# Patient Record
Sex: MALE | Race: WHITE | NOT HISPANIC OR LATINO | Employment: OTHER | URBAN - METROPOLITAN AREA
[De-identification: names, ages, dates, MRNs, and addresses within clinical notes are randomized per-mention and may not be internally consistent; named-entity substitution may affect disease eponyms.]

---

## 2018-01-11 ENCOUNTER — HOSPITAL ENCOUNTER (OUTPATIENT)
Dept: GENERAL RADIOLOGY | Facility: HOSPITAL | Age: 58
Discharge: HOME | End: 2018-01-11
Admitting: ORTHOPAEDIC SURGERY

## 2018-01-11 ENCOUNTER — APPOINTMENT (OUTPATIENT)
Dept: PREADMISSION TESTING | Facility: HOSPITAL | Age: 58
End: 2018-01-11

## 2018-01-11 ENCOUNTER — HOSPITAL ENCOUNTER (OUTPATIENT)
Dept: GENERAL RADIOLOGY | Facility: HOSPITAL | Age: 58
Discharge: HOME | End: 2018-01-11

## 2018-01-11 VITALS
TEMPERATURE: 98.6 F | WEIGHT: 208 LBS | RESPIRATION RATE: 16 BRPM | DIASTOLIC BLOOD PRESSURE: 82 MMHG | HEIGHT: 67 IN | OXYGEN SATURATION: 98 % | BODY MASS INDEX: 32.65 KG/M2 | SYSTOLIC BLOOD PRESSURE: 160 MMHG | HEART RATE: 67 BPM

## 2018-01-11 LAB
ALBUMIN SERPL-MCNC: 4.5 G/DL (ref 3.5–5.2)
ALBUMIN/GLOB SERPL: 1.3 G/DL
ALP SERPL-CCNC: 76 U/L (ref 39–117)
ALT SERPL W P-5'-P-CCNC: 38 U/L (ref 1–41)
ANION GAP SERPL CALCULATED.3IONS-SCNC: 14.6 MMOL/L
APTT PPP: 29.2 SECONDS (ref 22.7–35.4)
AST SERPL-CCNC: 28 U/L (ref 1–40)
BASOPHILS # BLD AUTO: 0.04 10*3/MM3 (ref 0–0.2)
BASOPHILS NFR BLD AUTO: 0.5 % (ref 0–1.5)
BILIRUB SERPL-MCNC: 0.5 MG/DL (ref 0.1–1.2)
BILIRUB UR QL STRIP: NEGATIVE
BUN BLD-MCNC: 21 MG/DL (ref 6–20)
BUN/CREAT SERPL: 20.4 (ref 7–25)
CALCIUM SPEC-SCNC: 9.5 MG/DL (ref 8.6–10.5)
CHLORIDE SERPL-SCNC: 99 MMOL/L (ref 98–107)
CLARITY UR: CLEAR
CO2 SERPL-SCNC: 24.4 MMOL/L (ref 22–29)
COLOR UR: YELLOW
CREAT BLD-MCNC: 1.03 MG/DL (ref 0.76–1.27)
DEPRECATED RDW RBC AUTO: 42.1 FL (ref 37–54)
EOSINOPHIL # BLD AUTO: 0.16 10*3/MM3 (ref 0–0.7)
EOSINOPHIL NFR BLD AUTO: 1.9 % (ref 0.3–6.2)
ERYTHROCYTE [DISTWIDTH] IN BLOOD BY AUTOMATED COUNT: 12.4 % (ref 11.5–14.5)
GFR SERPL CREATININE-BSD FRML MDRD: 74 ML/MIN/1.73
GLOBULIN UR ELPH-MCNC: 3.4 GM/DL
GLUCOSE BLD-MCNC: 95 MG/DL (ref 65–99)
GLUCOSE UR STRIP-MCNC: NEGATIVE MG/DL
HCT VFR BLD AUTO: 47.2 % (ref 40.4–52.2)
HGB BLD-MCNC: 16.2 G/DL (ref 13.7–17.6)
HGB UR QL STRIP.AUTO: NEGATIVE
IMM GRANULOCYTES # BLD: 0.03 10*3/MM3 (ref 0–0.03)
IMM GRANULOCYTES NFR BLD: 0.4 % (ref 0–0.5)
INR PPP: 1.04 (ref 0.9–1.1)
KETONES UR QL STRIP: NEGATIVE
LEUKOCYTE ESTERASE UR QL STRIP.AUTO: NEGATIVE
LYMPHOCYTES # BLD AUTO: 2.47 10*3/MM3 (ref 0.9–4.8)
LYMPHOCYTES NFR BLD AUTO: 29.4 % (ref 19.6–45.3)
MCH RBC QN AUTO: 32.2 PG (ref 27–32.7)
MCHC RBC AUTO-ENTMCNC: 34.3 G/DL (ref 32.6–36.4)
MCV RBC AUTO: 93.8 FL (ref 79.8–96.2)
MONOCYTES # BLD AUTO: 0.64 10*3/MM3 (ref 0.2–1.2)
MONOCYTES NFR BLD AUTO: 7.6 % (ref 5–12)
NEUTROPHILS # BLD AUTO: 5.05 10*3/MM3 (ref 1.9–8.1)
NEUTROPHILS NFR BLD AUTO: 60.2 % (ref 42.7–76)
NITRITE UR QL STRIP: NEGATIVE
PH UR STRIP.AUTO: 6 [PH] (ref 5–8)
PLATELET # BLD AUTO: 205 10*3/MM3 (ref 140–500)
PMV BLD AUTO: 9.3 FL (ref 6–12)
POTASSIUM BLD-SCNC: 4 MMOL/L (ref 3.5–5.2)
PROT SERPL-MCNC: 7.9 G/DL (ref 6–8.5)
PROT UR QL STRIP: NEGATIVE
PROTHROMBIN TIME: 13.2 SECONDS (ref 11.7–14.2)
RBC # BLD AUTO: 5.03 10*6/MM3 (ref 4.6–6)
SODIUM BLD-SCNC: 138 MMOL/L (ref 136–145)
SP GR UR STRIP: 1.02 (ref 1–1.03)
UROBILINOGEN UR QL STRIP: NORMAL
WBC NRBC COR # BLD: 8.39 10*3/MM3 (ref 4.5–10.7)

## 2018-01-11 PROCEDURE — 73560 X-RAY EXAM OF KNEE 1 OR 2: CPT

## 2018-01-11 PROCEDURE — 80053 COMPREHEN METABOLIC PANEL: CPT | Performed by: ORTHOPAEDIC SURGERY

## 2018-01-11 PROCEDURE — 85610 PROTHROMBIN TIME: CPT | Performed by: ORTHOPAEDIC SURGERY

## 2018-01-11 PROCEDURE — 85730 THROMBOPLASTIN TIME PARTIAL: CPT | Performed by: ORTHOPAEDIC SURGERY

## 2018-01-11 PROCEDURE — 71046 X-RAY EXAM CHEST 2 VIEWS: CPT

## 2018-01-11 PROCEDURE — 93010 ELECTROCARDIOGRAM REPORT: CPT | Performed by: INTERNAL MEDICINE

## 2018-01-11 PROCEDURE — 85025 COMPLETE CBC W/AUTO DIFF WBC: CPT | Performed by: ORTHOPAEDIC SURGERY

## 2018-01-11 PROCEDURE — 36415 COLL VENOUS BLD VENIPUNCTURE: CPT

## 2018-01-11 PROCEDURE — 81003 URINALYSIS AUTO W/O SCOPE: CPT | Performed by: ORTHOPAEDIC SURGERY

## 2018-01-11 PROCEDURE — 93005 ELECTROCARDIOGRAM TRACING: CPT

## 2018-01-11 RX ORDER — VALSARTAN 40 MG/1
40 TABLET ORAL EVERY MORNING
Status: ON HOLD | COMMUNITY
End: 2021-12-23

## 2018-01-11 RX ORDER — ASPIRIN 81 MG/1
81 TABLET, CHEWABLE ORAL DAILY
COMMUNITY
End: 2018-01-19 | Stop reason: HOSPADM

## 2018-01-11 RX ORDER — LEVOCETIRIZINE DIHYDROCHLORIDE 5 MG/1
5 TABLET, FILM COATED ORAL EVERY EVENING
Status: ON HOLD | COMMUNITY
End: 2021-12-23

## 2018-01-11 ASSESSMENT — KOOS JR
KOOS JR SCORE: 47.487
KOOS JR SCORE: 16

## 2018-01-11 NOTE — DISCHARGE INSTRUCTIONS
Take the following medications the morning of surgery with a small sip of water:    VALSARTAN  METOPROLOL/HCTZ  NEXIUM    General Instructions:  • Do not eat solid food after midnight the night before surgery.  • You may drink clear liquids day of surgery but must stop at least one hour before your hospital arrival time @ 0430 AM STOP DRINKING!!!  • It is beneficial for you to have a clear drink that contains carbohydrates the day of surgery.  We suggest a 12 to 20 ounce bottle of Gatorade or Powerade for non-diabetic patients or a 12 to 20 ounce bottle of G2 or Powerade Zero for diabetic patients.     Clear liquids are liquids you can see through.  Nothing red in color.     Plain water                               Sports drinks  Sodas                                   Gelatin (Jell-O)  Fruit juices without pulp such as white grape juice and apple juice  Popsicles that contain no fruit or yogurt  Tea or coffee (no cream or milk added)  Gatorade / Powerade  G2 / Powerade Zero    • Patients who avoid smoking, chewing tobacco and alcohol for 4 weeks prior to surgery have a reduced risk of post-operative complications.  Quit smoking as many days before surgery as you can.  • Do not smoke, use chewing tobacco or drink alcohol the day of surgery.   • If applicable bring your C-PAP/ BI-PAP machine.  • Bring any papers given to you in the doctor’s office.  • Wear clean comfortable clothes and socks.  • Do not wear contact lenses or make-up.  Bring a case for your glasses.   • Bring crutches or walker if applicable.  • Remove all piercings.  Leave jewelry and any other valuables at home.  • Hair extensions with metal clips must be removed prior to surgery.  • The Pre-Admission Testing nurse will instruct you to bring medications if unable to obtain an accurate list in Pre-Admission Testing.        Preventing a Surgical Site Infection:  • For 2 to 3 days before surgery, avoid shaving with a razor because the razor can  irritate skin and make it easier to develop an infection.  • The night prior to surgery sleep in a clean bed with clean clothing.  Do not allow pets to sleep with you.  • Shower on the morning of surgery using a fresh bar of anti-bacterial soap (such as Dial) and clean washcloth.  Dry with a clean towel and dress in clean clothing.  • Ask your surgeon if you will be receiving antibiotics prior to surgery.  • Make sure you, your family, and all healthcare providers clean their hands with soap and water or an alcohol based hand  before caring for you or your wound.    Day of surgery: 01- ARRIVE @ 0530 AM REPORT TO THE MAIN OR   Upon arrival, a Pre-op nurse and Anesthesiologist will review your health history, obtain vital signs, and answer questions you may have.  The only belongings needed at this time will be your home medications and if applicable your C-PAP/BI-PAP machine.  If you are staying overnight your family can leave the rest of your belongings in the car and bring them to your room later.  A Pre-op nurse will start an IV and you may receive medication in preparation for surgery, including something to help you relax.  Your family will be able to see you in the Pre-op area.  While you are in surgery your family should notify the waiting room  if they leave the waiting room area and provide a contact phone number.    Please be aware that surgery does come with discomfort.  We want to make every effort to control your discomfort so please discuss any uncontrolled symptoms with your nurse.   Your doctor will most likely have prescribed pain medications.      If you are going home after surgery you will receive individualized written care instructions before being discharged.  A responsible adult must drive you to and from the hospital on the day of your surgery and stay with you for 24 hours.    If you are staying overnight following surgery, you will be transported to your hospital  room following the recovery period.  [facility] has all private rooms.    If you have any questions please call Pre-Admission Testing at [phone].  Deductibles and co-payments are collected on the day of service. Please be prepared to pay the required co-pay, deductible or deposit on the day of service as defined by your plan.    2% CHLORAHEXIDINE GLUCONATE* CLOTH  Preparing or “prepping” skin before surgery can reduce the risk of infection at the surgical site. To make the process easier, [facility] has chosen disposable cloths moistened with a rinse-free, 2% Chlorhexidine Gluconate (CHG) antiseptic solution. The steps below outline the prepping process and should be carefully followed.        Use the prep cloth on the area that is circled in the diagram             Directions Night before Surgery 01- PM PRIOR TO SURGERY (LEFT LEG)  1) Shower using a fresh bar of anti-bacterial soap (such as Dial) and clean washcloth.  Use a clean towel to completely dry your skin.  2) Do not use any lotions, oils or creams on your skin.  3) Open the package and remove 1 cloth, wipe your skin for 30 seconds in a circular motion.  Allow to dry for 3 minutes.  4) Repeat #3 with second cloth.  5) Do not touch your eyes, ears, or mouth with the prep cloth.  6) Allow the wet prep solution to air dry.  7) Discard the prep cloth and wash your hands with soap and water.   8) Dress in clean bed clothes and sleep on fresh clean bed sheets.   9) You may experience some temporary itching after the prep.    Directions Day of Surgery 01- AM PRIOR TO SURGERY (LEFT LEG)  1) Repeat steps 1,2,3,4,5,6,7, and 9.   2) Dress in clean clothes before coming to the hospital.    BACTROBAN NASAL OINTMENT  There are many germs normally in your nose. Bactroban is an ointment that will help reduce these germs. Please follow these instructions for Bactroban use:    ____The day before surgery in the  morning  Jzec__[phone]______    ____The day before surgery in the evening              Kjva__[phone]______    ____The day of surgery in the morning    Anpk__[phone]______    **Squirt ½ package of Bactroban Ointment onto a cotton applicator and apply to inside of 1st nostril.  Squirt the remaining Bactroban and apply to the inside of the other nostril.

## 2018-01-18 ENCOUNTER — HOSPITAL ENCOUNTER (INPATIENT)
Facility: HOSPITAL | Age: 58
LOS: 1 days | Discharge: HOME HEALTH | End: 2018-01-19
Attending: ORTHOPAEDIC SURGERY | Admitting: ORTHOPAEDIC SURGERY

## 2018-01-18 ENCOUNTER — APPOINTMENT (OUTPATIENT)
Dept: GENERAL RADIOLOGY | Facility: HOSPITAL | Age: 58
End: 2018-01-18

## 2018-01-18 ENCOUNTER — ANESTHESIA EVENT (OUTPATIENT)
Dept: PERIOP | Facility: HOSPITAL | Age: 58
End: 2018-01-18

## 2018-01-18 ENCOUNTER — ANESTHESIA (OUTPATIENT)
Dept: PERIOP | Facility: HOSPITAL | Age: 58
End: 2018-01-18

## 2018-01-18 DIAGNOSIS — Z96.652 STATUS POST TOTAL LEFT KNEE REPLACEMENT: Primary | ICD-10-CM

## 2018-01-18 PROBLEM — M17.9 DJD (DEGENERATIVE JOINT DISEASE) OF KNEE: Status: ACTIVE | Noted: 2018-01-18

## 2018-01-18 PROCEDURE — 73560 X-RAY EXAM OF KNEE 1 OR 2: CPT

## 2018-01-18 PROCEDURE — 25010000003 CEFAZOLIN IN DEXTROSE 2-4 GM/100ML-% SOLUTION: Performed by: NURSE ANESTHETIST, CERTIFIED REGISTERED

## 2018-01-18 PROCEDURE — C1713 ANCHOR/SCREW BN/BN,TIS/BN: HCPCS | Performed by: ORTHOPAEDIC SURGERY

## 2018-01-18 PROCEDURE — 25010000002 KETOROLAC TROMETHAMINE PER 15 MG: Performed by: ORTHOPAEDIC SURGERY

## 2018-01-18 PROCEDURE — 97110 THERAPEUTIC EXERCISES: CPT

## 2018-01-18 PROCEDURE — 0SRD0J9 REPLACEMENT OF LEFT KNEE JOINT WITH SYNTHETIC SUBSTITUTE, CEMENTED, OPEN APPROACH: ICD-10-PCS | Performed by: ORTHOPAEDIC SURGERY

## 2018-01-18 PROCEDURE — 25010000002 DEXAMETHASONE PER 1 MG: Performed by: NURSE ANESTHETIST, CERTIFIED REGISTERED

## 2018-01-18 PROCEDURE — 25010000002 MIDAZOLAM PER 1 MG: Performed by: ANESTHESIOLOGY

## 2018-01-18 PROCEDURE — C1776 JOINT DEVICE (IMPLANTABLE): HCPCS | Performed by: ORTHOPAEDIC SURGERY

## 2018-01-18 PROCEDURE — 25010000002 NEOSTIGMINE PER 0.5 MG: Performed by: NURSE ANESTHETIST, CERTIFIED REGISTERED

## 2018-01-18 PROCEDURE — 25010000002 ONDANSETRON PER 1 MG: Performed by: NURSE ANESTHETIST, CERTIFIED REGISTERED

## 2018-01-18 PROCEDURE — 25010000002 FENTANYL CITRATE (PF) 100 MCG/2ML SOLUTION: Performed by: ANESTHESIOLOGY

## 2018-01-18 PROCEDURE — 97162 PT EVAL MOD COMPLEX 30 MIN: CPT

## 2018-01-18 PROCEDURE — 25010000002 SUCCINYLCHOLINE PER 20 MG: Performed by: NURSE ANESTHETIST, CERTIFIED REGISTERED

## 2018-01-18 PROCEDURE — 25010000003 CEFAZOLIN IN DEXTROSE 2-4 GM/100ML-% SOLUTION: Performed by: ORTHOPAEDIC SURGERY

## 2018-01-18 PROCEDURE — 25010000002 HYDROMORPHONE PER 4 MG: Performed by: NURSE ANESTHETIST, CERTIFIED REGISTERED

## 2018-01-18 PROCEDURE — 25010000002 PROPOFOL 10 MG/ML EMULSION: Performed by: NURSE ANESTHETIST, CERTIFIED REGISTERED

## 2018-01-18 PROCEDURE — 25010000002 VANCOMYCIN 10 G RECONSTITUTED SOLUTION: Performed by: ORTHOPAEDIC SURGERY

## 2018-01-18 PROCEDURE — 25010000002 FENTANYL CITRATE (PF) 100 MCG/2ML SOLUTION: Performed by: NURSE ANESTHETIST, CERTIFIED REGISTERED

## 2018-01-18 PROCEDURE — 25010000002 FENTANYL CITRATE (PF) 250 MCG/5ML SOLUTION

## 2018-01-18 DEVICE — CMT BONE PALACOS 120001: Type: IMPLANTABLE DEVICE | Site: KNEE | Status: FUNCTIONAL

## 2018-01-18 DEVICE — JOURNEY II CR FEMORAL OXINIUM NONPOROUS LEFT SIZE 5
Type: IMPLANTABLE DEVICE | Site: KNEE | Status: FUNCTIONAL
Brand: JOURNEY

## 2018-01-18 DEVICE — IMPLANTABLE DEVICE: Type: IMPLANTABLE DEVICE | Site: KNEE | Status: FUNCTIONAL

## 2018-01-18 DEVICE — JOURNEY TIBIAL BASEPLATE NONPOROUS                                    LEFT SIZE 5
Type: IMPLANTABLE DEVICE | Site: KNEE | Status: FUNCTIONAL
Brand: JOURNEY

## 2018-01-18 DEVICE — JOURNEY 7.5 ROUND RESURF PAT 35MM STANDARD
Type: IMPLANTABLE DEVICE | Site: KNEE | Status: FUNCTIONAL
Brand: JOURNEY

## 2018-01-18 RX ORDER — ACETAMINOPHEN 325 MG/1
325 TABLET ORAL EVERY 4 HOURS PRN
Status: DISCONTINUED | OUTPATIENT
Start: 2018-01-18 | End: 2018-01-19 | Stop reason: HOSPADM

## 2018-01-18 RX ORDER — MIDAZOLAM HYDROCHLORIDE 1 MG/ML
2 INJECTION INTRAMUSCULAR; INTRAVENOUS
Status: DISCONTINUED | OUTPATIENT
Start: 2018-01-18 | End: 2018-01-18 | Stop reason: HOSPADM

## 2018-01-18 RX ORDER — SODIUM CHLORIDE, SODIUM LACTATE, POTASSIUM CHLORIDE, CALCIUM CHLORIDE 600; 310; 30; 20 MG/100ML; MG/100ML; MG/100ML; MG/100ML
9 INJECTION, SOLUTION INTRAVENOUS CONTINUOUS
Status: DISCONTINUED | OUTPATIENT
Start: 2018-01-18 | End: 2018-01-19 | Stop reason: HOSPADM

## 2018-01-18 RX ORDER — FLUMAZENIL 0.1 MG/ML
0.2 INJECTION INTRAVENOUS AS NEEDED
Status: DISCONTINUED | OUTPATIENT
Start: 2018-01-18 | End: 2018-01-18 | Stop reason: HOSPADM

## 2018-01-18 RX ORDER — HYDRALAZINE HYDROCHLORIDE 20 MG/ML
5 INJECTION INTRAMUSCULAR; INTRAVENOUS
Status: DISCONTINUED | OUTPATIENT
Start: 2018-01-18 | End: 2018-01-18 | Stop reason: HOSPADM

## 2018-01-18 RX ORDER — SODIUM CHLORIDE, SODIUM LACTATE, POTASSIUM CHLORIDE, CALCIUM CHLORIDE 600; 310; 30; 20 MG/100ML; MG/100ML; MG/100ML; MG/100ML
100 INJECTION, SOLUTION INTRAVENOUS CONTINUOUS
Status: DISCONTINUED | OUTPATIENT
Start: 2018-01-18 | End: 2018-01-19 | Stop reason: HOSPADM

## 2018-01-18 RX ORDER — KETOROLAC TROMETHAMINE 30 MG/ML
30 INJECTION, SOLUTION INTRAMUSCULAR; INTRAVENOUS EVERY 6 HOURS
Status: COMPLETED | OUTPATIENT
Start: 2018-01-18 | End: 2018-01-19

## 2018-01-18 RX ORDER — NALOXONE HCL 0.4 MG/ML
0.2 VIAL (ML) INJECTION AS NEEDED
Status: DISCONTINUED | OUTPATIENT
Start: 2018-01-18 | End: 2018-01-18 | Stop reason: HOSPADM

## 2018-01-18 RX ORDER — FAMOTIDINE 10 MG/ML
20 INJECTION, SOLUTION INTRAVENOUS ONCE
Status: COMPLETED | OUTPATIENT
Start: 2018-01-18 | End: 2018-01-18

## 2018-01-18 RX ORDER — DIPHENHYDRAMINE HYDROCHLORIDE 50 MG/ML
12.5 INJECTION INTRAMUSCULAR; INTRAVENOUS
Status: DISCONTINUED | OUTPATIENT
Start: 2018-01-18 | End: 2018-01-18 | Stop reason: HOSPADM

## 2018-01-18 RX ORDER — OXYCODONE AND ACETAMINOPHEN 7.5; 325 MG/1; MG/1
1 TABLET ORAL ONCE AS NEEDED
Status: DISCONTINUED | OUTPATIENT
Start: 2018-01-18 | End: 2018-01-18 | Stop reason: HOSPADM

## 2018-01-18 RX ORDER — ACETAMINOPHEN 500 MG
1000 TABLET ORAL ONCE
Status: COMPLETED | OUTPATIENT
Start: 2018-01-18 | End: 2018-01-18

## 2018-01-18 RX ORDER — GLYCOPYRROLATE 0.2 MG/ML
INJECTION INTRAMUSCULAR; INTRAVENOUS AS NEEDED
Status: DISCONTINUED | OUTPATIENT
Start: 2018-01-18 | End: 2018-01-18 | Stop reason: SURG

## 2018-01-18 RX ORDER — HYDROMORPHONE HCL 110MG/55ML
1 PATIENT CONTROLLED ANALGESIA SYRINGE INTRAVENOUS EVERY 4 HOURS PRN
Status: DISCONTINUED | OUTPATIENT
Start: 2018-01-18 | End: 2018-01-19 | Stop reason: HOSPADM

## 2018-01-18 RX ORDER — ASPIRIN 325 MG
325 TABLET, DELAYED RELEASE (ENTERIC COATED) ORAL DAILY
Status: DISCONTINUED | OUTPATIENT
Start: 2018-01-18 | End: 2018-01-19 | Stop reason: HOSPADM

## 2018-01-18 RX ORDER — CEFAZOLIN SODIUM 2 G/100ML
2 INJECTION, SOLUTION INTRAVENOUS EVERY 8 HOURS
Status: COMPLETED | OUTPATIENT
Start: 2018-01-18 | End: 2018-01-19

## 2018-01-18 RX ORDER — LIDOCAINE HYDROCHLORIDE 20 MG/ML
INJECTION, SOLUTION INFILTRATION; PERINEURAL AS NEEDED
Status: DISCONTINUED | OUTPATIENT
Start: 2018-01-18 | End: 2018-01-18 | Stop reason: SURG

## 2018-01-18 RX ORDER — CEFAZOLIN SODIUM 2 G/100ML
INJECTION, SOLUTION INTRAVENOUS AS NEEDED
Status: DISCONTINUED | OUTPATIENT
Start: 2018-01-18 | End: 2018-01-18 | Stop reason: SURG

## 2018-01-18 RX ORDER — FENTANYL CITRATE 50 UG/ML
INJECTION, SOLUTION INTRAMUSCULAR; INTRAVENOUS
Status: COMPLETED
Start: 2018-01-18 | End: 2018-01-18

## 2018-01-18 RX ORDER — ONDANSETRON 2 MG/ML
4 INJECTION INTRAMUSCULAR; INTRAVENOUS ONCE AS NEEDED
Status: DISCONTINUED | OUTPATIENT
Start: 2018-01-18 | End: 2018-01-18 | Stop reason: HOSPADM

## 2018-01-18 RX ORDER — ONDANSETRON 2 MG/ML
INJECTION INTRAMUSCULAR; INTRAVENOUS AS NEEDED
Status: DISCONTINUED | OUTPATIENT
Start: 2018-01-18 | End: 2018-01-18 | Stop reason: SURG

## 2018-01-18 RX ORDER — ACETAMINOPHEN 650 MG/1
650 SUPPOSITORY RECTAL EVERY 4 HOURS PRN
Status: DISCONTINUED | OUTPATIENT
Start: 2018-01-18 | End: 2018-01-19 | Stop reason: HOSPADM

## 2018-01-18 RX ORDER — OXYCODONE HYDROCHLORIDE AND ACETAMINOPHEN 5; 325 MG/1; MG/1
2 TABLET ORAL EVERY 4 HOURS PRN
Status: DISCONTINUED | OUTPATIENT
Start: 2018-01-18 | End: 2018-01-19 | Stop reason: HOSPADM

## 2018-01-18 RX ORDER — MIDAZOLAM HYDROCHLORIDE 1 MG/ML
1 INJECTION INTRAMUSCULAR; INTRAVENOUS
Status: DISCONTINUED | OUTPATIENT
Start: 2018-01-18 | End: 2018-01-18 | Stop reason: HOSPADM

## 2018-01-18 RX ORDER — HYDROMORPHONE HCL 110MG/55ML
PATIENT CONTROLLED ANALGESIA SYRINGE INTRAVENOUS AS NEEDED
Status: DISCONTINUED | OUTPATIENT
Start: 2018-01-18 | End: 2018-01-18 | Stop reason: SURG

## 2018-01-18 RX ORDER — PROPOFOL 10 MG/ML
VIAL (ML) INTRAVENOUS AS NEEDED
Status: DISCONTINUED | OUTPATIENT
Start: 2018-01-18 | End: 2018-01-18 | Stop reason: SURG

## 2018-01-18 RX ORDER — LISINOPRIL 20 MG/1
20 TABLET ORAL
Status: DISCONTINUED | OUTPATIENT
Start: 2018-01-18 | End: 2018-01-19 | Stop reason: HOSPADM

## 2018-01-18 RX ORDER — NALOXONE HCL 0.4 MG/ML
0.1 VIAL (ML) INJECTION
Status: DISCONTINUED | OUTPATIENT
Start: 2018-01-18 | End: 2018-01-19 | Stop reason: HOSPADM

## 2018-01-18 RX ORDER — BISACODYL 10 MG
10 SUPPOSITORY, RECTAL RECTAL DAILY PRN
Status: DISCONTINUED | OUTPATIENT
Start: 2018-01-18 | End: 2018-01-19 | Stop reason: HOSPADM

## 2018-01-18 RX ORDER — PROMETHAZINE HYDROCHLORIDE 25 MG/1
12.5 TABLET ORAL ONCE AS NEEDED
Status: DISCONTINUED | OUTPATIENT
Start: 2018-01-18 | End: 2018-01-18 | Stop reason: HOSPADM

## 2018-01-18 RX ORDER — SODIUM CHLORIDE 0.9 % (FLUSH) 0.9 %
1-10 SYRINGE (ML) INJECTION AS NEEDED
Status: DISCONTINUED | OUTPATIENT
Start: 2018-01-18 | End: 2018-01-19 | Stop reason: HOSPADM

## 2018-01-18 RX ORDER — FERROUS SULFATE 325(65) MG
325 TABLET ORAL
Status: DISCONTINUED | OUTPATIENT
Start: 2018-01-19 | End: 2018-01-19 | Stop reason: HOSPADM

## 2018-01-18 RX ORDER — SUCCINYLCHOLINE CHLORIDE 20 MG/ML
INJECTION INTRAMUSCULAR; INTRAVENOUS AS NEEDED
Status: DISCONTINUED | OUTPATIENT
Start: 2018-01-18 | End: 2018-01-18 | Stop reason: SURG

## 2018-01-18 RX ORDER — FENTANYL CITRATE 50 UG/ML
50 INJECTION, SOLUTION INTRAMUSCULAR; INTRAVENOUS
Status: DISCONTINUED | OUTPATIENT
Start: 2018-01-18 | End: 2018-01-18 | Stop reason: HOSPADM

## 2018-01-18 RX ORDER — AMLODIPINE BESYLATE AND BENAZEPRIL HYDROCHLORIDE 10; 20 MG/1; MG/1
1 CAPSULE ORAL
Status: DISCONTINUED | OUTPATIENT
Start: 2018-01-18 | End: 2018-01-18 | Stop reason: CLARIF

## 2018-01-18 RX ORDER — ONDANSETRON 4 MG/1
4 TABLET, FILM COATED ORAL EVERY 6 HOURS PRN
Status: DISCONTINUED | OUTPATIENT
Start: 2018-01-18 | End: 2018-01-19 | Stop reason: HOSPADM

## 2018-01-18 RX ORDER — TRANEXAMIC ACID 100 MG/ML
INJECTION, SOLUTION INTRAVENOUS AS NEEDED
Status: DISCONTINUED | OUTPATIENT
Start: 2018-01-18 | End: 2018-01-18 | Stop reason: SURG

## 2018-01-18 RX ORDER — MAGNESIUM HYDROXIDE 1200 MG/15ML
LIQUID ORAL AS NEEDED
Status: DISCONTINUED | OUTPATIENT
Start: 2018-01-18 | End: 2018-01-18 | Stop reason: HOSPADM

## 2018-01-18 RX ORDER — EPHEDRINE SULFATE 50 MG/ML
5 INJECTION, SOLUTION INTRAVENOUS ONCE AS NEEDED
Status: DISCONTINUED | OUTPATIENT
Start: 2018-01-18 | End: 2018-01-18 | Stop reason: HOSPADM

## 2018-01-18 RX ORDER — PROMETHAZINE HYDROCHLORIDE 25 MG/1
25 SUPPOSITORY RECTAL ONCE AS NEEDED
Status: DISCONTINUED | OUTPATIENT
Start: 2018-01-18 | End: 2018-01-18 | Stop reason: HOSPADM

## 2018-01-18 RX ORDER — OXYCODONE HYDROCHLORIDE AND ACETAMINOPHEN 5; 325 MG/1; MG/1
1 TABLET ORAL EVERY 4 HOURS PRN
Status: DISCONTINUED | OUTPATIENT
Start: 2018-01-18 | End: 2018-01-19 | Stop reason: HOSPADM

## 2018-01-18 RX ORDER — DEXAMETHASONE SODIUM PHOSPHATE 10 MG/ML
INJECTION INTRAMUSCULAR; INTRAVENOUS AS NEEDED
Status: DISCONTINUED | OUTPATIENT
Start: 2018-01-18 | End: 2018-01-18 | Stop reason: SURG

## 2018-01-18 RX ORDER — LABETALOL HYDROCHLORIDE 5 MG/ML
5 INJECTION, SOLUTION INTRAVENOUS
Status: DISCONTINUED | OUTPATIENT
Start: 2018-01-18 | End: 2018-01-18 | Stop reason: HOSPADM

## 2018-01-18 RX ORDER — PROMETHAZINE HYDROCHLORIDE 25 MG/ML
12.5 INJECTION, SOLUTION INTRAMUSCULAR; INTRAVENOUS ONCE AS NEEDED
Status: DISCONTINUED | OUTPATIENT
Start: 2018-01-18 | End: 2018-01-18 | Stop reason: HOSPADM

## 2018-01-18 RX ORDER — LIDOCAINE HYDROCHLORIDE 10 MG/ML
0.5 INJECTION, SOLUTION EPIDURAL; INFILTRATION; INTRACAUDAL; PERINEURAL ONCE AS NEEDED
Status: DISCONTINUED | OUTPATIENT
Start: 2018-01-18 | End: 2018-01-18 | Stop reason: HOSPADM

## 2018-01-18 RX ORDER — AMLODIPINE BESYLATE 10 MG/1
10 TABLET ORAL
Status: DISCONTINUED | OUTPATIENT
Start: 2018-01-18 | End: 2018-01-19 | Stop reason: HOSPADM

## 2018-01-18 RX ORDER — ONDANSETRON 2 MG/ML
4 INJECTION INTRAMUSCULAR; INTRAVENOUS EVERY 6 HOURS PRN
Status: DISCONTINUED | OUTPATIENT
Start: 2018-01-18 | End: 2018-01-19 | Stop reason: HOSPADM

## 2018-01-18 RX ORDER — ACETAMINOPHEN 160 MG/5ML
650 SOLUTION ORAL EVERY 4 HOURS PRN
Status: DISCONTINUED | OUTPATIENT
Start: 2018-01-18 | End: 2018-01-19 | Stop reason: HOSPADM

## 2018-01-18 RX ORDER — BISACODYL 5 MG/1
10 TABLET, DELAYED RELEASE ORAL DAILY PRN
Status: DISCONTINUED | OUTPATIENT
Start: 2018-01-18 | End: 2018-01-19 | Stop reason: HOSPADM

## 2018-01-18 RX ORDER — HYDROMORPHONE HCL 110MG/55ML
0.5 PATIENT CONTROLLED ANALGESIA SYRINGE INTRAVENOUS
Status: DISCONTINUED | OUTPATIENT
Start: 2018-01-18 | End: 2018-01-18 | Stop reason: HOSPADM

## 2018-01-18 RX ORDER — EPHEDRINE SULFATE 50 MG/ML
INJECTION, SOLUTION INTRAVENOUS AS NEEDED
Status: DISCONTINUED | OUTPATIENT
Start: 2018-01-18 | End: 2018-01-18 | Stop reason: SURG

## 2018-01-18 RX ORDER — SODIUM CHLORIDE 0.9 % (FLUSH) 0.9 %
1-10 SYRINGE (ML) INJECTION AS NEEDED
Status: DISCONTINUED | OUTPATIENT
Start: 2018-01-18 | End: 2018-01-18 | Stop reason: HOSPADM

## 2018-01-18 RX ORDER — HYDROCODONE BITARTRATE AND ACETAMINOPHEN 7.5; 325 MG/1; MG/1
1 TABLET ORAL ONCE AS NEEDED
Status: DISCONTINUED | OUTPATIENT
Start: 2018-01-18 | End: 2018-01-18 | Stop reason: HOSPADM

## 2018-01-18 RX ORDER — ROCURONIUM BROMIDE 10 MG/ML
INJECTION, SOLUTION INTRAVENOUS AS NEEDED
Status: DISCONTINUED | OUTPATIENT
Start: 2018-01-18 | End: 2018-01-18 | Stop reason: SURG

## 2018-01-18 RX ORDER — PROMETHAZINE HYDROCHLORIDE 25 MG/1
25 TABLET ORAL ONCE AS NEEDED
Status: DISCONTINUED | OUTPATIENT
Start: 2018-01-18 | End: 2018-01-18 | Stop reason: HOSPADM

## 2018-01-18 RX ORDER — ONDANSETRON 4 MG/1
4 TABLET, ORALLY DISINTEGRATING ORAL EVERY 6 HOURS PRN
Status: DISCONTINUED | OUTPATIENT
Start: 2018-01-18 | End: 2018-01-19 | Stop reason: HOSPADM

## 2018-01-18 RX ORDER — ACETAMINOPHEN 325 MG/1
650 TABLET ORAL EVERY 4 HOURS PRN
Status: DISCONTINUED | OUTPATIENT
Start: 2018-01-18 | End: 2018-01-19 | Stop reason: HOSPADM

## 2018-01-18 RX ADMIN — FENTANYL CITRATE 50 MCG: 50 INJECTION, SOLUTION INTRAMUSCULAR; INTRAVENOUS at 13:15

## 2018-01-18 RX ADMIN — SODIUM CHLORIDE, POTASSIUM CHLORIDE, SODIUM LACTATE AND CALCIUM CHLORIDE: 600; 310; 30; 20 INJECTION, SOLUTION INTRAVENOUS at 11:22

## 2018-01-18 RX ADMIN — PROPOFOL 200 MG: 10 INJECTION, EMULSION INTRAVENOUS at 10:29

## 2018-01-18 RX ADMIN — FENTANYL CITRATE 100 MCG: 50 INJECTION INTRAMUSCULAR; INTRAVENOUS at 10:50

## 2018-01-18 RX ADMIN — SUCCINYLCHOLINE CHLORIDE 160 MG: 20 INJECTION, SOLUTION INTRAMUSCULAR; INTRAVENOUS; PARENTERAL at 10:29

## 2018-01-18 RX ADMIN — NEOSTIGMINE METHYLSULFATE 2 MG: 1 INJECTION INTRAMUSCULAR; INTRAVENOUS; SUBCUTANEOUS at 11:49

## 2018-01-18 RX ADMIN — FAMOTIDINE 20 MG: 10 INJECTION, SOLUTION INTRAVENOUS at 07:53

## 2018-01-18 RX ADMIN — VANCOMYCIN HYDROCHLORIDE 1500 MG: 100 INJECTION, POWDER, LYOPHILIZED, FOR SOLUTION INTRAVENOUS at 07:44

## 2018-01-18 RX ADMIN — SODIUM CHLORIDE, POTASSIUM CHLORIDE, SODIUM LACTATE AND CALCIUM CHLORIDE 9 ML/HR: 600; 310; 30; 20 INJECTION, SOLUTION INTRAVENOUS at 07:44

## 2018-01-18 RX ADMIN — CEFAZOLIN SODIUM 2 G: 2 INJECTION, SOLUTION INTRAVENOUS at 22:11

## 2018-01-18 RX ADMIN — LIDOCAINE HYDROCHLORIDE 50 MG: 20 INJECTION, SOLUTION INFILTRATION; PERINEURAL at 10:29

## 2018-01-18 RX ADMIN — ROCURONIUM BROMIDE 30 MG: 10 INJECTION INTRAVENOUS at 10:43

## 2018-01-18 RX ADMIN — ACETAMINOPHEN 650 MG: 325 TABLET ORAL at 23:58

## 2018-01-18 RX ADMIN — ASPIRIN 325 MG: 325 TABLET, COATED ORAL at 16:03

## 2018-01-18 RX ADMIN — CEFAZOLIN SODIUM 2 G: 2 INJECTION, SOLUTION INTRAVENOUS at 11:48

## 2018-01-18 RX ADMIN — FENTANYL CITRATE 100 MCG: 50 INJECTION INTRAMUSCULAR; INTRAVENOUS at 10:29

## 2018-01-18 RX ADMIN — ACETAMINOPHEN 1000 MG: 500 TABLET ORAL at 07:35

## 2018-01-18 RX ADMIN — PROPOFOL 50 MG: 10 INJECTION, EMULSION INTRAVENOUS at 10:43

## 2018-01-18 RX ADMIN — ONDANSETRON HYDROCHLORIDE 4 MG: 4 TABLET, FILM COATED ORAL at 17:13

## 2018-01-18 RX ADMIN — KETOROLAC TROMETHAMINE 30 MG: 30 INJECTION, SOLUTION INTRAMUSCULAR at 22:33

## 2018-01-18 RX ADMIN — FENTANYL CITRATE 50 MCG: 50 INJECTION INTRAMUSCULAR; INTRAVENOUS at 10:43

## 2018-01-18 RX ADMIN — HYDROMORPHONE HYDROCHLORIDE 0.5 MG: 2 INJECTION INTRAMUSCULAR; INTRAVENOUS; SUBCUTANEOUS at 10:51

## 2018-01-18 RX ADMIN — ROCURONIUM BROMIDE 5 MG: 10 INJECTION INTRAVENOUS at 10:29

## 2018-01-18 RX ADMIN — GLYCOPYRROLATE 0.2 MG: 0.2 INJECTION INTRAMUSCULAR; INTRAVENOUS at 10:26

## 2018-01-18 RX ADMIN — EPHEDRINE SULFATE 10 MG: 50 INJECTION INTRAMUSCULAR; INTRAVENOUS; SUBCUTANEOUS at 11:10

## 2018-01-18 RX ADMIN — DEXAMETHASONE SODIUM PHOSPHATE 8 MG: 10 INJECTION INTRAMUSCULAR; INTRAVENOUS at 10:39

## 2018-01-18 RX ADMIN — OXYCODONE HYDROCHLORIDE AND ACETAMINOPHEN 2 TABLET: 5; 325 TABLET ORAL at 16:03

## 2018-01-18 RX ADMIN — HYDROMORPHONE HYDROCHLORIDE 0.5 MG: 2 INJECTION, SOLUTION INTRAMUSCULAR; INTRAVENOUS; SUBCUTANEOUS at 12:54

## 2018-01-18 RX ADMIN — TRANEXAMIC ACID 1000 MG: 100 INJECTION, SOLUTION INTRAVENOUS at 11:32

## 2018-01-18 RX ADMIN — FENTANYL CITRATE 50 MCG: 50 INJECTION, SOLUTION INTRAMUSCULAR; INTRAVENOUS at 12:57

## 2018-01-18 RX ADMIN — ONDANSETRON 4 MG: 2 INJECTION INTRAMUSCULAR; INTRAVENOUS at 11:45

## 2018-01-18 RX ADMIN — KETOROLAC TROMETHAMINE 30 MG: 30 INJECTION, SOLUTION INTRAMUSCULAR at 16:03

## 2018-01-18 RX ADMIN — HYDROMORPHONE HYDROCHLORIDE 0.5 MG: 2 INJECTION, SOLUTION INTRAMUSCULAR; INTRAVENOUS; SUBCUTANEOUS at 12:41

## 2018-01-18 RX ADMIN — GLYCOPYRROLATE 0.4 MG: 0.2 INJECTION INTRAMUSCULAR; INTRAVENOUS at 11:49

## 2018-01-18 RX ADMIN — Medication 2 MG: at 07:54

## 2018-01-18 NOTE — PLAN OF CARE
Problem: Patient Care Overview (Adult)  Goal: Plan of Care Review   01/18/18 1602   Coping/Psychosocial Response Interventions   Plan Of Care Reviewed With patient;family   Outcome Evaluation   Outcome Summary/Follow up Plan Patient is a pleasant 57 y.o. male s/p L TKA with expected post op weakness and impaired funct mobility. Patient is independent with all ADLs at baseline and does not use an AD. Assist x 1 required for all mobility. Ambulated 175' with RW. Patient will benefit from skilled PT services acutely to address deficits as able and improve level of independence. Anticipate DC home tomorrow with  PT services for continued rehabilitation.       Problem: Inpatient Physical Therapy  Goal: Bed Mobility Goal LTG- PT   01/18/18 1602   Bed Mobility PT LTG   Bed Mobility PT LTG, Date Established 01/18/18   Bed Mobility PT LTG, Time to Achieve 1 wk   Bed Mobility PT LTG, Activity Type all bed mobility   Bed Mobility PT LTG, [place] Level supervision required     Goal: Transfer Training Goal 1 LTG- PT   01/18/18 1602   Transfer Training PT LTG   Transfer Training PT LTG, Date Established 01/18/18   Transfer Training PT LTG, Time to Achieve 1 wk   Transfer Training PT LTG, Activity Type all transfers   Transfer Training PT LTG, [place] supervision required   Transfer Training PT LTG, Assist Device walker, rolling     Goal: Gait Training Goal LTG- PT   01/18/18 1602   Gait Training PT LTG   Gait Training Goal PT LTG, Date Established 01/18/18   Gait Training Goal PT LTG, Time to Achieve 1 wk   Gait Training Goal PT LTG, [place] supervision required   Gait Training Goal PT LTG, Assist Device walker, rolling   Gait Training Goal PT LTG, Distance to Achieve 150     Goal: Stair Training Goal LTG- PT   01/18/18 1602   Stair Training PT LTG   Stair Training Goal PT LTG, Date Established 01/18/18   Stair Training Goal PT LTG, Time to Achieve 1 wk   Stair Training Goal PT LTG, Number of Steps 2    Stair Training Goal PT LTG, [place] Level contact guard assist   Stair Training Goal PT LTG, Assist Device 1 handrail     Goal: Range of Motion Goal LTG- PT   01/18/18 1602   Range of Motion PT LTG   Range of Motion Goal PT LTG, Date Established 01/18/18   Range of Motion Goal PT LTG, Time to Achieve 1 wk   Range fo Motion Goal PT LTG, Joint L knee   Range of Motion Goal PT LTG, AROM Measure 5-90'

## 2018-01-18 NOTE — ANESTHESIA POSTPROCEDURE EVALUATION
Patient: Christiano Barr    Procedure Summary     Date Anesthesia Start Anesthesia Stop Room / Location    01/18/18 1020 1211  [facility] /  [facility]       Procedure Diagnosis Surgeon Provider    LT TOTAL KNEE ARTHROPLASTY (Left Knee) No diagnosis on file. MD Leonardo Bee MD          Anesthesia Type: general  Last vitals  BP   124/67 (01/18/18 1223)   Temp   37.1 °C (98.7 °F) (01/18/18 1206)   Pulse   57 (01/18/18 1223)   Resp   26 (01/18/18 1223)     SpO2   96 % (01/18/18 1223)     Post Anesthesia Care and Evaluation    Patient location during evaluation: PACU  Patient participation: complete - patient participated  Level of consciousness: awake and alert  Pain management: adequate  Airway patency: patent  Anesthetic complications: No anesthetic complications    Cardiovascular status: acceptable  Respiratory status: acceptable  Hydration status: acceptable    Comments: --------------------            01/18/18               1223     --------------------   BP:       124/67     Pulse:      57       Resp:       26       Temp:                SpO2:      96%      --------------------

## 2018-01-18 NOTE — ANESTHESIA PREPROCEDURE EVALUATION
Anesthesia Evaluation     Patient summary reviewed and Nursing notes reviewed   NPO Solid Status: > 8 hours  NPO Liquid Status: > 2 hours     Airway   Mallampati: III  possible difficult intubation  Dental - normal exam     Pulmonary - negative pulmonary ROS    breath sounds clear to auscultation  Cardiovascular     ECG reviewed  Patient on routine beta blocker and Beta blocker given within 24 hours of surgery  Rhythm: regular  Rate: normal    (+) hypertension,       Neuro/Psych- negative ROS  GI/Hepatic/Renal/Endo    (+) obesity,  GERD,     Musculoskeletal     Abdominal    Substance History - negative use     OB/GYN negative ob/gyn ROS         Other   (+) arthritis                                             Anesthesia Plan    ASA 3     general     intravenous induction

## 2018-01-18 NOTE — OP NOTE
Ralph Panchal M.D. - [facility]    Patient Name:  Christiano Barr  YOB: 1960  Medical Records Number:  [id number]    Date of Procedure:  1/18/2018    Pre-operative Diagnosis:  DJD Left Knee    Post-operative Diagnosis:  DJD Left Knee    Procedure Performed:  Left Total Knee Replacement     Anesthesia: General, supplemented by a periarticular Exparel/bupivacaine injection.      Surgeon: Ralph Panchal MD     Assistant: Desean Arredondo MD; note that as part of the surgical procedure, I utilized service of an assistant surgeon, specifically Desean Arredondo MD. Assistant surgeon participated in crucial portion of the operation. Use of the assistant surgeon greatly reduced overall operative time, thus significantly reducing overall morbidity for the patient.      Implants:      Implant Name Type Inv. Item Serial No.  Lot No. LRB No. Used Action   CMT BONE PALACOS [id number] - [id number] Implant CMT BONE PALACOS [id number]  [employer] [id number] Left 2 Implanted   PATELLA RESRF JOURNEY 7.5X35MM RND - [id number] Implant PATELLA RESRF JOURNEY 7.5X35MM RND  [employer] [id number] Left 1 Implanted   BASEPLT TIB JOURNEY NONPOR SZ5 LT - [id number] Implant BASEPLT TIB JOURNEY NONPOR SZ5 LT  [employer] [id number] Left 1 Implanted   COMP FEM JOURNEY2 OXINIUM CR LT SZ5 - [id number] Implant COMP FEM JOURNEY2 OXINIUM CR LT SZ5  [employer] [id number] Left 1 Implanted   SIZE 5-6 LEFT 9MM JOURNEY II XLPE A/P 52MM M/L 74MM DEEP DISHED ARTICULAR INSERT       [employer] [id number] Left 1 Implanted       Implants utilized: I used the Smith nephew journey 2 system.  I used a size 5 tibial baseplate.  Size 5  femur.  9 DISHED mm  polyethylene insert.  35 patella.    Tourniquet Time: Was 52 minutes.      Medications: Note that we gave 1 g IV tranexamic acid when the cement was mixed.      Estimated Blood Loss:   minimal    Specimens: * No orders in the log *     Complications:  None.      Quality Measures: I have documented the patient's current medications including dosage, frequency, and route of administration in medical record today. Conservative alternatives were discussed with the patient as part of the decision-making process prior to the procedure. The patient was evaluated immediately preoperatively for cardiovascular and thromboembolic risk factors.  There were no acute issues.  Prophylactic IV antibiotics were administered before the tourniquet went up.      Description of Procedure: After prep and drape, Left knee was approached via midline longitudinal anterior incision. Medial parapatellar arthrotomy was extended to the vastus medialis obliquus which was split in line with the fibers near the medial border, in keeping with minimally invasive technique. Patella was osteotomized proper depth for the patella implant. [place] holes are created. Patella was subluxed and protected. Intramedullary instrumentation was used on each side of the joint; careful and thorough canal content irrigation. I cut the femur 10 mm depth, 5° valgus controlling rotation. The femur  was sized appropriatelyt. Anterior, posterior, and chamfer cuts were made. Tibia is cut 10 mm depth, 5° of posterior slope. Meniscectomies are completed. Trial reduction is performed. Rotation is marked and keel slot was created.      Bony surface was thoroughly cleaned and dried. I injected the posterior capsule and medial lateral gutter with Exparel solution containing 20 mL Exparel, 25 mL 0.25% bupivacaine with epinephrine, 20 mL saline. Care was taken to protect popliteal neurovascular structures and the peroneal nerve. I cemented the tibial baseplate,  Femur, and patella.  Trial reduction revealed a 9 mm DISHED polyethylene insert best balanced stability and range of motion, and is locked in the tibial tray.      Tourniquet was released; hemostasis obtained. Wound was closed in layers with #1 Vicryl on the capsule,  2-0 Vicryl in subcutaneous tissue, and staples in the skin over a 1/8-inch drain. Note that I injected my capsule with my Exparel solution during closure.      Ralph Panchal MD  1/18/2018  12:41 PM

## 2018-01-18 NOTE — PLAN OF CARE
Problem: Patient Care Overview (Adult)  Goal: Plan of Care Review  Outcome: Ongoing (interventions implemented as appropriate)   01/18/18 3358   Coping/Psychosocial Response Interventions   Plan Of Care Reviewed With patient   Patient Care Overview   Progress improving   Outcome Evaluation   Outcome Summary/Follow up Plan s/p LTKA. VSS. Pain controlled with PO pain med. Worked with therapy today. Ambulating in paz with assist x1 and walker. DTV 2000. Discussed BP monitoring and med r/t HTN. Plans to d/c home with HH possibly tomorrow.      Goal: Adult Individualization and Mutuality  Outcome: Ongoing (interventions implemented as appropriate)      Problem: Perioperative Period (Adult)  Goal: Signs and Symptoms of Listed Potential Problems Will be Absent or Manageable (Perioperative Period)  Outcome: Ongoing (interventions implemented as appropriate)      Problem: Fall Risk (Adult)  Goal: Identify Related Risk Factors and Signs and Symptoms  Outcome: Outcome(s) achieved Date Met: 01/18/18    Goal: Absence of Falls  Outcome: Ongoing (interventions implemented as appropriate)      Problem: Knee Replacement, Total (Adult)  Goal: Signs and Symptoms of Listed Potential Problems Will be Absent or Manageable (Knee Replacement, Total)  Outcome: Ongoing (interventions implemented as appropriate)

## 2018-01-18 NOTE — THERAPY EVALUATION
Acute Care - Physical Therapy Initial Evaluation  [facility]     Patient Name: Christiano Barr  : 1960  MRN: [id number]  Today's Date: 2018   Onset of Illness/Injury or Date of Surgery Date: 18 (s/p L TKA)  Date of Referral to PT: 18  Referring Physician: Dr. Panchal      Admit Date: 2018     Visit Dx:    ICD-10-CM ICD-9-CM   1. Status post total left knee replacement Z96.652 V43.65     Patient Active Problem List   Diagnosis   • DJD (degenerative joint disease) of knee     Past Medical History:   Diagnosis Date   • Acid reflux    • Arthritis     OSTEO   • Hypertension    • Risk factors for obstructive sleep apnea     YOAN 7     Past Surgical History:   Procedure Laterality Date   • APPENDECTOMY     • INGUINAL HERNIA REPAIR Right           PT ASSESSMENT (last 72 hours)      PT Evaluation       18 1500 18 1450    Rehab Evaluation    Document Type evaluation  -MA     Subjective Information agree to therapy;complains of;fatigue;pain  -MA     Patient Effort, Rehab Treatment good  -MA     Symptoms Noted During/After Treatment fatigue;increased pain  -MA     General Information    Patient Profile Review yes  -MA     Onset of Illness/Injury or Date of Surgery Date 18   s/p L TKA  -MA     Referring Physician Dr. Panchal  -MA     General Observations supine in bed with HOB elevated, ice pack applied, SCD, no acute distress noted at rest  -MA     Pertinent History Of Current Problem s/p L TKA  -MA     Precautions/Limitations fall precautions  -MA     Prior Level of Function independent:;all household mobility  -MA     Equipment Currently Used at Home  none  -AR    Plans/Goals Discussed With patient;agreed upon  -MA     Benefits Reviewed patient:;improve function;increase independence  -MA     Barriers to Rehab none identified  -MA     Living Environment    Living Environment Comment 2 steps to enter home  -MA     Clinical Impression    Date of Referral to PT 18  -MA     PT  Diagnosis impaired funct mobility 2' post op  -MA     Criteria for Skilled Therapeutic Interventions Met yes;treatment indicated  -MA     Pathology/Pathophysiology Noted (Describe Specifically for Each System) musculoskeletal  -MA     Impairments Found (describe specific impairments) aerobic capacity/endurance;gait, locomotion, and balance;ROM  -MA     Rehab Potential good, to achieve stated therapy goals  -MA     Vital Signs    Pretreatment Heart Rate (beats/min) 67  -MA     Pre SpO2 (%) 97  -MA     O2 Delivery Pre Treatment supplemental O2  -MA     Post SpO2 (%) 95  -MA     O2 Delivery Post Treatment supplemental O2  -MA     Pain Assessment    Pain Assessment 0-10  -MA     Pain Score 3  -MA     Post Pain Score 7  -MA     Pain Type Surgical pain  -MA     Pain Location Knee  -MA     Pain Orientation Left  -MA     Pain Intervention(s) Cold applied;Repositioned;Ambulation/increased activity;Rest   Lynn, RN notified  -MA     Response to Interventions tolerated  -MA     Vision Assessment/Intervention    Visual Impairment WFL with corrective lenses  -MA     Cognitive Assessment/Intervention    Current Cognitive/Communication Assessment functional  -MA     Orientation Status oriented x 4  -MA     Follows Commands/Answers Questions 100% of the time;able to follow multi-step instructions  -MA     Personal Safety WNL/WFL;good awareness, safety precautions  -MA     Personal Safety Interventions fall prevention program maintained;gait belt;nonskid shoes/slippers when out of bed  -MA     ROM (Range of Motion)    General ROM Detail L LE limited 2' soreness  -MA     MMT (Manual Muscle Testing)    General MMT Assessment Detail L LE post op weakness  -MA     Mobility Assessment/Training    Extremity Weight-Bearing Status left lower extremity  -MA     Left Lower Extremity Weight-Bearing weight-bearing as tolerated  -MA     Bed Mobility, Assessment/Treatment    Bed Mobility, Assistive Device bed rails;head of bed elevated  -MA      Bed Mob, Supine to Sit, [place] supervision required  -MA     Bed Mob, Sit to Supine, [place] not tested  -MA     Bed Mobility, Safety Issues decreased use of legs for bridging/pushing;impaired trunk control for bed mobility  -MA     Bed Mobility, Impairments pain;strength decreased  -MA     Transfer Assessment/Treatment    Transfers, Sit-Stand [place] stand by assist  -MA     Transfers, Stand-Sit [place] stand by assist  -MA     Transfers, Sit-Stand-Sit, Assist Device rolling walker  -MA     Transfer, Safety Issues sequencing ability decreased;step length decreased;weight-shifting ability decreased  -MA     Transfer, Impairments pain;sensation decreased  -MA     Transfer, Comment Cues for hand placement prior to transfer  -MA     Gait Assessment/Treatment    Gait, [place] Level contact guard assist  -MA     Gait, Assistive Device rolling walker  -MA     Gait, Distance (Feet) 175  -MA     Gait, Gait Pattern Analysis 3-point gait  -MA     Gait, Gait Deviations left:;antalgic;step length decreased;stride length decreased;artie decreased;decreased heel strike  -MA     Gait, Safety Issues step length decreased;sequencing ability decreased;weight-shifting ability decreased  -MA     Gait, Impairments pain;strength decreased  -MA     Gait, Comment Distance limited 2' pain  -MA     Therapy Exercises    Exercise Protocols total knee  -MA     Total Knee Exercises left:;10 reps;completed protocol  -MA     Positioning and Restraints    Pre-Treatment Position in bed  -MA     Post Treatment Position chair  -MA     In Chair notified nsg;sitting;call light within reach;encouraged to call for assist;legs elevated   ice pack applied, O2  -MA       01/18/18 6969       General Information    Equipment Currently Used at Home none  -MD     Living Environment    Lives With spouse  -MD     Living Arrangements house  -MD     Home Accessibility no concerns  -MD     Stair Railings at Home none  -MD     Type of  Financial/Environmental Concern none  -MD     Transportation Available car  -MD       User Key  (r) = Recorded By, (t) = Taken By, (c) = Cosigned By    Initials Name Provider Type    MD Nasima Pak, RN Registered Nurse    JUNIOR Son PT Physical Therapist    TING Soto, RN Registered Nurse          Physical Therapy Education     Title: PT OT SLP Therapies (Done)     Topic: Physical Therapy (Done)     Point: Mobility training (Done)    Learning Progress Summary    Learner Readiness Method Response Comment Documented by Status   Patient Acceptance E [facility] 01/18/18 1547 Done               Point: Home exercise program (Done)    Learning Progress Summary    Learner Readiness Method Response Comment Documented by Status   Patient Acceptance E [facility] 01/18/18 1547 Done               Point: Body mechanics (Done)    Learning Progress Summary    Learner Readiness Method Response Comment Documented by Status   Patient Acceptance E [facility] 01/18/18 1547 Done               Point: Precautions (Done)    Learning Progress Summary    Learner Readiness Method Response Comment Documented by Status   Patient Acceptance E [facility] 01/18/18 1547 Done                      User Key     Initials Effective Dates Name Provider Type Discipline    MA 12/13/16 -  Judith Son, PT Physical Therapist PT                PT Recommendation and Plan  Anticipated Discharge Disposition: home with assist, home with home health  Planned Therapy Interventions: balance training, bed mobility training, gait training, home exercise program, patient/family education, postural re-education, ROM (Range of Motion), stair training, strengthening, transfer training  PT Frequency: 2 times/day  Plan of Care Review  Plan Of Care Reviewed With: patient, family  Outcome Summary/Follow up Plan: Patient is a pleasant 57 y.o. male s/p L TKA with expected post op weakness and impaired funct mobility. Patient is independent with all ADLs at baseline and  does not use an AD. Assist x 1 required for all mobility. Ambulated 175' with RW. Patient will benefit from skilled PT services acutely to address deficits as able and improve level of independence. Anticipate DC home tomorrow with  PT services for continued rehabilitation.          IP PT Goals       01/18/18 1602          Bed Mobility PT LTG    Bed Mobility PT LTG, Date Established 01/18/18  -MA      Bed Mobility PT LTG, Time to Achieve 1 wk  -MA      Bed Mobility PT LTG, Activity Type all bed mobility  -MA      Bed Mobility PT LTG, [place] Level supervision required  -MA      Transfer Training PT LTG    Transfer Training PT LTG, Date Established 01/18/18  -MA      Transfer Training PT LTG, Time to Achieve 1 wk  -MA      Transfer Training PT LTG, Activity Type all transfers  -MA      Transfer Training PT LTG, [place] Level supervision required  -MA      Transfer Training PT LTG, Assist Device walker, rolling  -MA      Gait Training PT LTG    Gait Training Goal PT LTG, Date Established 01/18/18  -MA      Gait Training Goal PT LTG, Time to Achieve 1 wk  -MA      Gait Training Goal PT LTG, [place] Level supervision required  -MA      Gait Training Goal PT LTG, Assist Device walker, rolling  -MA      Gait Training Goal PT LTG, Distance to Achieve 150  -MA      Stair Training PT LTG    Stair Training Goal PT LTG, Date Established 01/18/18  -MA      Stair Training Goal PT LTG, Time to Achieve 1 wk  -MA      Stair Training Goal PT LTG, Number of Steps 2  -MA      Stair Training Goal PT LTG, [place] Level contact guard assist  -MA      Stair Training Goal PT LTG, Assist Device 1 handrail  -MA      Range of Motion PT LTG    Range of Motion Goal PT LTG, Date Established 01/18/18  -MA      Range of Motion Goal PT LTG, Time to Achieve 1 wk  -MA      Range fo Motion Goal PT LTG, Joint L knee  -MA      Range of Motion Goal PT LTG, AROM Measure 5-90'  -MA        User Key  (r) = Recorded By, (t) = Taken By,  (c) = Cosigned By    Initials Name Provider Type    JUNIOR Son PT Physical Therapist                Outcome Measures       01/18/18 1500          How much help from another person do you currently need...    Turning from your back to your side while in flat bed without using bedrails? 4  -MA      Moving from lying on back to sitting on the side of a flat bed without bedrails? 4  -MA      Moving to and from a bed to a chair (including a wheelchair)? 3  -MA      Standing up from a chair using your arms (e.g., wheelchair, bedside chair)? 3  -MA      Climbing 3-5 steps with a railing? 3  -MA      To walk in hospital room? 3  -MA      AM-PAC 6 Clicks Score 20  -MA      Functional Assessment    Outcome Measure Options AM-PAC 6 Clicks Basic Mobility (PT)  -MA        User Key  (r) = Recorded By, (t) = Taken By, (c) = Cosigned By    Initials Name Provider Type    JUNIOR Son PT Physical Therapist           Time Calculation:         PT Charges       01/18/18 1604 01/18/18 1544       Time Calculation    Start Time 1540  -MA 1540  -MA     Stop Time 1602  -MA      Time Calculation (min) 22 min  -MA      PT Received On 01/18/18  -MA      PT - Next Appointment 01/19/18  -MA      PT Goal Re-Cert Due Date 01/25/18  -MA        User Key  (r) = Recorded By, (t) = Taken By, (c) = Cosigned By    Initials Name Provider Type    JUNIOR Son PT Physical Therapist          Therapy Charges for Today     Code Description Service Date Service Provider Modifiers Qty    05000624148 HC PT EVAL MOD COMPLEXITY 2 1/18/2018 Judith Son, PT GP 1    83520239219 HC PT THER PROC EA 15 MIN 1/18/2018 Judith Son PT GP 1          PT G-Codes  Outcome Measure Options: AM-PAC 6 Clicks Basic Mobility (PT)      Judith Son PT  1/18/2018

## 2018-01-18 NOTE — ANESTHESIA PROCEDURE NOTES
Airway  Urgency: elective    Date/Time: 1/18/2018 10:30 AM  Airway not difficult    General Information and Staff    Patient location during procedure: OR  Anesthesiologist: MISA BRANCH  CRNA: COLIN CARRERO    Indications and Patient Condition  Indications for airway management: airway protection    Preoxygenated: yes  Mask difficulty assessment: 1 - vent by mask    Final Airway Details  Final airway type: endotracheal airway      Successful airway: ETT  Cuffed: yes   Successful intubation technique: direct laryngoscopy  Facilitating devices/methods: intubating stylet  Endotracheal tube insertion site: oral  Blade: Sterling  Blade size: #2  ETT size: 8.0 mm  Cormack-Lehane Classification: grade I - full view of glottis  Placement verified by: chest auscultation and capnometry   Cuff volume (mL): 7  Measured from: teeth  ETT to teeth (cm): 21  Number of attempts at approach: 1

## 2018-01-18 NOTE — PLAN OF CARE
Problem: Patient Care Overview (Adult)  Goal: Plan of Care Review  Outcome: Ongoing (interventions implemented as appropriate)   01/18/18 0725   Coping/Psychosocial Response Interventions   Plan Of Care Reviewed With patient   Patient Care Overview   Progress progress toward functional goals as expected     Goal: Adult Individualization and Mutuality  Outcome: Ongoing (interventions implemented as appropriate)   01/18/18 0725   Individualization   Patient Specific Preferences justin     Goal: Discharge Needs Assessment  Outcome: Ongoing (interventions implemented as appropriate)   01/18/18 0725   Discharge Needs Assessment   Concerns To Be Addressed no discharge needs identified       Problem: Perioperative Period (Adult)  Goal: Signs and Symptoms of Listed Potential Problems Will be Absent or Manageable (Perioperative Period)  Outcome: Ongoing (interventions implemented as appropriate)   01/18/18 0725   Perioperative Period   Problems Assessed (Perioperative Period) all   Problems Present (Perioperative Period) none

## 2018-01-19 ENCOUNTER — APPOINTMENT (OUTPATIENT)
Dept: CT IMAGING | Facility: HOSPITAL | Age: 58
End: 2018-01-19

## 2018-01-19 VITALS
OXYGEN SATURATION: 97 % | SYSTOLIC BLOOD PRESSURE: 122 MMHG | RESPIRATION RATE: 16 BRPM | HEIGHT: 67 IN | TEMPERATURE: 99 F | HEART RATE: 63 BPM | WEIGHT: 209.19 LBS | DIASTOLIC BLOOD PRESSURE: 68 MMHG | BODY MASS INDEX: 32.83 KG/M2

## 2018-01-19 LAB
ANION GAP SERPL CALCULATED.3IONS-SCNC: 15.6 MMOL/L
BUN BLD-MCNC: 25 MG/DL (ref 6–20)
BUN/CREAT SERPL: 19.8 (ref 7–25)
CALCIUM SPEC-SCNC: 8.5 MG/DL (ref 8.6–10.5)
CHLORIDE SERPL-SCNC: 95 MMOL/L (ref 98–107)
CO2 SERPL-SCNC: 22.4 MMOL/L (ref 22–29)
CREAT BLD-MCNC: 1.26 MG/DL (ref 0.76–1.27)
GFR SERPL CREATININE-BSD FRML MDRD: 59 ML/MIN/1.73
GLUCOSE BLD-MCNC: 122 MG/DL (ref 65–99)
HCT VFR BLD AUTO: 39.5 % (ref 40.4–52.2)
HGB BLD-MCNC: 13.5 G/DL (ref 13.7–17.6)
POTASSIUM BLD-SCNC: 4 MMOL/L (ref 3.5–5.2)
SODIUM BLD-SCNC: 133 MMOL/L (ref 136–145)

## 2018-01-19 PROCEDURE — 25010000002 KETOROLAC TROMETHAMINE PER 15 MG: Performed by: ORTHOPAEDIC SURGERY

## 2018-01-19 PROCEDURE — 97150 GROUP THERAPEUTIC PROCEDURES: CPT

## 2018-01-19 PROCEDURE — 25010000003 CEFAZOLIN IN DEXTROSE 2-4 GM/100ML-% SOLUTION: Performed by: ORTHOPAEDIC SURGERY

## 2018-01-19 PROCEDURE — 25010000002 FONDAPARINUX PER 0.5 MG: Performed by: ORTHOPAEDIC SURGERY

## 2018-01-19 PROCEDURE — 85014 HEMATOCRIT: CPT | Performed by: ORTHOPAEDIC SURGERY

## 2018-01-19 PROCEDURE — 80048 BASIC METABOLIC PNL TOTAL CA: CPT | Performed by: ORTHOPAEDIC SURGERY

## 2018-01-19 PROCEDURE — 85018 HEMOGLOBIN: CPT | Performed by: ORTHOPAEDIC SURGERY

## 2018-01-19 PROCEDURE — 97110 THERAPEUTIC EXERCISES: CPT

## 2018-01-19 PROCEDURE — 71250 CT THORAX DX C-: CPT

## 2018-01-19 RX ORDER — OXYCODONE HYDROCHLORIDE AND ACETAMINOPHEN 5; 325 MG/1; MG/1
1-2 TABLET ORAL EVERY 4 HOURS PRN
Qty: 75 TABLET | Refills: 0 | Status: SHIPPED | OUTPATIENT
Start: 2018-01-19 | End: 2018-01-28

## 2018-01-19 RX ORDER — FONDAPARINUX SODIUM 2.5 MG/.5ML
2.5 INJECTION SUBCUTANEOUS ONCE
Status: COMPLETED | OUTPATIENT
Start: 2018-01-19 | End: 2018-01-19

## 2018-01-19 RX ADMIN — KETOROLAC TROMETHAMINE 30 MG: 30 INJECTION, SOLUTION INTRAMUSCULAR at 10:22

## 2018-01-19 RX ADMIN — OXYCODONE HYDROCHLORIDE AND ACETAMINOPHEN 1 TABLET: 5; 325 TABLET ORAL at 04:01

## 2018-01-19 RX ADMIN — ASPIRIN 325 MG: 325 TABLET, COATED ORAL at 10:21

## 2018-01-19 RX ADMIN — FONDAPARINUX SODIUM 2.5 MG: 2.5 INJECTION, SOLUTION SUBCUTANEOUS at 10:22

## 2018-01-19 RX ADMIN — CEFAZOLIN SODIUM 2 G: 2 INJECTION, SOLUTION INTRAVENOUS at 03:57

## 2018-01-19 RX ADMIN — KETOROLAC TROMETHAMINE 30 MG: 30 INJECTION, SOLUTION INTRAMUSCULAR at 03:56

## 2018-01-19 RX ADMIN — OXYCODONE HYDROCHLORIDE AND ACETAMINOPHEN 1 TABLET: 5; 325 TABLET ORAL at 15:04

## 2018-01-19 RX ADMIN — FERROUS SULFATE TAB 325 MG (65 MG ELEMENTAL FE) 325 MG: 325 (65 FE) TAB at 10:21

## 2018-01-19 NOTE — PROGRESS NOTES
"/55 (BP Location: Right arm, Patient Position: Lying)  Pulse 53  Temp 97.1 °F (36.2 °C) (Oral)   Resp 16  Ht 170.2 cm (67.01\")  Wt 94.9 kg (209 lb 3 oz)  SpO2 98%  BMI 32.76 kg/m2    Lab Results (last 24 hours)     Procedure Component Value Units Date/Time    Basic Metabolic Panel [[id number]] Collected:  01/19/18 0600    Specimen:  Blood Updated:  01/19/18 0616    Hemoglobin & Hematocrit, Blood [[id number]]  (Abnormal) Collected:  01/19/18 0600    Specimen:  Blood Updated:  01/19/18 0626     Hemoglobin 13.5 (L) g/dL      Hematocrit 39.5 (L) %           Imaging Results (last 24 hours)     Procedure Component Value Units Date/Time    XR Knee 1 or 2 View Left [[id number]] Collected:  01/18/18 1250     Updated:  01/18/18 1253    Narrative:       2 VIEWS OF THE LEFT KNEE     HISTORY: Postoperative knee pain     FINDINGS:  1. Satisfactory appearance following total knee arthroplasty, no  evidence of a complication.     This report was finalized on 1/18/2018 12:50 PM by Dr. Ernie Ruiz MD.             Patient Care Team:  Daniel Johnson MD as PCP - General (Family Medicine)    SUBJECTIVE  Comfortable  Problems voiding (in & out cath last night)_  PHYSICAL EXAM   NV intact  Active Problems:    DJD (degenerative joint disease) of knee      PLAN / DISPOSITION:  Arixtra one time dose  HH discharge if:  1. Able to void  2. If CT of chest is OK in follow up of pre op CXR  Ralph Panchal MD  01/19/18  6:42 AM      "

## 2018-01-19 NOTE — DISCHARGE PLACEMENT REQUEST
"Juliet Mattson (57 y.o. Male)     Date of Birth Social Security Number Address Home Phone MRN    1960  [id number]   [place], [place] [address] [phone] [id number]    Druze Marital Status          [employer]        Admission Date Admission Type Admitting Provider Attending Provider Department, Room/Bed    1/18/18 Elective Ralph Panchal MD Sweet, Richard A, MD [address], [address]    Discharge Date Discharge Disposition Discharge Destination         Home or Self Care             Attending Provider: Ralph Panchal MD     Allergies:  No Known Allergies    Isolation:  None   Infection:  None   Code Status:  FULL    Ht:  170.2 cm (67.01\")   Wt:  94.9 kg (209 lb 3 oz)    Admission Cmt:  None   Principal Problem:  None                Active Insurance as of 1/18/2018     Primary Coverage     Payor Plan Insurance Group Employer/Plan Group    P[employer]ORT [employer]ORT MEDICAID     Payor Plan Address Payor Plan Phone Number Effective From Effective To    [address] [phone] 1/1/2017     [place] [address]       Subscriber Name Subscriber Birth Date Member ID       JULIET MATTSON 1960 [id number]                 Emergency Contacts      (Rel.) Home Phone Work Phone Mobile Phone    Nasima Mattson (Spouse) [phone] -- [phone]              "

## 2018-01-19 NOTE — DISCHARGE SUMMARY
Discharge Summary   Ralph Panchal M.D.    NAME: Christiano Barr ADMIT: 2018   : 1960  PCP: Daniel Johnson MD    MRN: [id number] LOS: 1 days   AGE/SEX: 57 y.o. male  ROOM: P794/1       Date of Discharge:  18    Primary Discharge Diagnosis:  DJD (degenerative joint disease) of knee [M17.10]    Secondary Discharge Diagnosis:    Problem List Items Addressed This Visit     None      Visit Diagnoses     Status post total left knee replacement    -  Primary    Relevant Orders    Referral to home health    Activity as tolerated    Discharge dressing/ wound instructions          Procedures Performed:  Left Total Knee Arthroplasty    Hospital Course:    Christiano Barr is a 57 y.o.  male who underwent successful Left Total Knee Arthroplasty on 2018.  Christiano Barr was started on Aspirin 325mg po daily immediately post-operatively for DVT prophylaxis.  On post-op day 1 the patients dressing was changed, drain removed and their incision was clean, with no signs of infection and their calf was soft, with no signs of DVT.  The patient progressed well with physical therapy and the patients hemoglobin remained stable. On post-operative day 1 the patient was felt ready for discharge.      Total Knee Joint Replacement Discharge Instructions:    I. ACTIVITIES:    1. Exercises:  ? Complete exercise program as taught by the hospital physical therapist 2 times per day  ? Exercise program will be advanced by the physical therapist  ? During the day be up ambulating every 2 hours (while awake) for short distances  ? Complete the ankle pump exercises at least 10 times per hour (while awake)  ? Elevate legs most of the day the first week post operatively and thereafter elevate legs when in bed and for at least 30 minutes during the day. Caution must be taken to avoid pillow placement under the bend of the knee as this can led to flexion contractures of the knee.  ? Use cold packs 20-30 minutes approximately 5  times per day. This should be done before and after completing your exercises and at any time you are experiencing pain/ stiffness in your operative extremity.    2. Activities of Daily Living:  ? No tub baths, hot tubs, or swimming pools for 4 weeks  ? May shower and let water run over the incision on post-operative day #7 if no drainage. Do not scrub or rub the incision. Simply let the water run over the incision and pat dry.    II. Precautions:  ? Everyone that comes near you should wash their hands  ? No elective dental, genital-urinary, or colon procedures or surgical procedures for 12 weeks after surgery unless absolutely necessary.  ? If dental work or surgical procedure is deemed absolutely necessary during the first 12 weeks, you will need to contact your surgeon as you will need to take antibiotics 1 hour prior to any dental work (including teeth cleanings).  ? Please discuss with your surgeon prophylactic antibiotics as the length of time this intervention will be necessary for you varies with each patient’s health history and situation.  ? Avoid sick people. If you must be around someone who is ill, they should wear a mask.  ? Avoid visits to the Emergency Room or Urgent Care unless you are having a life threatening event.     III. INCISION CARE:  ? Wash your hands prior to dressing changes  ? Change the dressing as needed to keep incision clean and dry. Utilize dry gauze and paper tape. Avoid touching the side of the gauze that goes against the incision with your hands.  ? No creams or ointments to the incision  ? May remove dressing once the incision is free of drainage  ? Do not touch or pick at the incision  ? Check incision every day and notify surgeon immediately if any of the following signs or symptoms are noted:  o Increase in redness  o Increase in swelling around the incision and of the entire extremity  o Increase in pain  o Drainage oozing from the incision  o Pulling apart of the edges of  the incision  o Increase in overall body temperature (greater than 100.5 degrees)  ? Your surgeon will instruct you regarding suture or staple removal    IV. Medications:     1. Anticoagulants: You will be discharged on an anticoagulant. This is a prophylactic medication that helps prevent blood clots during your post-operative period. The type and length of dosage varies based on your individual needs, procedure performed, and surgeon’s preference.    ? While taking the anticoagulant, you should avoid taking any additional aspirin, ibuprofen (Advil or Motrin), Aleve (Naprosyn) or other non-steroidal anti-inflammatory medications.   ? Notify surgeon immediately if any fanta bleeding is noted in the urine, stool, emesis, or from the nose or the incision. Blood in the stool will often appear as black rather than red. Blood in urine may appear as pink. Blood in emesis may appear as brown/black like coffee grounds.  ? You will need to apply pressure for longer periods of time to any cuts or abrasions to stop bleeding  ? Avoid alcohol while taking anticoagulants    2. Stool Softeners: You will be at greater risk of constipation after surgery due to being less mobile and the pain medications.     ? Take stool softeners as instructed by your surgeon while on pain medications. Bran cereal is most effective. Over the counter Colace 100 mg 1-2 capsules twice daily.   ? Drink plenty of fluids, and eat fruits and vegetables during your recovery time    3. Pain Medications utilized after surgery are narcotics and the law requires that the following information be given to all patients that are prescribed narcotics:    ? CLASSIFICATION: Pain medications are called Opioids and are narcotics  ? LEGALITIES: It is illegal to share narcotics with others and to drive within 24 hours of taking narcotics  ? POTENTIAL SIDE EFFECTS: Potential side effects of opioids include: nausea, vomiting, itching, dizziness, drowsiness, dry mouth,  constipation, and difficulty urinating.  ? POTENTIAL ADVERSE EFFECTS:   o Opioid tolerance can develop with use of pain medications and this simply means that it requires more and more of the medication to control pain; however, this is seen more in patients that use opioids for longer periods of time.  o Opioid dependence can develop with use of Opioids and this simply means that to stop the medication can cause withdrawal symptoms; however, this is seen with patients that use Opioids for longer periods of time.  o Opioid addiction can develop with use of Opioids and the incidence of this is very unlikely in patients who take the medications as ordered and stop the medications as instructed.  o Opioid overdose can be dangerous, but is unlikely when the medication is taken as ordered and stopped when ordered. It is important not to mix opioids with alcohol or with and type of sedative such as Benadryl as this can lead to over sedation and respiratory difficulty.  ? DOSAGE:   o Pain medications will need to be taken consistently for the first week to decrease pain and promote adequate pain relief and participation in physical therapy.  o After the initial surgical pain begins to resolve, you may begin to decrease the pain medication. By the end of 6-8 weeks, you should be off of pain medications.  o Refills will not be given by the office during evening hours, on weekends, or after 6-8 weeks post-op.  o To seek refills on pain medications during the initial 6 week post-operative period, you must call the office 48 hours in advance to request the refill. The office will then notify you when to  the prescription. DO NOT wait until you are out of the medication to request a refill.    V. FOLLOW-UP VISITS:  ? You will need to follow up in the office with your surgeon in 3 weeks. Please call this number [phone] to schedule this appointment.  If you have any concerns or suspected complications prior to your  follow up visit, please call your surgeons office. Do not wait until your appointment time if you suspect complications. These will need to be addressed in the office promptly.    Discharge Medications:     1) Percocet 5/325  1-2 po q 4-6 hours for pain control  2)  Aspirin 325 mg po daily for 6 weeks.     NOTE: We will discharge him today if able to void and CT of chest ok    Ralph Panchal MD  1/19/2018  6:51 AM

## 2018-01-19 NOTE — PLAN OF CARE
Problem: Patient Care Overview (Adult)  Goal: Plan of Care Review  Outcome: Ongoing (interventions implemented as appropriate)   01/19/18 0811   Coping/Psychosocial Response Interventions   Plan Of Care Reviewed With patient   Outcome Evaluation   Outcome Summary/Follow up Plan po pain medication effective, unable to void, in and out cath for 700cc/ waiting to void this am , bladder scan of 284. to have repeat CT scan of chest today for nodules, if unable to void to consult first urology and insert luna catheter.. Discussed blood pressure monitoring at home.     Goal: Adult Individualization and Mutuality  Outcome: Ongoing (interventions implemented as appropriate)    Goal: Discharge Needs Assessment  Outcome: Ongoing (interventions implemented as appropriate)      Problem: Perioperative Period (Adult)  Goal: Signs and Symptoms of Listed Potential Problems Will be Absent or Manageable (Perioperative Period)  Outcome: Ongoing (interventions implemented as appropriate)      Problem: Fall Risk (Adult)  Goal: Absence of Falls  Outcome: Ongoing (interventions implemented as appropriate)      Problem: Knee Replacement, Total (Adult)  Goal: Signs and Symptoms of Listed Potential Problems Will be Absent or Manageable (Knee Replacement, Total)  Outcome: Ongoing (interventions implemented as appropriate)

## 2018-01-19 NOTE — NURSING NOTE
Spoke with Natty r/t CT results. Dr. Panchal unavailable. Dr. Fontanez notified per Natty. Dr. Fontanez ok with d/c. Pt is to follow up with PCP. CT to be sent to PCP for follow up CT in 6-9 months.

## 2018-01-19 NOTE — PLAN OF CARE
Problem: Patient Care Overview (Adult)  Goal: Plan of Care Review  Outcome: Ongoing (interventions implemented as appropriate)   01/19/18 1606   Coping/Psychosocial Response Interventions   Plan Of Care Reviewed With patient   Patient Care Overview   Progress improving   Outcome Evaluation   Outcome Summary/Follow up Plan VSS. Pain controlled with PO pain med. Able to void without difficulty. CT of chest complete. Ok to d/c. Pt to f/u with PCP and have repeat CT in 6-9 months. Discussed BP monitoring and med r/t HTN. Discharging today with HH.        Problem: Perioperative Period (Adult)  Goal: Signs and Symptoms of Listed Potential Problems Will be Absent or Manageable (Perioperative Period)  Outcome: Ongoing (interventions implemented as appropriate)      Problem: Fall Risk (Adult)  Goal: Absence of Falls  Outcome: Ongoing (interventions implemented as appropriate)      Problem: Knee Replacement, Total (Adult)  Goal: Signs and Symptoms of Listed Potential Problems Will be Absent or Manageable (Knee Replacement, Total)  Outcome: Ongoing (interventions implemented as appropriate)

## 2018-01-19 NOTE — PLAN OF CARE
Problem: Patient Care Overview (Adult)  Goal: Plan of Care Review  Outcome: Ongoing (interventions implemented as appropriate)   01/19/18 1204   Coping/Psychosocial Response Interventions   Plan Of Care Reviewed With patient   Patient Care Overview   Progress progress toward functional goals as expected   Outcome Evaluation   Outcome Summary/Follow up Plan Pt increased with stair navigation and amb safety with RWX as well as strength and ROM in the L LE

## 2018-01-19 NOTE — NURSING NOTE
Spoke with Natty at Dr. Panchal office to call in results of chest CT. Awaiting return call from Dr. Panchal to d/c pt .

## 2018-01-19 NOTE — PROGRESS NOTES
Continued Stay Note  [facility]     Patient Name: Christiano Barr  MRN: [id number]  Today's Date: 1/19/2018    Admit Date: 1/18/2018          Discharge Plan       01/19/18 1403    Case Management/Social Work Plan    Plan Home with [facility]    Patient/Family In Agreement With Plan yes    Additional Comments Inbound call from [facility] and they are out of network with the patient's insurance. [facility] .    1/19/18 1426 - Vmm from [facility]  and they can accept the patient.       01/19/18 1306    Case Management/Social Work Plan    Plan Plans home with [facility]    Patient/Family In Agreement With Plan yes    Additional Comments Confirmed face sheet correct. No IMM (Passport).               Discharge Codes     None        Expected Discharge Date and Time     Expected Discharge Date Expected Discharge Time    Jan 19, 2018             Eric Tate RN

## 2018-01-19 NOTE — DISCHARGE PLACEMENT REQUEST
"Juliet Mattson (57 y.o. Male)     Date of Birth Social Security Number Address Home Phone MRN    1960  [id number]   [facility] [address] [phone] [id number]    Yarsani Marital Status          Mu-ism        Admission Date Admission Type Admitting Provider Attending Provider Department, Room/Bed    1/18/18 Elective Ralph Panchal MD Sweet, Richard A, MD [address], [address]    Discharge Date Discharge Disposition Discharge Destination         Home or Self Care             Attending Provider: Ralph Panchal MD     Allergies:  No Known Allergies    Isolation:  None   Infection:  None   Code Status:  FULL    Ht:  170.2 cm (67.01\")   Wt:  94.9 kg (209 lb 3 oz)    Admission Cmt:  None   Principal Problem:  None                Active Insurance as of 1/18/2018     Primary Coverage     Payor Plan Insurance Group Employer/Plan Group    PASSPORT PASSPORT MEDICAID     Payor Plan Address Payor Plan Phone Number Effective From Effective To    [address] [phone] 1/1/2017     [place] [address]       Subscriber Name Subscriber Birth Date Member ID       JULIET MATTSON 1960 [id number]                 Emergency Contacts      (Rel.) Home Phone Work Phone Mobile Phone    Nasima Mattson (Spouse) [phone] -- [phone]              "

## 2018-01-19 NOTE — NURSING NOTE
Spoke with Liza in CT r/t final results for CT to call in to Dr. Panchal for d/c. Told to call back in 20 min if final not showing in system.

## 2018-01-19 NOTE — PROGRESS NOTES
Continued Stay Note  [facility]     Patient Name: Christiano Barr  MRN: [id number]  Today's Date: 1/19/2018    Admit Date: 1/18/2018          Discharge Plan       01/19/18 1306    Case Management/Social Work Plan    Plan Plans home with [facility]    Patient/Family In Agreement With Plan yes    Additional Comments Confirmed face sheet correct. No IMM (Passport).               Discharge Codes     None        Expected Discharge Date and Time     Expected Discharge Date Expected Discharge Time    Jan 19, 2018             Eric Tate RN  Discharge Planning Assessment  [facility]     Patient Name: Christiano Barr  MRN: [id number]  Today's Date: 1/19/2018    Admit Date: 1/18/2018          Discharge Needs Assessment       01/19/18 1304    Discharge Needs Assessment    Anticipated Changes Related to Illness none    Equipment Currently Used at Home none    Equipment Needed After Discharge other (see comments)   Patient has a rolling walker in the room he said he borrowed from his brother.     Discharge Facility/Level Of Care Needs home with home health    Current Discharge Risk physical impairment    Discharge Disposition still a patient    Discharge Contact Information if Applicable Nasima Barr wife [phone]    Discharge Planning Comments Plans home with [facility]      01/19/18 1300    Living Environment    Lives With spouse    Living Arrangements house    Home Accessibility no concerns    Stair Railings at Home none    Type of Financial/Environmental Concern none    Transportation Available car;family or friend will provide    Living Environment    Primary Care Provided By spouse/significant other    Quality Of Family Relationships supportive;helpful;involved    Able to Return to Prior Living Arrangements yes    Discharge Needs Assessment    Concerns To Be Addressed basic needs concerns;discharge planning concerns;home safety concerns    Concerns Comments Patient requests  referral to anyone who is in network  with his insurance. Referral called to [facility].     Readmission Within The Last 30 Days no previous admission in last 30 days    Outpatient/Agency/Support Group Needs other (see comments)    Community Agency Name(S) Patient says he has not used HH or rehab.     Anticipated Changes Related to Illness none            Discharge Plan       01/19/18 1306    Case Management/Social Work Plan    Plan Plans home with [facility]    Patient/Family In Agreement With Plan yes    Additional Comments Confirmed face sheet correct. No IMM (Passport).         Discharge Placement     Facility/Agency Request Status Selected? Address Phone Number Fax Number    [facility] Pending - Request Sent     [address] [address] [phone]        Eric Tate RN 1/19/2018 13:12    Called to [facility]                   Expected Discharge Date and Time     Expected Discharge Date Expected Discharge Time    Jan 19, 2018               Demographic Summary       01/19/18 1257    Referral Information    Admission Type inpatient    Arrived From admitted as an inpatient;home or self-care    Referral Source admission list    Reason For Consult discharge planning    Record Reviewed clinical discipline documentation;history and physical;medical record    Contact Information    Permission Granted to Share Information With ;family/designee    Comments wife Nasima    Primary Care Physician Information    Name Dr. Daniel Johnson            Functional Status       01/19/18 1258    Functional Status Current    Ambulation 1-->assistive equipment    Transferring 1-->assistive equipment    Toileting 1-->assistive equipment    Bathing 2-->assistive person    Dressing 2-->assistive person    Eating 0-->independent    Communication 0-->understands/communicates without difficulty    Swallowing (if score 2 or more for any item, consult Rehab Services) 0-->swallows foods/liquids without difficulty     Current Functional Level Comment Came out of BR using his walker.    Change in Functional Status Since Onset of Current Illness/Injury yes    Functional Status Prior    Ambulation 0-->independent    Transferring 0-->independent    Toileting 0-->independent    Bathing 0-->independent    Dressing 0-->independent    Eating 0-->independent    Communication 0-->understands/communicates without difficulty    Swallowing 0-->swallows foods/liquids without difficulty    Activity Tolerance    Usual Activity Tolerance good    Current Activity Tolerance moderate            Psychosocial     None            Abuse/Neglect     None            Legal       01/19/18 1259    Legal    Legal Considerations patient/family ability to make health care decisions;patient/family management of healthcare needs;patient/ for healthcare needs;other (see comments);patient/family capacity to make sound judgments    Legal Concerns Patient says he does not have any advanced directives and his wife Nasima is his decision maker if he is not able.             Substance Abuse     None            Patient Forms     None          Eric Tate, RN

## 2018-01-19 NOTE — DISCHARGE INSTR - PHARMACY
Confirmed with patient and wife, they have been using [facility] in [place] but wife says it changed to a [facility] last night when she went to get her meds filled.

## 2018-01-19 NOTE — THERAPY TREATMENT NOTE
Acute Care - Physical Therapy Treatment Note  [facility]     Patient Name: Christiano Barr  : 1960  MRN: [id number]  Today's Date: 2018  Onset of Illness/Injury or Date of Surgery Date: 18 (s/p L TKA)  Date of Referral to PT: 18  Referring Physician: Dr. Panchal    Admit Date: 2018    Visit Dx:    ICD-10-CM ICD-9-CM   1. Status post total left knee replacement Z96.652 V43.65     Patient Active Problem List   Diagnosis   • DJD (degenerative joint disease) of knee               Adult Rehabilitation Note       18 1200          Rehab Assessment/Intervention    Discipline physical therapy assistant  -CW      Document Type therapy note (daily note)  -CW      Subjective Information agree to therapy;complains of;pain  -CW      Patient Effort, Rehab Treatment good  -CW      Precautions/Limitations fall precautions  -CW      Recorded by [CW] Ky Zuleta PTA      Vital Signs    O2 Delivery Pre Treatment room air  -CW      Recorded by [CW] Ky Zuleta PTA      Pain Assessment    Pain Assessment 0-10  -CW      Pain Score 4  -CW      Post Pain Score 4  -CW      Pain Type Surgical pain  -CW      Pain Location Knee  -CW      Pain Orientation Left  -CW      Pain Intervention(s) Repositioned;Ambulation/increased activity  -CW      Response to Interventions daniel  -CW      Recorded by [CW] Ky Zuleta PTA      Cognitive Assessment/Intervention    Current Cognitive/Communication Assessment functional  -CW      Orientation Status oriented x 4  -CW      Follows Commands/Answers Questions 100% of the time  -CW      Personal Safety WNL/WFL  -CW      Personal Safety Interventions fall prevention program maintained;gait belt;muscle strengthening facilitated;nonskid shoes/slippers when out of bed  -CW      Recorded by [CW] Ky Zuleta PTA      ROM (Range of Motion)    General ROM Detail 10-80  -CW      Recorded by [CW] Ky Zuleta PTA      Bed Mobility,  Assessment/Treatment    Bed Mob, Sit to Supine, [place] not tested  -CW      Bed Mobility, Comment in chair  -CW      Recorded by [CW] Ky Zuleta PTA      Transfer Assessment/Treatment    Transfers, Sit-Stand [place] supervision required  -CW      Transfers, Stand-Sit [place] supervision required  -CW      Transfers, Sit-Stand-Sit, Assist Device rolling walker  -CW      Recorded by [CW] Ky Zuleta PTA      Gait Assessment/Treatment    Gait, [place] Level supervision required  -CW      Gait, Assistive Device rolling walker  -CW      Gait, Distance (Feet) 150  -CW      Gait, Gait Pattern Analysis swing-to gait  -CW      Gait, Gait Deviations left:;antalgic;artie decreased;step length decreased;stride length decreased  -CW      Recorded by [JOHNATHAN] Ky Zuleta PTA      Stairs Assessment/Treatment    Number of Stairs 4  -CW      Stairs, Handrail Location both sides  -CW      Stairs, [place] Level supervision required  -CW      Stairs, Technique Used step to step (ascending);step to step (descending)  -CW      Stairs, Impairments ROM decreased;pain  -CW      Recorded by [CW] Ky Zuleta PTA      Therapy Exercises    Exercise Protocols total knee  -CW      Total Knee Exercises left:;15 reps;completed protocol  -CW      Recorded by [JOHNATHAN] Ky Zuleta PTA      Positioning and Restraints    Pre-Treatment Position sitting in chair/recliner  -CW      Post Treatment Position chair  -CW      In Chair notified nsg;reclined;call light within reach;encouraged to call for assist  -CW      Recorded by [CW] Ky Zuleta PTA        User Key  (r) = Recorded By, (t) = Taken By, (c) = Cosigned By    Initials Name Effective Dates     Ky Zuleta PTA 12/13/16 -                 IP PT Goals       01/18/18 1602          Bed Mobility PT LTG    Bed Mobility PT LTG, Date Established 01/18/18  -MA      Bed Mobility PT LTG, Time to Achieve 1 wk  -MA      Bed Mobility PT  LTG, Activity Type all bed mobility  -MA      Bed Mobility PT LTG, [place] Level supervision required  -MA      Transfer Training PT LTG    Transfer Training PT LTG, Date Established 01/18/18  -MA      Transfer Training PT LTG, Time to Achieve 1 wk  -MA      Transfer Training PT LTG, Activity Type all transfers  -MA      Transfer Training PT LTG, [place] Level supervision required  -MA      Transfer Training PT LTG, Assist Device walker, rolling  -MA      Gait Training PT LTG    Gait Training Goal PT LTG, Date Established 01/18/18  -MA      Gait Training Goal PT LTG, Time to Achieve 1 wk  -MA      Gait Training Goal PT LTG, [place] Level supervision required  -MA      Gait Training Goal PT LTG, Assist Device walker, rolling  -MA      Gait Training Goal PT LTG, Distance to Achieve 150  -MA      Stair Training PT LTG    Stair Training Goal PT LTG, Date Established 01/18/18  -MA      Stair Training Goal PT LTG, Time to Achieve 1 wk  -MA      Stair Training Goal PT LTG, Number of Steps 2  -MA      Stair Training Goal PT LTG, [place] Level contact guard assist  -MA      Stair Training Goal PT LTG, Assist Device 1 handrail  -MA      Range of Motion PT LTG    Range of Motion Goal PT LTG, Date Established 01/18/18  -MA      Range of Motion Goal PT LTG, Time to Achieve 1 wk  -MA      Range fo Motion Goal PT LTG, Joint L knee  -MA      Range of Motion Goal PT LTG, AROM Measure 5-90'  -MA        User Key  (r) = Recorded By, (t) = Taken By, (c) = Cosigned By    Initials Name Provider Type    JUNIOR Son PT Physical Therapist          Physical Therapy Education     Title: PT OT SLP Therapies (Done)     Topic: Physical Therapy (Done)     Point: Mobility training (Done)    Learning Progress Summary    Learner Readiness Method Response Comment Documented by Status   Patient Acceptance E,TB TRAV,RASHMI  CW 01/19/18 1203 Done    Acceptance E RASHMI PACHECO 01/18/18 1547 Done               Point: Home exercise program  (Done)    Learning Progress Summary    Learner Readiness Method Response Comment Documented by Status   Patient Acceptance E,TB DU,VU  CW 01/19/18 1203 Done    Acceptance E VU  MA 01/18/18 1547 Done               Point: Body mechanics (Done)    Learning Progress Summary    Learner Readiness Method Response Comment Documented by Status   Patient Acceptance E,TB DU,VU  CW 01/19/18 1203 Done    Acceptance E VU  MA 01/18/18 1547 Done               Point: Precautions (Done)    Learning Progress Summary    Learner Readiness Method Response Comment Documented by Status   Patient Acceptance E,TB DU,VU   01/19/18 1203 Done    Acceptance E VU  MA 01/18/18 1547 Done                      User Key     Initials Effective Dates Name Provider Type Discipline    MA 12/13/16 -  Judith Son, PT Physical Therapist PT     12/13/16 -  Ky Zuleta, PTA Physical Therapy Assistant PT                    PT Recommendation and Plan  Anticipated Discharge Disposition: home with assist, home with home health  Planned Therapy Interventions: balance training, bed mobility training, gait training, home exercise program, patient/family education, postural re-education, ROM (Range of Motion), stair training, strengthening, transfer training  PT Frequency: 2 times/day  Plan of Care Review  Plan Of Care Reviewed With: patient  Progress: progress toward functional goals as expected  Outcome Summary/Follow up Plan: Pt increased with stair navigation and amb safety with RWX as well as strength and ROM in the L LE          Outcome Measures       01/19/18 1200 01/18/18 1500       How much help from another person do you currently need...    Turning from your back to your side while in flat bed without using bedrails? 4  -CW 4  -MA     Moving from lying on back to sitting on the side of a flat bed without bedrails? 4  -CW 4  -MA     Moving to and from a bed to a chair (including a wheelchair)? 4  -CW 3  -MA     Standing up from a chair  using your arms (e.g., wheelchair, bedside chair)? 4  -CW 3  -MA     Climbing 3-5 steps with a railing? 3  -CW 3  -MA     To walk in hospital room? 3  -CW 3  -MA     AM-PAC 6 Clicks Score 22  -CW 20  -MA     Functional Assessment    Outcome Measure Options AM-PAC 6 Clicks Basic Mobility (PT)  -CW AM-PAC 6 Clicks Basic Mobility (PT)  -MA       User Key  (r) = Recorded By, (t) = Taken By, (c) = Cosigned By    Initials Name Provider Type    JUNIOR Son, PT Physical Therapist    CW Ky Zuleta PTA Physical Therapy Assistant           Time Calculation:         PT Charges       01/19/18 1205          Time Calculation    Start Time 0935  -CW      Stop Time 1015  -CW      Time Calculation (min) 40 min  -CW      PT Received On 01/19/18  -CW        User Key  (r) = Recorded By, (t) = Taken By, (c) = Cosigned By    Initials Name Provider Type    JOHNATHAN Zuleta PTA Physical Therapy Assistant          Therapy Charges for Today     Code Description Service Date Service Provider Modifiers Qty    80607749896 HC PT THER PROC GROUP 1/19/2018 Ky Zuleta PTA GP 1    27790056944 HC PT THER PROC EA 15 MIN 1/19/2018 Ky Zuleta PTA GP 1          PT G-Codes  Outcome Measure Options: AM-PAC 6 Clicks Basic Mobility (PT)    Ky Zuleta PTA  1/19/2018

## 2018-03-14 ENCOUNTER — TRANSCRIBE ORDERS (OUTPATIENT)
Dept: ADMINISTRATIVE | Facility: HOSPITAL | Age: 58
End: 2018-03-14

## 2018-03-14 DIAGNOSIS — M79.89 PAIN AND SWELLING OF LEFT LOWER EXTREMITY: Primary | ICD-10-CM

## 2018-03-14 DIAGNOSIS — M79.605 PAIN AND SWELLING OF LEFT LOWER EXTREMITY: Primary | ICD-10-CM

## 2018-03-15 ENCOUNTER — HOSPITAL ENCOUNTER (OUTPATIENT)
Dept: CARDIOLOGY | Facility: HOSPITAL | Age: 58
Discharge: HOME | End: 2018-03-15
Attending: ORTHOPAEDIC SURGERY | Admitting: ORTHOPAEDIC SURGERY

## 2018-03-15 DIAGNOSIS — M79.605 PAIN AND SWELLING OF LEFT LOWER EXTREMITY: ICD-10-CM

## 2018-03-15 DIAGNOSIS — M79.89 PAIN AND SWELLING OF LEFT LOWER EXTREMITY: ICD-10-CM

## 2018-03-15 LAB
BH CV LOWER VASCULAR LEFT COMMON FEMORAL AUGMENT: NORMAL
BH CV LOWER VASCULAR LEFT COMMON FEMORAL COMPETENT: NORMAL
BH CV LOWER VASCULAR LEFT COMMON FEMORAL COMPRESS: NORMAL
BH CV LOWER VASCULAR LEFT COMMON FEMORAL PHASIC: NORMAL
BH CV LOWER VASCULAR LEFT COMMON FEMORAL SPONT: NORMAL
BH CV LOWER VASCULAR LEFT DISTAL FEMORAL COMPRESS: NORMAL
BH CV LOWER VASCULAR LEFT GASTRONEMIUS COMPRESS: NORMAL
BH CV LOWER VASCULAR LEFT GREATER SAPH AK COMPRESS: NORMAL
BH CV LOWER VASCULAR LEFT GREATER SAPH BK COMPRESS: NORMAL
BH CV LOWER VASCULAR LEFT LESSER SAPH COMPRESS: NORMAL
BH CV LOWER VASCULAR LEFT MID FEMORAL AUGMENT: NORMAL
BH CV LOWER VASCULAR LEFT MID FEMORAL COMPETENT: NORMAL
BH CV LOWER VASCULAR LEFT MID FEMORAL COMPRESS: NORMAL
BH CV LOWER VASCULAR LEFT MID FEMORAL PHASIC: NORMAL
BH CV LOWER VASCULAR LEFT MID FEMORAL SPONT: NORMAL
BH CV LOWER VASCULAR LEFT PERONEAL COMPRESS: NORMAL
BH CV LOWER VASCULAR LEFT POPLITEAL AUGMENT: NORMAL
BH CV LOWER VASCULAR LEFT POPLITEAL COMPETENT: NORMAL
BH CV LOWER VASCULAR LEFT POPLITEAL COMPRESS: NORMAL
BH CV LOWER VASCULAR LEFT POPLITEAL PHASIC: NORMAL
BH CV LOWER VASCULAR LEFT POPLITEAL SPONT: NORMAL
BH CV LOWER VASCULAR LEFT POSTERIOR TIBIAL COMPRESS: NORMAL
BH CV LOWER VASCULAR LEFT PROXIMAL FEMORAL COMPRESS: NORMAL
BH CV LOWER VASCULAR LEFT SAPHENOFEMORAL JUNCTION AUGMENT: NORMAL
BH CV LOWER VASCULAR LEFT SAPHENOFEMORAL JUNCTION COMPETENT: NORMAL
BH CV LOWER VASCULAR LEFT SAPHENOFEMORAL JUNCTION COMPRESS: NORMAL
BH CV LOWER VASCULAR LEFT SAPHENOFEMORAL JUNCTION PHASIC: NORMAL
BH CV LOWER VASCULAR LEFT SAPHENOFEMORAL JUNCTION SPONT: NORMAL
BH CV LOWER VASCULAR RIGHT COMMON FEMORAL AUGMENT: NORMAL
BH CV LOWER VASCULAR RIGHT COMMON FEMORAL COMPETENT: NORMAL
BH CV LOWER VASCULAR RIGHT COMMON FEMORAL COMPRESS: NORMAL
BH CV LOWER VASCULAR RIGHT COMMON FEMORAL PHASIC: NORMAL
BH CV LOWER VASCULAR RIGHT COMMON FEMORAL SPONT: NORMAL

## 2018-03-15 PROCEDURE — 93971 EXTREMITY STUDY: CPT

## 2021-11-08 ENCOUNTER — OFFICE VISIT (OUTPATIENT)
Dept: ORTHOPEDIC SURGERY | Facility: CLINIC | Age: 61
End: 2021-11-08

## 2021-11-08 VITALS — BODY MASS INDEX: 32.8 KG/M2 | HEART RATE: 69 BPM | WEIGHT: 209 LBS | OXYGEN SATURATION: 97 % | HEIGHT: 67 IN

## 2021-11-08 DIAGNOSIS — M17.11 PRIMARY OSTEOARTHRITIS OF RIGHT KNEE: Primary | ICD-10-CM

## 2021-11-08 PROCEDURE — 99203 OFFICE O/P NEW LOW 30 MIN: CPT | Performed by: STUDENT IN AN ORGANIZED HEALTH CARE EDUCATION/TRAINING PROGRAM

## 2021-11-08 RX ORDER — MONTELUKAST SODIUM 10 MG/1
10 TABLET ORAL DAILY
COMMUNITY
Start: 2021-08-21

## 2021-11-08 RX ORDER — HYDROCHLOROTHIAZIDE 12.5 MG/1
12.5 TABLET ORAL DAILY
Status: ON HOLD | COMMUNITY
Start: 2021-08-21 | End: 2021-12-23

## 2021-11-08 RX ORDER — MELOXICAM 15 MG/1
15 TABLET ORAL DAILY
Status: ON HOLD | COMMUNITY
Start: 2021-08-21 | End: 2021-12-23

## 2021-11-08 NOTE — PROGRESS NOTES
"Chief Complaint  Pain of the Right Knee    Subjective          Christiano Barr presents to [facility] for evaluation of right knee pain. He reports pain off and on for years but has been worse over the last week. He reports the pain is over the anterior knee. Denies a fall or injury. He underwent a left knee replacement years go in [place]. Last Tuesday he underwent a right knee steroid injection with his PCP without relief.  He underwent xray's recently revealing osteoarthritis, severe, patellofemoral. He has been taking Meloxicam for years.     History of Present Illness      No Known Allergies     Social History     Socioeconomic History   • Marital status:    Tobacco Use   • Smoking status: Never Smoker   • Smokeless tobacco: Never Used   Substance and Sexual Activity   • Alcohol use: Yes     Alcohol/week: 8.0 standard drinks     Types: 8 Cans of beer per week   • Drug use: No   • Sexual activity: Defer        I reviewed the patient's chief complaint, history of present illness, review of systems, past medical history, surgical history, family history, social history, medications, and allergy list.     REVIEW OF SYSTEMS    Constitutional: Denies fevers, chills, weight loss  Cardiovascular: Denies chest pain, shortness of breath  Skin: Denies rashes, acute skin changes  Neurologic: Denies headache, loss of consciousness  MSK: right knee      Objective   Vital Signs:   Pulse 69   Ht 170.2 cm (67\")   Wt 94.8 kg (209 lb)   SpO2 97%   BMI 32.73 kg/m²     Body mass index is 32.73 kg/m².    Physical Exam    Right knee: skin intact, small effusion, varus alignment, -5 of full extension, flexion 100 degrees, tenderness over the anterior knee, no skin discoloration, palpable pedal pulses , intact ankle PF and DF, intact extensor mechanism, no pain with hip range of motion, intact sensation over the foot    Procedures    Imaging Results (Most Recent)     None              "      Assessment and Plan    Diagnoses and all orders for this visit:    1. Primary osteoarthritis of right knee (Primary)        Call or return if symptoms worsen or patient has any concerns.         Scribed for Marshall Kaur MD by Belem Cain  11/08/2021   14:48 EST         Follow Up   Return for injection.  Patient was given instructions and counseling regarding his condition or for health maintenance advice. Please see specific information pulled into the AVS if appropriate.     He is interested in staying the course conservatively at this time, we will seek approval of a gel injection. He may use a brace or sleeve over the knee as needed. He will continue with the meloxicam and has a new brace, given today. He will also do home exercises. Follow-up for Synvisc injection.     I have personally performed the services described in this document as scribed by the above individual and it is both accurate and complete.     Marshall Kaur MD  11/08/21  14:58 EST

## 2021-11-15 ENCOUNTER — TELEPHONE (OUTPATIENT)
Dept: ORTHOPEDIC SURGERY | Facility: CLINIC | Age: 61
End: 2021-11-15

## 2021-11-15 NOTE — TELEPHONE ENCOUNTER
Caller: JULIET Guan call back number: [phone]    Chief complaint: R KNEE    Type of visit:  INJECTION    Requested date: ASAP      Additional notes: PT CALLED IN TO SCHEDULE FIRST INJECTION

## 2021-11-16 ENCOUNTER — TELEPHONE (OUTPATIENT)
Dept: ORTHOPEDIC SURGERY | Facility: CLINIC | Age: 61
End: 2021-11-16

## 2021-11-16 NOTE — TELEPHONE ENCOUNTER
Called pt and he explained that he wants Gel inj and I told him that we need to get PA thru insurance and he said that we don't he received a letter from insurance. Please advise

## 2021-11-16 NOTE — TELEPHONE ENCOUNTER
I SUBMITTED FOR AUTH ON FRIDAY 11/12/21 VIA FAX TO [employer] ([employer]) AS OF 9:00 AM THIS MORNING WE HAVE NOT RECEIVED A RESPONSE FROM INSURANCE. AS SOON AS WE RECEIVE A RESPONSE I WILL NOTIFY PATIENT AND GET HIM SCHEDULED.

## 2021-11-16 NOTE — TELEPHONE ENCOUNTER
LATE ENTRY:  ON Friday 11/12/21 @ 1300 PA FORM WITH CLINICAL NOTES FAXED TO [facility] FOR AUTHORIZATION FOR RIGHT KNEE SYNVISC ONE INJECTION. RECEIVED FAX CONFIRMATION.

## 2021-12-01 ENCOUNTER — OFFICE VISIT (OUTPATIENT)
Dept: ORTHOPEDIC SURGERY | Facility: CLINIC | Age: 61
End: 2021-12-01

## 2021-12-01 VITALS — OXYGEN SATURATION: 96 % | HEART RATE: 68 BPM | BODY MASS INDEX: 35.47 KG/M2 | HEIGHT: 67 IN | WEIGHT: 226 LBS

## 2021-12-01 DIAGNOSIS — M17.11 PRIMARY OSTEOARTHRITIS OF RIGHT KNEE: Primary | ICD-10-CM

## 2021-12-01 PROCEDURE — 20610 DRAIN/INJ JOINT/BURSA W/O US: CPT | Performed by: STUDENT IN AN ORGANIZED HEALTH CARE EDUCATION/TRAINING PROGRAM

## 2021-12-01 RX ORDER — LOSARTAN POTASSIUM AND HYDROCHLOROTHIAZIDE 25; 100 MG/1; MG/1
1 TABLET ORAL DAILY
COMMUNITY
Start: 2021-11-14

## 2021-12-01 RX ORDER — METOPROLOL SUCCINATE 100 MG/1
100 TABLET, EXTENDED RELEASE ORAL DAILY
Status: ON HOLD | COMMUNITY
Start: 2021-11-14 | End: 2021-12-23

## 2021-12-01 NOTE — PROGRESS NOTES
"Chief Complaint  Follow-up of the Right Knee    Subjective          Christiano Barr presents to [facility] for   History of Present Illness    The patient presents here today for follow up evaluation of the right knee. The patient has been treating his Right knee osteoarthritis conservatively with injections. He states the injections provide him with relief. He is here today for a Synvisc injection in the Right knee.   No Known Allergies     Social History     Socioeconomic History   • Marital status:    Tobacco Use   • Smoking status: Never Smoker   • Smokeless tobacco: Never Used   Substance and Sexual Activity   • Alcohol use: Yes     Alcohol/week: 8.0 standard drinks     Types: 8 Cans of beer per week   • Drug use: No   • Sexual activity: Defer        I reviewed the patient's chief complaint, history of present illness, review of systems, past medical history, surgical history, family history, social history, medications, and allergy list.     REVIEW OF SYSTEMS    Constitutional: Denies fevers, chills, weight loss  Cardiovascular: Denies chest pain, shortness of breath  Skin: Denies rashes, acute skin changes  Neurologic: Denies headache, loss of consciousness  MSK: Right knee pain      Objective   Vital Signs:   Pulse 68   Ht 170.2 cm (67\")   Wt 103 kg (226 lb)   SpO2 96%   BMI 35.40 kg/m²     Body mass index is 35.4 kg/m².    Physical Exam       Right knee: skin intact, small effusion, varus alignment, -5 of full extension, flexion 100 degrees, tenderness over the anterior knee, no skin discoloration, palpable pedal pulses , intact ankle PF and DF, intact extensor mechanism, no pain with hip range of motion, intact sensation over the foot    Large Joint Arthrocentesis: R knee  Date/Time: 12/1/2021 9:28 AM  Consent given by: patient  Site marked: site marked  Timeout: Immediately prior to procedure a time out was called to verify the correct patient, procedure, " equipment, support staff and site/side marked as required   Supporting Documentation  Indications: pain   Procedure Details  Location: knee - R knee  Needle gauge: 21 G.  Medications administered: 48 mg hylan 48 MG/6ML  Patient tolerance: patient tolerated the procedure well with no immediate complications          Imaging Results (Most Recent)     None                   Assessment and Plan    Diagnoses and all orders for this visit:    1. Primary osteoarthritis of right knee (Primary)        Discussed the treatment plan with the patient.  Discussed the risks and benefits of a Right knee Synvisc injection. The patient expressed understanding and wished to proceed. He tolerated the injection well.      Call or return if symptoms worsen or patient has any concerns.       Scribed for Marshall Kaur MD by Radha Murillo  12/01/2021   09:18 EST         Follow Up {Instructions Charge Capture  Follow-up Communications :23}  Return if symptoms worsen or fail to improve.  Patient was given instructions and counseling regarding his condition or for health maintenance advice. Please see specific information pulled into the AVS if appropriate.       I have personally performed the services described in this document as scribed by the above individual and it is both accurate and complete.     Marshall Kaur MD  12/01/21  09:25 EST         no

## 2021-12-22 ENCOUNTER — HOSPITAL ENCOUNTER (INPATIENT)
Facility: HOSPITAL | Age: 61
LOS: 3 days | Discharge: HOME | End: 2021-12-27
Attending: EMERGENCY MEDICINE | Admitting: STUDENT IN AN ORGANIZED HEALTH CARE EDUCATION/TRAINING PROGRAM

## 2021-12-22 DIAGNOSIS — R52 PAIN: ICD-10-CM

## 2021-12-22 DIAGNOSIS — R52 INTRACTABLE PAIN: ICD-10-CM
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
## 2021-12-22 DIAGNOSIS — R29.898 WEAKNESS OF RIGHT LOWER EXTREMITY: ICD-10-CM

## 2021-12-22 DIAGNOSIS — M54.41 ACUTE MIDLINE LOW BACK PAIN WITH RIGHT-SIDED SCIATICA: Primary | ICD-10-CM

## 2021-12-22 DIAGNOSIS — D17.79 EPIDURAL LIPOMATOSIS: ICD-10-CM

## 2021-12-22 LAB
ALBUMIN SERPL-MCNC: 4.5 G/DL (ref 3.5–5.2)
ALBUMIN/GLOB SERPL: 1.6 G/DL
ALP SERPL-CCNC: 61 U/L (ref 39–117)
ALT SERPL W P-5'-P-CCNC: 38 U/L (ref 1–41)
ANION GAP SERPL CALCULATED.3IONS-SCNC: 12.5 MMOL/L (ref 5–15)
AST SERPL-CCNC: 20 U/L (ref 1–40)
BASOPHILS # BLD AUTO: 0.03 10*3/MM3 (ref 0–0.2)
BASOPHILS NFR BLD AUTO: 0.3 % (ref 0–1.5)
BILIRUB SERPL-MCNC: 0.3 MG/DL (ref 0–1.2)
BUN SERPL-MCNC: 23 MG/DL (ref 8–23)
BUN/CREAT SERPL: 19.2 (ref 7–25)
CALCIUM SPEC-SCNC: 9.5 MG/DL (ref 8.6–10.5)
CHLORIDE SERPL-SCNC: 105 MMOL/L (ref 98–107)
CO2 SERPL-SCNC: 24.5 MMOL/L (ref 22–29)
CREAT SERPL-MCNC: 1.2 MG/DL (ref 0.76–1.27)
DEPRECATED RDW RBC AUTO: 42.6 FL (ref 37–54)
EOSINOPHIL # BLD AUTO: 0.02 10*3/MM3 (ref 0–0.4)
EOSINOPHIL NFR BLD AUTO: 0.2 % (ref 0.3–6.2)
ERYTHROCYTE [DISTWIDTH] IN BLOOD BY AUTOMATED COUNT: 12.7 % (ref 12.3–15.4)
GFR SERPL CREATININE-BSD FRML MDRD: 62 ML/MIN/1.73
GLOBULIN UR ELPH-MCNC: 2.8 GM/DL
GLUCOSE SERPL-MCNC: 116 MG/DL (ref 65–99)
HCT VFR BLD AUTO: 45.1 % (ref 37.5–51)
HGB BLD-MCNC: 16 G/DL (ref 13–17.7)
IMM GRANULOCYTES # BLD AUTO: 0.2 10*3/MM3 (ref 0–0.05)
IMM GRANULOCYTES NFR BLD AUTO: 1.7 % (ref 0–0.5)
LYMPHOCYTES # BLD AUTO: 2.1 10*3/MM3 (ref 0.7–3.1)
LYMPHOCYTES NFR BLD AUTO: 18.1 % (ref 19.6–45.3)
MCH RBC QN AUTO: 33 PG (ref 26.6–33)
MCHC RBC AUTO-ENTMCNC: 35.5 G/DL (ref 31.5–35.7)
MCV RBC AUTO: 93 FL (ref 79–97)
MONOCYTES # BLD AUTO: 0.93 10*3/MM3 (ref 0.1–0.9)
MONOCYTES NFR BLD AUTO: 8 % (ref 5–12)
NEUTROPHILS NFR BLD AUTO: 71.7 % (ref 42.7–76)
NEUTROPHILS NFR BLD AUTO: 8.31 10*3/MM3 (ref 1.7–7)
NRBC BLD AUTO-RTO: 0 /100 WBC (ref 0–0.2)
PLATELET # BLD AUTO: 230 10*3/MM3 (ref 140–450)
PMV BLD AUTO: 9.1 FL (ref 6–12)
POTASSIUM SERPL-SCNC: 4.3 MMOL/L (ref 3.5–5.2)
PROT SERPL-MCNC: 7.3 G/DL (ref 6–8.5)
RBC # BLD AUTO: 4.85 10*6/MM3 (ref 4.14–5.8)
SARS-COV-2 RNA PNL SPEC NAA+PROBE: NOT DETECTED
SODIUM SERPL-SCNC: 142 MMOL/L (ref 136–145)
WBC NRBC COR # BLD: 11.59 10*3/MM3 (ref 3.4–10.8)

## 2021-12-22 PROCEDURE — 36415 COLL VENOUS BLD VENIPUNCTURE: CPT

## 2021-12-22 PROCEDURE — 87635 SARS-COV-2 COVID-19 AMP PRB: CPT | Performed by: PHYSICIAN ASSISTANT

## 2021-12-22 PROCEDURE — 25010000002 METHYLPREDNISOLONE PER 125 MG: Performed by: PHYSICIAN ASSISTANT

## 2021-12-22 PROCEDURE — 25010000002 HYDROMORPHONE 1 MG/ML SOLUTION: Performed by: EMERGENCY MEDICINE

## 2021-12-22 PROCEDURE — 80053 COMPREHEN METABOLIC PANEL: CPT | Performed by: EMERGENCY MEDICINE

## 2021-12-22 PROCEDURE — 85025 COMPLETE CBC W/AUTO DIFF WBC: CPT | Performed by: EMERGENCY MEDICINE

## 2021-12-22 PROCEDURE — 99284 EMERGENCY DEPT VISIT MOD MDM: CPT

## 2021-12-22 PROCEDURE — 25010000002 ONDANSETRON PER 1 MG: Performed by: EMERGENCY MEDICINE

## 2021-12-22 RX ORDER — ONDANSETRON 2 MG/ML
4 INJECTION INTRAMUSCULAR; INTRAVENOUS ONCE
Status: COMPLETED | OUTPATIENT
Start: 2021-12-22 | End: 2021-12-22

## 2021-12-22 RX ORDER — METHYLPREDNISOLONE SODIUM SUCCINATE 125 MG/2ML
125 INJECTION, POWDER, LYOPHILIZED, FOR SOLUTION INTRAMUSCULAR; INTRAVENOUS ONCE
Status: COMPLETED | OUTPATIENT
Start: 2021-12-22 | End: 2021-12-22

## 2021-12-22 RX ADMIN — ONDANSETRON 4 MG: 2 INJECTION INTRAMUSCULAR; INTRAVENOUS at 23:20

## 2021-12-22 RX ADMIN — HYDROMORPHONE HYDROCHLORIDE 1 MG: 1 INJECTION, SOLUTION INTRAMUSCULAR; INTRAVENOUS; SUBCUTANEOUS at 23:20

## 2021-12-22 RX ADMIN — METHYLPREDNISOLONE SODIUM SUCCINATE 125 MG: 125 INJECTION, POWDER, FOR SOLUTION INTRAMUSCULAR; INTRAVENOUS at 23:20

## 2021-12-23 ENCOUNTER — APPOINTMENT (OUTPATIENT)
Dept: MRI IMAGING | Facility: HOSPITAL | Age: 61
End: 2021-12-23

## 2021-12-23 PROBLEM — I10 HTN (HYPERTENSION): Status: ACTIVE | Noted: 2021-12-23

## 2021-12-23 PROBLEM — M54.41 ACUTE MIDLINE LOW BACK PAIN WITH RIGHT-SIDED SCIATICA: Status: ACTIVE | Noted: 2021-12-23

## 2021-12-23 PROBLEM — K21.9 GERD WITHOUT ESOPHAGITIS: Status: ACTIVE | Noted: 2021-12-23

## 2021-12-23 PROBLEM — D17.79 EPIDURAL LIPOMATOSIS: Status: ACTIVE | Noted: 2021-12-23

## 2021-12-23 PROBLEM — R20.2 NUMBNESS AND TINGLING OF RIGHT LEG: Status: ACTIVE | Noted: 2021-12-23

## 2021-12-23 PROBLEM — E66.9 OBESITY (BMI 30-39.9): Status: ACTIVE | Noted: 2021-12-23

## 2021-12-23 PROBLEM — R20.0 NUMBNESS AND TINGLING OF RIGHT LEG: Status: ACTIVE | Noted: 2021-12-23

## 2021-12-23 PROBLEM — E88.2 EPIDURAL LIPOMATOSIS: Status: ACTIVE | Noted: 2021-12-23

## 2021-12-23 PROBLEM — M48.061 SPINAL STENOSIS OF LUMBAR REGION WITH RADICULOPATHY: Status: ACTIVE | Noted: 2021-12-23

## 2021-12-23 PROBLEM — M54.16 SPINAL STENOSIS OF LUMBAR REGION WITH RADICULOPATHY: Status: ACTIVE | Noted: 2021-12-23

## 2021-12-23 LAB
ANION GAP SERPL CALCULATED.3IONS-SCNC: 14.5 MMOL/L (ref 5–15)
BUN SERPL-MCNC: 26 MG/DL (ref 8–23)
BUN/CREAT SERPL: 26.3 (ref 7–25)
CALCIUM SPEC-SCNC: 9.3 MG/DL (ref 8.6–10.5)
CHLORIDE SERPL-SCNC: 101 MMOL/L (ref 98–107)
CO2 SERPL-SCNC: 17.5 MMOL/L (ref 22–29)
CREAT SERPL-MCNC: 0.99 MG/DL (ref 0.76–1.27)
DEPRECATED RDW RBC AUTO: 43.4 FL (ref 37–54)
ERYTHROCYTE [DISTWIDTH] IN BLOOD BY AUTOMATED COUNT: 12.8 % (ref 12.3–15.4)
GFR SERPL CREATININE-BSD FRML MDRD: 77 ML/MIN/1.73
GLUCOSE SERPL-MCNC: 162 MG/DL (ref 65–99)
HCT VFR BLD AUTO: 47.1 % (ref 37.5–51)
HGB BLD-MCNC: 16.1 G/DL (ref 13–17.7)
MCH RBC QN AUTO: 32.2 PG (ref 26.6–33)
MCHC RBC AUTO-ENTMCNC: 34.2 G/DL (ref 31.5–35.7)
MCV RBC AUTO: 94.2 FL (ref 79–97)
PLATELET # BLD AUTO: 224 10*3/MM3 (ref 140–450)
PMV BLD AUTO: 9.4 FL (ref 6–12)
POTASSIUM SERPL-SCNC: 4.5 MMOL/L (ref 3.5–5.2)
RBC # BLD AUTO: 5 10*6/MM3 (ref 4.14–5.8)
SODIUM SERPL-SCNC: 133 MMOL/L (ref 136–145)
WBC NRBC COR # BLD: 10.4 10*3/MM3 (ref 3.4–10.8)

## 2021-12-23 PROCEDURE — 25010000002 DEXAMETHASONE PER 1 MG: Performed by: NURSE PRACTITIONER

## 2021-12-23 PROCEDURE — G0378 HOSPITAL OBSERVATION PER HR: HCPCS

## 2021-12-23 PROCEDURE — 85027 COMPLETE CBC AUTOMATED: CPT

## 2021-12-23 PROCEDURE — 36415 COLL VENOUS BLD VENIPUNCTURE: CPT

## 2021-12-23 PROCEDURE — 25010000002 FENTANYL CITRATE (PF) 50 MCG/ML SOLUTION: Performed by: EMERGENCY MEDICINE

## 2021-12-23 PROCEDURE — 72148 MRI LUMBAR SPINE W/O DYE: CPT

## 2021-12-23 PROCEDURE — 25010000002 HYDROMORPHONE PER 4 MG: Performed by: STUDENT IN AN ORGANIZED HEALTH CARE EDUCATION/TRAINING PROGRAM

## 2021-12-23 PROCEDURE — 80048 BASIC METABOLIC PNL TOTAL CA: CPT

## 2021-12-23 PROCEDURE — 25010000002 METHYLPREDNISOLONE PER 125 MG: Performed by: NURSE PRACTITIONER

## 2021-12-23 PROCEDURE — 25010000002 HYDROMORPHONE PER 4 MG: Performed by: HOSPITALIST

## 2021-12-23 PROCEDURE — 99204 OFFICE O/P NEW MOD 45 MIN: CPT | Performed by: NURSE PRACTITIONER

## 2021-12-23 RX ORDER — HYDROCODONE BITARTRATE AND ACETAMINOPHEN 7.5; 325 MG/1; MG/1
1 TABLET ORAL EVERY 4 HOURS PRN
Status: DISCONTINUED | OUTPATIENT
Start: 2021-12-23 | End: 2021-12-27 | Stop reason: HOSPADM

## 2021-12-23 RX ORDER — ONDANSETRON 4 MG/1
4 TABLET, FILM COATED ORAL EVERY 6 HOURS PRN
Status: DISCONTINUED | OUTPATIENT
Start: 2021-12-23 | End: 2021-12-27 | Stop reason: HOSPADM

## 2021-12-23 RX ORDER — DEXAMETHASONE SODIUM PHOSPHATE 4 MG/ML
4 INJECTION, SOLUTION INTRA-ARTICULAR; INTRALESIONAL; INTRAMUSCULAR; INTRAVENOUS; SOFT TISSUE EVERY 6 HOURS
Status: DISCONTINUED | OUTPATIENT
Start: 2021-12-23 | End: 2021-12-27 | Stop reason: HOSPADM

## 2021-12-23 RX ORDER — HYDROMORPHONE HYDROCHLORIDE 1 MG/ML
0.5 INJECTION, SOLUTION INTRAMUSCULAR; INTRAVENOUS; SUBCUTANEOUS
Status: DISCONTINUED | OUTPATIENT
Start: 2021-12-23 | End: 2021-12-23

## 2021-12-23 RX ORDER — MONTELUKAST SODIUM 10 MG/1
10 TABLET ORAL DAILY
Status: DISCONTINUED | OUTPATIENT
Start: 2021-12-23 | End: 2021-12-27 | Stop reason: HOSPADM

## 2021-12-23 RX ORDER — METHYLPREDNISOLONE SODIUM SUCCINATE 125 MG/2ML
80 INJECTION, POWDER, LYOPHILIZED, FOR SOLUTION INTRAMUSCULAR; INTRAVENOUS EVERY 12 HOURS
Status: DISCONTINUED | OUTPATIENT
Start: 2021-12-23 | End: 2021-12-23

## 2021-12-23 RX ORDER — PANTOPRAZOLE SODIUM 40 MG/1
40 TABLET, DELAYED RELEASE ORAL DAILY
COMMUNITY
Start: 2021-11-14

## 2021-12-23 RX ORDER — FENTANYL CITRATE 50 UG/ML
25 INJECTION, SOLUTION INTRAMUSCULAR; INTRAVENOUS ONCE
Status: COMPLETED | OUTPATIENT
Start: 2021-12-23 | End: 2021-12-23

## 2021-12-23 RX ORDER — ONDANSETRON 2 MG/ML
4 INJECTION INTRAMUSCULAR; INTRAVENOUS EVERY 6 HOURS PRN
Status: DISCONTINUED | OUTPATIENT
Start: 2021-12-23 | End: 2021-12-27 | Stop reason: HOSPADM

## 2021-12-23 RX ORDER — HYDROMORPHONE HYDROCHLORIDE 1 MG/ML
0.5 INJECTION, SOLUTION INTRAMUSCULAR; INTRAVENOUS; SUBCUTANEOUS
Status: DISCONTINUED | OUTPATIENT
Start: 2021-12-23 | End: 2021-12-27 | Stop reason: HOSPADM

## 2021-12-23 RX ORDER — PANTOPRAZOLE SODIUM 40 MG/1
40 TABLET, DELAYED RELEASE ORAL DAILY
Status: DISCONTINUED | OUTPATIENT
Start: 2021-12-23 | End: 2021-12-27 | Stop reason: HOSPADM

## 2021-12-23 RX ORDER — HYDROCODONE BITARTRATE AND ACETAMINOPHEN 5; 325 MG/1; MG/1
1 TABLET ORAL EVERY 6 HOURS PRN
COMMUNITY
End: 2021-12-27 | Stop reason: HOSPADM

## 2021-12-23 RX ORDER — METOPROLOL SUCCINATE 100 MG/1
100 TABLET, EXTENDED RELEASE ORAL
Status: DISCONTINUED | OUTPATIENT
Start: 2021-12-23 | End: 2021-12-24

## 2021-12-23 RX ORDER — UREA 10 %
3 LOTION (ML) TOPICAL NIGHTLY PRN
Status: DISCONTINUED | OUTPATIENT
Start: 2021-12-23 | End: 2021-12-27 | Stop reason: HOSPADM

## 2021-12-23 RX ORDER — ACETAMINOPHEN 325 MG/1
650 TABLET ORAL EVERY 4 HOURS PRN
Status: DISCONTINUED | OUTPATIENT
Start: 2021-12-23 | End: 2021-12-27 | Stop reason: HOSPADM

## 2021-12-23 RX ADMIN — METHYLPREDNISOLONE SODIUM SUCCINATE 80 MG: 125 INJECTION, POWDER, FOR SOLUTION INTRAMUSCULAR; INTRAVENOUS at 13:51

## 2021-12-23 RX ADMIN — HYDROMORPHONE HYDROCHLORIDE 0.5 MG: 1 INJECTION, SOLUTION INTRAMUSCULAR; INTRAVENOUS; SUBCUTANEOUS at 02:49

## 2021-12-23 RX ADMIN — FENTANYL CITRATE 25 MCG: 50 INJECTION INTRAMUSCULAR; INTRAVENOUS at 00:44

## 2021-12-23 RX ADMIN — PANTOPRAZOLE SODIUM 40 MG: 40 TABLET, DELAYED RELEASE ORAL at 13:51

## 2021-12-23 RX ADMIN — HYDROMORPHONE HYDROCHLORIDE 0.5 MG: 1 INJECTION, SOLUTION INTRAMUSCULAR; INTRAVENOUS; SUBCUTANEOUS at 18:57

## 2021-12-23 RX ADMIN — LOSARTAN POTASSIUM: 50 TABLET ORAL at 13:51

## 2021-12-23 RX ADMIN — DEXAMETHASONE SODIUM PHOSPHATE 4 MG: 4 INJECTION, SOLUTION INTRAMUSCULAR; INTRAVENOUS at 20:33

## 2021-12-23 RX ADMIN — HYDROCODONE BITARTRATE AND ACETAMINOPHEN 1 TABLET: 7.5; 325 TABLET ORAL at 16:44

## 2021-12-23 RX ADMIN — MONTELUKAST SODIUM 10 MG: 10 TABLET, FILM COATED ORAL at 13:52

## 2021-12-23 RX ADMIN — METOPROLOL SUCCINATE 100 MG: 100 TABLET, EXTENDED RELEASE ORAL at 13:52

## 2021-12-23 RX ADMIN — HYDROMORPHONE HYDROCHLORIDE 0.5 MG: 1 INJECTION, SOLUTION INTRAMUSCULAR; INTRAVENOUS; SUBCUTANEOUS at 13:52

## 2021-12-24 ENCOUNTER — APPOINTMENT (OUTPATIENT)
Dept: GENERAL RADIOLOGY | Facility: HOSPITAL | Age: 61
End: 2021-12-24

## 2021-12-24 ENCOUNTER — APPOINTMENT (OUTPATIENT)
Dept: CT IMAGING | Facility: HOSPITAL | Age: 61
End: 2021-12-24

## 2021-12-24 PROCEDURE — 25010000002 HYDROMORPHONE PER 4 MG: Performed by: HOSPITALIST

## 2021-12-24 PROCEDURE — 25010000002 DEXAMETHASONE PER 1 MG: Performed by: NURSE PRACTITIONER

## 2021-12-24 PROCEDURE — 72114 X-RAY EXAM L-S SPINE BENDING: CPT

## 2021-12-24 PROCEDURE — 97161 PT EVAL LOW COMPLEX 20 MIN: CPT

## 2021-12-24 PROCEDURE — 72131 CT LUMBAR SPINE W/O DYE: CPT

## 2021-12-24 PROCEDURE — 99232 SBSQ HOSP IP/OBS MODERATE 35: CPT | Performed by: NEUROLOGICAL SURGERY

## 2021-12-24 PROCEDURE — 97110 THERAPEUTIC EXERCISES: CPT

## 2021-12-24 RX ORDER — CYCLOBENZAPRINE HCL 10 MG
10 TABLET ORAL 3 TIMES DAILY
Status: DISCONTINUED | OUTPATIENT
Start: 2021-12-24 | End: 2021-12-27 | Stop reason: HOSPADM

## 2021-12-24 RX ORDER — METOPROLOL SUCCINATE 50 MG/1
50 TABLET, EXTENDED RELEASE ORAL
Status: DISCONTINUED | OUTPATIENT
Start: 2021-12-25 | End: 2021-12-25

## 2021-12-24 RX ORDER — GABAPENTIN 300 MG/1
300 CAPSULE ORAL EVERY 12 HOURS SCHEDULED
Status: DISCONTINUED | OUTPATIENT
Start: 2021-12-24 | End: 2021-12-25

## 2021-12-24 RX ORDER — HYDROMORPHONE HYDROCHLORIDE 1 MG/ML
0.5 INJECTION, SOLUTION INTRAMUSCULAR; INTRAVENOUS; SUBCUTANEOUS ONCE
Status: COMPLETED | OUTPATIENT
Start: 2021-12-24 | End: 2021-12-24

## 2021-12-24 RX ADMIN — CYCLOBENZAPRINE 10 MG: 10 TABLET, FILM COATED ORAL at 09:52

## 2021-12-24 RX ADMIN — DEXAMETHASONE SODIUM PHOSPHATE 4 MG: 4 INJECTION, SOLUTION INTRAMUSCULAR; INTRAVENOUS at 02:58

## 2021-12-24 RX ADMIN — HYDROMORPHONE HYDROCHLORIDE 0.5 MG: 1 INJECTION, SOLUTION INTRAMUSCULAR; INTRAVENOUS; SUBCUTANEOUS at 09:50

## 2021-12-24 RX ADMIN — PANTOPRAZOLE SODIUM 40 MG: 40 TABLET, DELAYED RELEASE ORAL at 08:23

## 2021-12-24 RX ADMIN — HYDROMORPHONE HYDROCHLORIDE 0.5 MG: 1 INJECTION, SOLUTION INTRAMUSCULAR; INTRAVENOUS; SUBCUTANEOUS at 11:26

## 2021-12-24 RX ADMIN — HYDROCODONE BITARTRATE AND ACETAMINOPHEN 1 TABLET: 7.5; 325 TABLET ORAL at 08:23

## 2021-12-24 RX ADMIN — METOPROLOL SUCCINATE 100 MG: 100 TABLET, EXTENDED RELEASE ORAL at 08:23

## 2021-12-24 RX ADMIN — MONTELUKAST SODIUM 10 MG: 10 TABLET, FILM COATED ORAL at 08:23

## 2021-12-24 RX ADMIN — HYDROMORPHONE HYDROCHLORIDE 0.5 MG: 1 INJECTION, SOLUTION INTRAMUSCULAR; INTRAVENOUS; SUBCUTANEOUS at 19:33

## 2021-12-24 RX ADMIN — HYDROMORPHONE HYDROCHLORIDE 0.5 MG: 1 INJECTION, SOLUTION INTRAMUSCULAR; INTRAVENOUS; SUBCUTANEOUS at 16:12

## 2021-12-24 RX ADMIN — DEXAMETHASONE SODIUM PHOSPHATE 4 MG: 4 INJECTION, SOLUTION INTRAMUSCULAR; INTRAVENOUS at 08:24

## 2021-12-24 RX ADMIN — HYDROCODONE BITARTRATE AND ACETAMINOPHEN 1 TABLET: 7.5; 325 TABLET ORAL at 17:30

## 2021-12-24 RX ADMIN — DEXAMETHASONE SODIUM PHOSPHATE 4 MG: 4 INJECTION, SOLUTION INTRAMUSCULAR; INTRAVENOUS at 16:12

## 2021-12-24 RX ADMIN — HYDROCODONE BITARTRATE AND ACETAMINOPHEN 1 TABLET: 7.5; 325 TABLET ORAL at 02:58

## 2021-12-24 RX ADMIN — DEXAMETHASONE SODIUM PHOSPHATE 4 MG: 4 INJECTION, SOLUTION INTRAMUSCULAR; INTRAVENOUS at 20:02

## 2021-12-24 RX ADMIN — GABAPENTIN 300 MG: 300 CAPSULE ORAL at 08:23

## 2021-12-24 RX ADMIN — LOSARTAN POTASSIUM: 50 TABLET ORAL at 08:23

## 2021-12-24 RX ADMIN — GABAPENTIN 300 MG: 300 CAPSULE ORAL at 20:02

## 2021-12-24 RX ADMIN — CYCLOBENZAPRINE 10 MG: 10 TABLET, FILM COATED ORAL at 20:02

## 2021-12-24 RX ADMIN — CYCLOBENZAPRINE 10 MG: 10 TABLET, FILM COATED ORAL at 16:12

## 2021-12-25 LAB
ANION GAP SERPL CALCULATED.3IONS-SCNC: 12.2 MMOL/L (ref 5–15)
BUN SERPL-MCNC: 29 MG/DL (ref 8–23)
BUN/CREAT SERPL: 26.9 (ref 7–25)
CALCIUM SPEC-SCNC: 9 MG/DL (ref 8.6–10.5)
CHLORIDE SERPL-SCNC: 98 MMOL/L (ref 98–107)
CO2 SERPL-SCNC: 22.8 MMOL/L (ref 22–29)
CREAT SERPL-MCNC: 1.08 MG/DL (ref 0.76–1.27)
DEPRECATED RDW RBC AUTO: 43.6 FL (ref 37–54)
ERYTHROCYTE [DISTWIDTH] IN BLOOD BY AUTOMATED COUNT: 12.5 % (ref 12.3–15.4)
GFR SERPL CREATININE-BSD FRML MDRD: 70 ML/MIN/1.73
GLUCOSE SERPL-MCNC: 152 MG/DL (ref 65–99)
HBA1C MFR BLD: 5.8 % (ref 4.8–5.6)
HCT VFR BLD AUTO: 48 % (ref 37.5–51)
HGB BLD-MCNC: 16.4 G/DL (ref 13–17.7)
MCH RBC QN AUTO: 32.5 PG (ref 26.6–33)
MCHC RBC AUTO-ENTMCNC: 34.2 G/DL (ref 31.5–35.7)
MCV RBC AUTO: 95.2 FL (ref 79–97)
PLATELET # BLD AUTO: 240 10*3/MM3 (ref 140–450)
PMV BLD AUTO: 9.3 FL (ref 6–12)
POTASSIUM SERPL-SCNC: 4.2 MMOL/L (ref 3.5–5.2)
RBC # BLD AUTO: 5.04 10*6/MM3 (ref 4.14–5.8)
SODIUM SERPL-SCNC: 133 MMOL/L (ref 136–145)
WBC NRBC COR # BLD: 16.25 10*3/MM3 (ref 3.4–10.8)

## 2021-12-25 PROCEDURE — 83036 HEMOGLOBIN GLYCOSYLATED A1C: CPT | Performed by: STUDENT IN AN ORGANIZED HEALTH CARE EDUCATION/TRAINING PROGRAM

## 2021-12-25 PROCEDURE — 85027 COMPLETE CBC AUTOMATED: CPT | Performed by: STUDENT IN AN ORGANIZED HEALTH CARE EDUCATION/TRAINING PROGRAM

## 2021-12-25 PROCEDURE — 25010000002 HYDROMORPHONE PER 4 MG: Performed by: HOSPITALIST

## 2021-12-25 PROCEDURE — 25010000002 DEXAMETHASONE PER 1 MG: Performed by: NURSE PRACTITIONER

## 2021-12-25 PROCEDURE — 99232 SBSQ HOSP IP/OBS MODERATE 35: CPT | Performed by: NEUROLOGICAL SURGERY

## 2021-12-25 PROCEDURE — 80048 BASIC METABOLIC PNL TOTAL CA: CPT | Performed by: STUDENT IN AN ORGANIZED HEALTH CARE EDUCATION/TRAINING PROGRAM

## 2021-12-25 RX ORDER — GABAPENTIN 300 MG/1
600 CAPSULE ORAL EVERY 12 HOURS SCHEDULED
Status: DISCONTINUED | OUTPATIENT
Start: 2021-12-25 | End: 2021-12-27 | Stop reason: HOSPADM

## 2021-12-25 RX ORDER — METOPROLOL SUCCINATE 25 MG/1
25 TABLET, EXTENDED RELEASE ORAL
Status: DISCONTINUED | OUTPATIENT
Start: 2021-12-26 | End: 2021-12-27 | Stop reason: HOSPADM

## 2021-12-25 RX ADMIN — METOPROLOL SUCCINATE 50 MG: 50 TABLET, EXTENDED RELEASE ORAL at 08:05

## 2021-12-25 RX ADMIN — PANTOPRAZOLE SODIUM 40 MG: 40 TABLET, DELAYED RELEASE ORAL at 08:05

## 2021-12-25 RX ADMIN — CYCLOBENZAPRINE 10 MG: 10 TABLET, FILM COATED ORAL at 15:31

## 2021-12-25 RX ADMIN — CYCLOBENZAPRINE 10 MG: 10 TABLET, FILM COATED ORAL at 20:04

## 2021-12-25 RX ADMIN — MONTELUKAST SODIUM 10 MG: 10 TABLET, FILM COATED ORAL at 08:05

## 2021-12-25 RX ADMIN — DEXAMETHASONE SODIUM PHOSPHATE 4 MG: 4 INJECTION, SOLUTION INTRAMUSCULAR; INTRAVENOUS at 03:00

## 2021-12-25 RX ADMIN — DEXAMETHASONE SODIUM PHOSPHATE 4 MG: 4 INJECTION, SOLUTION INTRAMUSCULAR; INTRAVENOUS at 13:09

## 2021-12-25 RX ADMIN — GABAPENTIN 300 MG: 300 CAPSULE ORAL at 08:05

## 2021-12-25 RX ADMIN — HYDROMORPHONE HYDROCHLORIDE 0.5 MG: 1 INJECTION, SOLUTION INTRAMUSCULAR; INTRAVENOUS; SUBCUTANEOUS at 13:09

## 2021-12-25 RX ADMIN — DEXAMETHASONE SODIUM PHOSPHATE 4 MG: 4 INJECTION, SOLUTION INTRAMUSCULAR; INTRAVENOUS at 08:06

## 2021-12-25 RX ADMIN — HYDROMORPHONE HYDROCHLORIDE 0.5 MG: 1 INJECTION, SOLUTION INTRAMUSCULAR; INTRAVENOUS; SUBCUTANEOUS at 21:10

## 2021-12-25 RX ADMIN — HYDROMORPHONE HYDROCHLORIDE 0.5 MG: 1 INJECTION, SOLUTION INTRAMUSCULAR; INTRAVENOUS; SUBCUTANEOUS at 15:31

## 2021-12-25 RX ADMIN — Medication 3 MG: at 21:10

## 2021-12-25 RX ADMIN — CYCLOBENZAPRINE 10 MG: 10 TABLET, FILM COATED ORAL at 08:05

## 2021-12-25 RX ADMIN — HYDROCODONE BITARTRATE AND ACETAMINOPHEN 1 TABLET: 7.5; 325 TABLET ORAL at 10:39

## 2021-12-25 RX ADMIN — GABAPENTIN 600 MG: 300 CAPSULE ORAL at 20:04

## 2021-12-25 RX ADMIN — DEXAMETHASONE SODIUM PHOSPHATE 4 MG: 4 INJECTION, SOLUTION INTRAMUSCULAR; INTRAVENOUS at 19:57

## 2021-12-25 RX ADMIN — LOSARTAN POTASSIUM: 50 TABLET ORAL at 08:05

## 2021-12-26 PROBLEM — R73.03 PREDIABETES: Status: ACTIVE | Noted: 2021-12-26

## 2021-12-26 LAB
ANION GAP SERPL CALCULATED.3IONS-SCNC: 10.8 MMOL/L (ref 5–15)
BUN SERPL-MCNC: 32 MG/DL (ref 8–23)
BUN/CREAT SERPL: 34.4 (ref 7–25)
CALCIUM SPEC-SCNC: 9 MG/DL (ref 8.6–10.5)
CHLORIDE SERPL-SCNC: 99 MMOL/L (ref 98–107)
CO2 SERPL-SCNC: 22.2 MMOL/L (ref 22–29)
CREAT SERPL-MCNC: 0.93 MG/DL (ref 0.76–1.27)
DEPRECATED RDW RBC AUTO: 44.1 FL (ref 37–54)
ERYTHROCYTE [DISTWIDTH] IN BLOOD BY AUTOMATED COUNT: 12.7 % (ref 12.3–15.4)
GFR SERPL CREATININE-BSD FRML MDRD: 83 ML/MIN/1.73
GLUCOSE BLDC GLUCOMTR-MCNC: 144 MG/DL (ref 70–130)
GLUCOSE BLDC GLUCOMTR-MCNC: 179 MG/DL (ref 70–130)
GLUCOSE BLDC GLUCOMTR-MCNC: 274 MG/DL (ref 70–130)
GLUCOSE SERPL-MCNC: 163 MG/DL (ref 65–99)
HCT VFR BLD AUTO: 47 % (ref 37.5–51)
HGB BLD-MCNC: 16.1 G/DL (ref 13–17.7)
MAGNESIUM SERPL-MCNC: 2.5 MG/DL (ref 1.6–2.4)
MCH RBC QN AUTO: 32.7 PG (ref 26.6–33)
MCHC RBC AUTO-ENTMCNC: 34.3 G/DL (ref 31.5–35.7)
MCV RBC AUTO: 95.3 FL (ref 79–97)
PHOSPHATE SERPL-MCNC: 4.9 MG/DL (ref 2.5–4.5)
PLATELET # BLD AUTO: 230 10*3/MM3 (ref 140–450)
PMV BLD AUTO: 9.3 FL (ref 6–12)
POTASSIUM SERPL-SCNC: 4.4 MMOL/L (ref 3.5–5.2)
PROCALCITONIN SERPL-MCNC: 0.06 NG/ML (ref 0–0.25)
QT INTERVAL: 420 MS
RBC # BLD AUTO: 4.93 10*6/MM3 (ref 4.14–5.8)
SODIUM SERPL-SCNC: 132 MMOL/L (ref 136–145)
TSH SERPL DL<=0.05 MIU/L-ACNC: 0.79 UIU/ML (ref 0.27–4.2)
WBC NRBC COR # BLD: 13.45 10*3/MM3 (ref 3.4–10.8)

## 2021-12-26 PROCEDURE — 25010000002 HYDROMORPHONE PER 4 MG: Performed by: HOSPITALIST

## 2021-12-26 PROCEDURE — 84100 ASSAY OF PHOSPHORUS: CPT | Performed by: STUDENT IN AN ORGANIZED HEALTH CARE EDUCATION/TRAINING PROGRAM

## 2021-12-26 PROCEDURE — 25010000002 DEXAMETHASONE PER 1 MG: Performed by: NURSE PRACTITIONER

## 2021-12-26 PROCEDURE — 84443 ASSAY THYROID STIM HORMONE: CPT | Performed by: STUDENT IN AN ORGANIZED HEALTH CARE EDUCATION/TRAINING PROGRAM

## 2021-12-26 PROCEDURE — 93010 ELECTROCARDIOGRAM REPORT: CPT | Performed by: INTERNAL MEDICINE

## 2021-12-26 PROCEDURE — 84145 PROCALCITONIN (PCT): CPT | Performed by: STUDENT IN AN ORGANIZED HEALTH CARE EDUCATION/TRAINING PROGRAM

## 2021-12-26 PROCEDURE — 93005 ELECTROCARDIOGRAM TRACING: CPT | Performed by: STUDENT IN AN ORGANIZED HEALTH CARE EDUCATION/TRAINING PROGRAM

## 2021-12-26 PROCEDURE — 80048 BASIC METABOLIC PNL TOTAL CA: CPT | Performed by: STUDENT IN AN ORGANIZED HEALTH CARE EDUCATION/TRAINING PROGRAM

## 2021-12-26 PROCEDURE — 63710000001 INSULIN LISPRO (HUMAN) PER 5 UNITS: Performed by: STUDENT IN AN ORGANIZED HEALTH CARE EDUCATION/TRAINING PROGRAM

## 2021-12-26 PROCEDURE — 85027 COMPLETE CBC AUTOMATED: CPT | Performed by: STUDENT IN AN ORGANIZED HEALTH CARE EDUCATION/TRAINING PROGRAM

## 2021-12-26 PROCEDURE — 82962 GLUCOSE BLOOD TEST: CPT

## 2021-12-26 PROCEDURE — 83735 ASSAY OF MAGNESIUM: CPT | Performed by: STUDENT IN AN ORGANIZED HEALTH CARE EDUCATION/TRAINING PROGRAM

## 2021-12-26 PROCEDURE — 99232 SBSQ HOSP IP/OBS MODERATE 35: CPT | Performed by: NEUROLOGICAL SURGERY

## 2021-12-26 RX ORDER — AMOXICILLIN 250 MG
2 CAPSULE ORAL 2 TIMES DAILY
Status: DISCONTINUED | OUTPATIENT
Start: 2021-12-26 | End: 2021-12-27 | Stop reason: HOSPADM

## 2021-12-26 RX ORDER — DEXTROSE MONOHYDRATE 25 G/50ML
25 INJECTION, SOLUTION INTRAVENOUS
Status: DISCONTINUED | OUTPATIENT
Start: 2021-12-26 | End: 2021-12-27 | Stop reason: HOSPADM

## 2021-12-26 RX ORDER — INSULIN LISPRO 100 [IU]/ML
0-7 INJECTION, SOLUTION INTRAVENOUS; SUBCUTANEOUS
Status: DISCONTINUED | OUTPATIENT
Start: 2021-12-26 | End: 2021-12-27 | Stop reason: HOSPADM

## 2021-12-26 RX ORDER — BISACODYL 10 MG
10 SUPPOSITORY, RECTAL RECTAL DAILY PRN
Status: DISCONTINUED | OUTPATIENT
Start: 2021-12-26 | End: 2021-12-27 | Stop reason: HOSPADM

## 2021-12-26 RX ORDER — POLYETHYLENE GLYCOL 3350 17 G/17G
17 POWDER, FOR SOLUTION ORAL DAILY
Status: DISCONTINUED | OUTPATIENT
Start: 2021-12-26 | End: 2021-12-27 | Stop reason: HOSPADM

## 2021-12-26 RX ORDER — NICOTINE POLACRILEX 4 MG
15 LOZENGE BUCCAL
Status: DISCONTINUED | OUTPATIENT
Start: 2021-12-26 | End: 2021-12-27 | Stop reason: HOSPADM

## 2021-12-26 RX ADMIN — MONTELUKAST SODIUM 10 MG: 10 TABLET, FILM COATED ORAL at 08:55

## 2021-12-26 RX ADMIN — CYCLOBENZAPRINE 10 MG: 10 TABLET, FILM COATED ORAL at 08:55

## 2021-12-26 RX ADMIN — HYDROCODONE BITARTRATE AND ACETAMINOPHEN 1 TABLET: 7.5; 325 TABLET ORAL at 17:30

## 2021-12-26 RX ADMIN — CYCLOBENZAPRINE 10 MG: 10 TABLET, FILM COATED ORAL at 15:00

## 2021-12-26 RX ADMIN — INSULIN LISPRO 2 UNITS: 100 INJECTION, SOLUTION INTRAVENOUS; SUBCUTANEOUS at 17:30

## 2021-12-26 RX ADMIN — PANTOPRAZOLE SODIUM 40 MG: 40 TABLET, DELAYED RELEASE ORAL at 08:55

## 2021-12-26 RX ADMIN — CYCLOBENZAPRINE 10 MG: 10 TABLET, FILM COATED ORAL at 21:07

## 2021-12-26 RX ADMIN — HYDROCODONE BITARTRATE AND ACETAMINOPHEN 1 TABLET: 7.5; 325 TABLET ORAL at 21:07

## 2021-12-26 RX ADMIN — HYDROCODONE BITARTRATE AND ACETAMINOPHEN 1 TABLET: 7.5; 325 TABLET ORAL at 11:38

## 2021-12-26 RX ADMIN — DOCUSATE SODIUM 50MG AND SENNOSIDES 8.6MG 2 TABLET: 8.6; 5 TABLET, FILM COATED ORAL at 21:07

## 2021-12-26 RX ADMIN — HYDROCODONE BITARTRATE AND ACETAMINOPHEN 1 TABLET: 7.5; 325 TABLET ORAL at 06:43

## 2021-12-26 RX ADMIN — DEXAMETHASONE SODIUM PHOSPHATE 4 MG: 4 INJECTION, SOLUTION INTRAMUSCULAR; INTRAVENOUS at 15:00

## 2021-12-26 RX ADMIN — Medication 3 MG: at 21:06

## 2021-12-26 RX ADMIN — DEXAMETHASONE SODIUM PHOSPHATE 4 MG: 4 INJECTION, SOLUTION INTRAMUSCULAR; INTRAVENOUS at 21:07

## 2021-12-26 RX ADMIN — LOSARTAN POTASSIUM: 50 TABLET ORAL at 08:55

## 2021-12-26 RX ADMIN — GABAPENTIN 600 MG: 300 CAPSULE ORAL at 21:06

## 2021-12-26 RX ADMIN — METOPROLOL SUCCINATE 25 MG: 25 TABLET, EXTENDED RELEASE ORAL at 08:55

## 2021-12-26 RX ADMIN — HYDROMORPHONE HYDROCHLORIDE 0.5 MG: 1 INJECTION, SOLUTION INTRAMUSCULAR; INTRAVENOUS; SUBCUTANEOUS at 05:16

## 2021-12-26 RX ADMIN — DOCUSATE SODIUM 50MG AND SENNOSIDES 8.6MG 2 TABLET: 8.6; 5 TABLET, FILM COATED ORAL at 11:38

## 2021-12-26 RX ADMIN — DEXAMETHASONE SODIUM PHOSPHATE 4 MG: 4 INJECTION, SOLUTION INTRAMUSCULAR; INTRAVENOUS at 08:56

## 2021-12-26 RX ADMIN — DEXAMETHASONE SODIUM PHOSPHATE 4 MG: 4 INJECTION, SOLUTION INTRAMUSCULAR; INTRAVENOUS at 01:48

## 2021-12-26 RX ADMIN — GABAPENTIN 600 MG: 300 CAPSULE ORAL at 08:56

## 2021-12-26 RX ADMIN — POLYETHYLENE GLYCOL 3350 17 G: 17 POWDER, FOR SOLUTION ORAL at 11:38

## 2021-12-27 ENCOUNTER — HOSPITAL ENCOUNTER (OUTPATIENT)
Dept: PAIN MEDICINE | Facility: HOSPITAL | Age: 61
Discharge: HOME | End: 2021-12-27

## 2021-12-27 ENCOUNTER — APPOINTMENT (OUTPATIENT)
Dept: GENERAL RADIOLOGY | Facility: HOSPITAL | Age: 61
End: 2021-12-27

## 2021-12-27 ENCOUNTER — ANESTHESIA (OUTPATIENT)
Dept: PAIN MEDICINE | Facility: HOSPITAL | Age: 61
End: 2021-12-27

## 2021-12-27 ENCOUNTER — ANESTHESIA EVENT (OUTPATIENT)
Dept: PAIN MEDICINE | Facility: HOSPITAL | Age: 61
End: 2021-12-27

## 2021-12-27 VITALS
HEART RATE: 51 BPM | TEMPERATURE: 97.1 F | OXYGEN SATURATION: 96 % | DIASTOLIC BLOOD PRESSURE: 88 MMHG | SYSTOLIC BLOOD PRESSURE: 154 MMHG | RESPIRATION RATE: 16 BRPM

## 2021-12-27 VITALS
TEMPERATURE: 97.6 F | WEIGHT: 220 LBS | OXYGEN SATURATION: 97 % | SYSTOLIC BLOOD PRESSURE: 116 MMHG | DIASTOLIC BLOOD PRESSURE: 63 MMHG | BODY MASS INDEX: 34.53 KG/M2 | HEART RATE: 52 BPM | RESPIRATION RATE: 16 BRPM | HEIGHT: 67 IN

## 2021-12-27 DIAGNOSIS — M54.16 SPINAL STENOSIS OF LUMBAR REGION WITH RADICULOPATHY: Primary | ICD-10-CM

## 2021-12-27 DIAGNOSIS — M48.061 SPINAL STENOSIS OF LUMBAR REGION WITH RADICULOPATHY: Primary | ICD-10-CM

## 2021-12-27 LAB
ANION GAP SERPL CALCULATED.3IONS-SCNC: 10.9 MMOL/L (ref 5–15)
BUN SERPL-MCNC: 34 MG/DL (ref 8–23)
BUN/CREAT SERPL: 35.4 (ref 7–25)
CALCIUM SPEC-SCNC: 9.1 MG/DL (ref 8.6–10.5)
CHLORIDE SERPL-SCNC: 99 MMOL/L (ref 98–107)
CO2 SERPL-SCNC: 22.1 MMOL/L (ref 22–29)
CREAT SERPL-MCNC: 0.96 MG/DL (ref 0.76–1.27)
DEPRECATED RDW RBC AUTO: 41.5 FL (ref 37–54)
ERYTHROCYTE [DISTWIDTH] IN BLOOD BY AUTOMATED COUNT: 12.4 % (ref 12.3–15.4)
GFR SERPL CREATININE-BSD FRML MDRD: 80 ML/MIN/1.73
GLUCOSE BLDC GLUCOMTR-MCNC: 147 MG/DL (ref 70–130)
GLUCOSE BLDC GLUCOMTR-MCNC: 190 MG/DL (ref 70–130)
GLUCOSE SERPL-MCNC: 139 MG/DL (ref 65–99)
HCT VFR BLD AUTO: 48 % (ref 37.5–51)
HGB BLD-MCNC: 17.4 G/DL (ref 13–17.7)
MAGNESIUM SERPL-MCNC: 2.7 MG/DL (ref 1.6–2.4)
MCH RBC QN AUTO: 33.5 PG (ref 26.6–33)
MCHC RBC AUTO-ENTMCNC: 36.3 G/DL (ref 31.5–35.7)
MCV RBC AUTO: 92.3 FL (ref 79–97)
PHOSPHATE SERPL-MCNC: 4.7 MG/DL (ref 2.5–4.5)
PLATELET # BLD AUTO: 222 10*3/MM3 (ref 140–450)
PMV BLD AUTO: 9.2 FL (ref 6–12)
POTASSIUM SERPL-SCNC: 4.5 MMOL/L (ref 3.5–5.2)
RBC # BLD AUTO: 5.2 10*6/MM3 (ref 4.14–5.8)
SODIUM SERPL-SCNC: 132 MMOL/L (ref 136–145)
WBC NRBC COR # BLD: 12.89 10*3/MM3 (ref 3.4–10.8)

## 2021-12-27 PROCEDURE — 82962 GLUCOSE BLOOD TEST: CPT

## 2021-12-27 PROCEDURE — 85027 COMPLETE CBC AUTOMATED: CPT | Performed by: STUDENT IN AN ORGANIZED HEALTH CARE EDUCATION/TRAINING PROGRAM

## 2021-12-27 PROCEDURE — 25010000002 DEXAMETHASONE PER 1 MG: Performed by: ANESTHESIOLOGY

## 2021-12-27 PROCEDURE — 84100 ASSAY OF PHOSPHORUS: CPT | Performed by: STUDENT IN AN ORGANIZED HEALTH CARE EDUCATION/TRAINING PROGRAM

## 2021-12-27 PROCEDURE — 80048 BASIC METABOLIC PNL TOTAL CA: CPT | Performed by: STUDENT IN AN ORGANIZED HEALTH CARE EDUCATION/TRAINING PROGRAM

## 2021-12-27 PROCEDURE — 25010000002 DEXAMETHASONE PER 1 MG: Performed by: NURSE PRACTITIONER

## 2021-12-27 PROCEDURE — 0 IOPAMIDOL 41 % SOLUTION: Performed by: ANESTHESIOLOGY

## 2021-12-27 PROCEDURE — 77003 FLUOROGUIDE FOR SPINE INJECT: CPT

## 2021-12-27 PROCEDURE — 99231 SBSQ HOSP IP/OBS SF/LOW 25: CPT | Performed by: NURSE PRACTITIONER

## 2021-12-27 PROCEDURE — 3E0S33Z INTRODUCTION OF ANTI-INFLAMMATORY INTO EPIDURAL SPACE, PERCUTANEOUS APPROACH: ICD-10-PCS | Performed by: INTERNAL MEDICINE

## 2021-12-27 PROCEDURE — 83735 ASSAY OF MAGNESIUM: CPT | Performed by: STUDENT IN AN ORGANIZED HEALTH CARE EDUCATION/TRAINING PROGRAM

## 2021-12-27 RX ORDER — GABAPENTIN 300 MG/1
600 CAPSULE ORAL EVERY 12 HOURS SCHEDULED
Qty: 6 CAPSULE | Refills: 0 | Status: ON HOLD | OUTPATIENT
Start: 2021-12-27 | End: 2022-01-06

## 2021-12-27 RX ORDER — FENTANYL CITRATE 50 UG/ML
100 INJECTION, SOLUTION INTRAMUSCULAR; INTRAVENOUS ONCE
Status: DISCONTINUED | OUTPATIENT
Start: 2021-12-27 | End: 2021-12-28 | Stop reason: HOSPADM

## 2021-12-27 RX ORDER — MIDAZOLAM HYDROCHLORIDE 1 MG/ML
2 INJECTION INTRAMUSCULAR; INTRAVENOUS ONCE
Status: DISCONTINUED | OUTPATIENT
Start: 2021-12-27 | End: 2021-12-28 | Stop reason: HOSPADM

## 2021-12-27 RX ORDER — AMOXICILLIN 250 MG
2 CAPSULE ORAL 2 TIMES DAILY
Qty: 30 TABLET | Refills: 0 | Status: SHIPPED | OUTPATIENT
Start: 2021-12-27

## 2021-12-27 RX ORDER — BUPIVACAINE HYDROCHLORIDE AND EPINEPHRINE 2.5; 5 MG/ML; UG/ML
10 INJECTION, SOLUTION EPIDURAL; INFILTRATION; INTRACAUDAL; PERINEURAL ONCE
Status: COMPLETED | OUTPATIENT
Start: 2021-12-27 | End: 2021-12-27

## 2021-12-27 RX ORDER — HYDROCODONE BITARTRATE AND ACETAMINOPHEN 7.5; 325 MG/1; MG/1
1 TABLET ORAL EVERY 4 HOURS PRN
Qty: 18 TABLET | Refills: 0 | Status: SHIPPED | OUTPATIENT
Start: 2021-12-27 | End: 2021-12-30

## 2021-12-27 RX ORDER — DEXAMETHASONE SODIUM PHOSPHATE 10 MG/ML
10 INJECTION, SOLUTION INTRAMUSCULAR; INTRAVENOUS ONCE
Status: COMPLETED | OUTPATIENT
Start: 2021-12-27 | End: 2021-12-27

## 2021-12-27 RX ORDER — POLYETHYLENE GLYCOL 3350 17 G/17G
17 POWDER, FOR SOLUTION ORAL DAILY
Qty: 14 EACH | Refills: 0 | Status: ON HOLD | OUTPATIENT
Start: 2021-12-28 | End: 2022-01-06

## 2021-12-27 RX ORDER — DEXAMETHASONE 1 MG
TABLET ORAL
Qty: 30 TABLET | Refills: 0 | Status: SHIPPED | OUTPATIENT
Start: 2021-12-27 | End: 2022-01-10 | Stop reason: HOSPADM

## 2021-12-27 RX ORDER — METOPROLOL SUCCINATE 25 MG/1
25 TABLET, EXTENDED RELEASE ORAL
Qty: 30 TABLET | Refills: 0 | Status: SHIPPED | OUTPATIENT
Start: 2021-12-28

## 2021-12-27 RX ORDER — CYCLOBENZAPRINE HCL 10 MG
10 TABLET ORAL 3 TIMES DAILY
Qty: 30 TABLET | Refills: 0 | Status: ON HOLD | OUTPATIENT
Start: 2021-12-27 | End: 2022-01-10 | Stop reason: SDUPTHER

## 2021-12-27 RX ORDER — LIDOCAINE HYDROCHLORIDE 10 MG/ML
1 INJECTION, SOLUTION INFILTRATION; PERINEURAL ONCE
Status: DISCONTINUED | OUTPATIENT
Start: 2021-12-27 | End: 2021-12-28 | Stop reason: HOSPADM

## 2021-12-27 RX ADMIN — BUPIVACAINE HYDROCHLORIDE AND EPINEPHRINE BITARTRATE 10 ML: 2.5; .005 INJECTION, SOLUTION EPIDURAL; INFILTRATION; INTRACAUDAL; PERINEURAL at 09:55

## 2021-12-27 RX ADMIN — CYCLOBENZAPRINE 10 MG: 10 TABLET, FILM COATED ORAL at 15:51

## 2021-12-27 RX ADMIN — LOSARTAN POTASSIUM: 50 TABLET ORAL at 10:52

## 2021-12-27 RX ADMIN — GABAPENTIN 600 MG: 300 CAPSULE ORAL at 10:53

## 2021-12-27 RX ADMIN — DOCUSATE SODIUM 50MG AND SENNOSIDES 8.6MG 2 TABLET: 8.6; 5 TABLET, FILM COATED ORAL at 10:51

## 2021-12-27 RX ADMIN — IOPAMIDOL 10 ML: 408 INJECTION, SOLUTION INTRATHECAL at 09:48

## 2021-12-27 RX ADMIN — POLYETHYLENE GLYCOL 3350 17 G: 17 POWDER, FOR SOLUTION ORAL at 10:52

## 2021-12-27 RX ADMIN — HYDROCODONE BITARTRATE AND ACETAMINOPHEN 1 TABLET: 7.5; 325 TABLET ORAL at 10:52

## 2021-12-27 RX ADMIN — MONTELUKAST SODIUM 10 MG: 10 TABLET, FILM COATED ORAL at 10:53

## 2021-12-27 RX ADMIN — PANTOPRAZOLE SODIUM 40 MG: 40 TABLET, DELAYED RELEASE ORAL at 10:52

## 2021-12-27 RX ADMIN — HYDROCODONE BITARTRATE AND ACETAMINOPHEN 1 TABLET: 7.5; 325 TABLET ORAL at 15:51

## 2021-12-27 RX ADMIN — DEXAMETHASONE SODIUM PHOSPHATE 10 MG: 10 INJECTION INTRAMUSCULAR; INTRAVENOUS at 09:55

## 2021-12-27 RX ADMIN — DEXAMETHASONE SODIUM PHOSPHATE 4 MG: 4 INJECTION, SOLUTION INTRAMUSCULAR; INTRAVENOUS at 02:05

## 2021-12-27 RX ADMIN — CYCLOBENZAPRINE 10 MG: 10 TABLET, FILM COATED ORAL at 10:52

## 2021-12-27 RX ADMIN — METOPROLOL SUCCINATE 25 MG: 25 TABLET, EXTENDED RELEASE ORAL at 10:52

## 2021-12-27 RX ADMIN — HYDROCODONE BITARTRATE AND ACETAMINOPHEN 1 TABLET: 7.5; 325 TABLET ORAL at 02:07

## 2021-12-27 NOTE — H&P
[facility]    History and Physical    Patient Name: Christiano Barr  :  1960  MRN:  [id number]  Date of Admission: 2021    Subjective     Patient is a 61 y.o. male presents with chief complaint of acute, constant, moderate, severe, 7/10, burning, tingling, stabbing, due to an injury while driving ba tractor low back and leg: right pain.  Onset of symptoms was abrupt starting 2 weeks ago.  Symptoms are associated/aggravated by standing or walking for more than several minutes. Symptoms improve with lying down    The following portions of the patients history were reviewed and updated as appropriate: current medications, allergies, past medical history, past surgical history, past family history, past social history and problem list                Objective     Past Medical History:   Past Medical History:   Diagnosis Date   • Acid reflux    • Arthritis     OSTEO   • Hypertension    • Risk factors for obstructive sleep apnea     YOAN 7     Past Surgical History:   Past Surgical History:   Procedure Laterality Date   • APPENDECTOMY     • INGUINAL HERNIA REPAIR Right    • TOTAL KNEE ARTHROPLASTY Left 2018    Procedure: LT TOTAL KNEE ARTHROPLASTY;  Surgeon: Ralph Panchal MD;  Location: [facility];  Service:      Family History:   Family History   Problem Relation Age of Onset   • Malig Hyperthermia Neg Hx      Social History:   Social History     Socioeconomic History   • Marital status:    Tobacco Use   • Smoking status: Never Smoker   • Smokeless tobacco: Never Used   Substance and Sexual Activity   • Alcohol use: Yes     Alcohol/week: 8.0 standard drinks     Types: 8 Cans of beer per week   • Drug use: No   • Sexual activity: Defer       Vital Signs Range for the last 24 hours  Temperature: Temp:  [36.2 °C (97.1 °F)-37.1 °C (98.8 °F)] 36.2 °C (97.1 °F)   Temp Source: Temp src: Infrared   BP: BP: (113-163)/(63-86) 163/86   Pulse: Heart Rate:  [49-60] 49   Respirations: Resp:   [16] 16   SPO2: SpO2:  [94 %-97 %] 94 %   O2 Amount (l/min):     O2 Devices Device (Oxygen Therapy): room air   Weight:           --------------------------------------------------------------------------------    Current Facility-Administered Medications   Medication Dose Route Frequency Provider Last Rate Last Admin   • bupivacaine-EPINEPHrine PF (MARCAINE w/EPI) 0.25% -[id number] injection 10 mL  10 mL Epidural Once Christiano Mckeon MD       • dexamethasone sodium phosphate injection 10 mg  10 mg Peripheral Nerve Once Christiano Mckeon MD       • fentaNYL citrate (PF) (SUBLIMAZE) injection 100 mcg  100 mcg Intravenous Once Christiano Mckeon MD       • iopamidol (ISOVUE-M 200) injection 41%  2 mL Epidural Once in imaging Christiano Mckeon MD       • lidocaine (XYLOCAINE) 1 % injection 1 mL  1 mL Intradermal Once Christiano Mckeon MD       • midazolam (VERSED) injection 2 mg  2 mg Intravenous Once Christiano Mckeon MD         No current outpatient medications on file.     Facility-Administered Medications Ordered in Other Encounters   Medication Dose Route Frequency Provider Last Rate Last Admin   • acetaminophen (TYLENOL) tablet 650 mg  650 mg Oral Q4H PRN Jose Sparrow APRN       • bisacodyl (DULCOLAX) suppository 10 mg  10 mg Rectal Daily PRN Ilia Briggs MD       • cyclobenzaprine (FLEXERIL) tablet 10 mg  10 mg Oral TID Andrews Childress MD   10 mg at 12/26/21 2107   • dexamethasone (DECADRON) injection 4 mg  4 mg Intravenous Q6H Ambar Haynes APRN   4 mg at 12/27/21 0205   • dextrose (D50W) (25 g/50 mL) IV injection 25 g  25 g Intravenous Q15 Min PRN Ilia Briggs MD       • dextrose (GLUTOSE) oral gel 15 g  15 g Oral Q15 Min PRN Ilia Briggs MD       • gabapentin (NEURONTIN) capsule 600 mg  600 mg Oral Q12H Luis Brewer MD   600 mg at 12/26/21 2106   • glucagon (human recombinant) (GLUCAGEN DIAGNOSTIC) injection 1 mg  1 mg Subcutaneous Q15 Min PRN Ilia Briggs MD        • HYDROcodone-acetaminophen (NORCO) 7.5-325 MG per tablet 1 tablet  1 tablet Oral Q4H PRN Yosvany Elizabeth MD   1 tablet at 12/27/21 0207   • HYDROmorphone (DILAUDID) injection 0.5 mg  0.5 mg Intravenous Q2H PRN Yosvany Elizabeth MD   0.5 mg at 12/26/21 0516   • insulin lispro (ADMELOG) injection 0-7 Units  0-7 Units Subcutaneous TID AC Ilia Briggs MD   2 Units at 12/26/21 1730   • losartan (COZAAR) 100 mg, hydroCHLOROthiazide (HYDRODIURIL) 25 mg for HYZAAR 100-25   Oral Daily Zoey Moss APRN   Given at 12/26/21 0855   • melatonin tablet 3 mg  3 mg Oral Nightly PRN Jose Sparrow APRN   3 mg at 12/26/21 2106   • metoprolol succinate XL (TOPROL-XL) 24 hr tablet 25 mg  25 mg Oral Q24H Ilia Briggs MD   25 mg at 12/26/21 0855   • montelukast (SINGULAIR) tablet 10 mg  10 mg Oral Daily Zoey Moss APRN   10 mg at 12/26/21 0855   • ondansetron (ZOFRAN) tablet 4 mg  4 mg Oral Q6H PRN Jose Sparrow APRN        Or   • ondansetron (ZOFRAN) injection 4 mg  4 mg Intravenous Q6H PRN Jose Sparrow APRN       • pantoprazole (PROTONIX) EC tablet 40 mg  40 mg Oral Daily Zoey Moss APRN   40 mg at 12/26/21 0855   • polyethylene glycol (MIRALAX) packet 17 g  17 g Oral Daily Ilia Briggs MD   17 g at 12/26/21 1138   • sennosides-docusate (PERICOLACE) 8.6-50 MG per tablet 2 tablet  2 tablet Oral BID Ilia Briggs MD   2 tablet at 12/26/21 2107       --------------------------------------------------------------------------------  Assessment/Plan      Anesthesia Evaluation     Patient summary reviewed and Nursing notes reviewed                Airway   Mallampati: II  Dental - normal exam     Pulmonary - negative pulmonary ROS and normal exam   Cardiovascular - normal exam    (+) hypertension,       Neuro/Psych- negative ROS and neuro exam normal  GI/Hepatic/Renal/Endo - negative ROS     Musculoskeletal (-) negative ROS and normal exam    Abdominal  -  normal exam   Substance History - negative use     OB/GYN negative ob/gyn ROS         Other                   Diagnosis and Plan    Treatment Plan  ASA 2      Procedures: Selective Nerve Root Injection, With fluoroscopy,       Anesthetic plan and risks discussed with patient.          Diagnosis     * Lumbar spinal stenosis [M48.061]     * Lumbar radiculopathy [M54.16]            CHIEF COMPLAINT:       HISTORY OF PRESENT ILLNESS:      PAST MEDICAL HISTORY:  Current Facility-Administered Medications on File Prior to Encounter   Medication Dose Route Frequency Provider Last Rate Last Admin   • acetaminophen (TYLENOL) tablet 650 mg  650 mg Oral Q4H PRN Jose Sparrow APRN       • bisacodyl (DULCOLAX) suppository 10 mg  10 mg Rectal Daily PRN Ilia Briggs MD       • cyclobenzaprine (FLEXERIL) tablet 10 mg  10 mg Oral TID Andrews Childress MD   10 mg at 12/26/21 2107   • dexamethasone (DECADRON) injection 4 mg  4 mg Intravenous Q6H Ambar Haynes APRN   4 mg at 12/27/21 0205   • dextrose (D50W) (25 g/50 mL) IV injection 25 g  25 g Intravenous Q15 Min PRN Ilia Briggs MD       • dextrose (GLUTOSE) oral gel 15 g  15 g Oral Q15 Min PRN Ilia Briggs MD       • gabapentin (NEURONTIN) capsule 600 mg  600 mg Oral Q12H Luis Brewer MD   600 mg at 12/26/21 2106   • glucagon (human recombinant) (GLUCAGEN DIAGNOSTIC) injection 1 mg  1 mg Subcutaneous Q15 Min PRN Ilia Briggs MD       • HYDROcodone-acetaminophen (NORCO) 7.5-325 MG per tablet 1 tablet  1 tablet Oral Q4H PRN Yosvany Elizabeth MD   1 tablet at 12/27/21 0207   • HYDROmorphone (DILAUDID) injection 0.5 mg  0.5 mg Intravenous Q2H PRN Yosvany Elizabeth MD   0.5 mg at 12/26/21 0516   • insulin lispro (ADMELOG) injection 0-7 Units  0-7 Units Subcutaneous TID AC Ilia Briggs MD   2 Units at 12/26/21 1730   • losartan (COZAAR) 100 mg, hydroCHLOROthiazide (HYDRODIURIL) 25 mg for HYZAAR 100-25   Oral Daily Isa,  HERO Lutz   Given at 12/26/21 0855   • melatonin tablet 3 mg  3 mg Oral Nightly PRN Jose Sparrow APRN   3 mg at 12/26/21 2106   • metoprolol succinate XL (TOPROL-XL) 24 hr tablet 25 mg  25 mg Oral Q24H Ilia Briggs MD   25 mg at 12/26/21 0855   • montelukast (SINGULAIR) tablet 10 mg  10 mg Oral Daily Zoey Moss APRN   10 mg at 12/26/21 0855   • ondansetron (ZOFRAN) tablet 4 mg  4 mg Oral Q6H PRN Jose Sparrow APRN        Or   • ondansetron (ZOFRAN) injection 4 mg  4 mg Intravenous Q6H PRN Jose Sparrow APRN       • pantoprazole (PROTONIX) EC tablet 40 mg  40 mg Oral Daily Zoey Moss APRN   40 mg at 12/26/21 0855   • polyethylene glycol (MIRALAX) packet 17 g  17 g Oral Daily Ilia Briggs MD   17 g at 12/26/21 1138   • sennosides-docusate (PERICOLACE) 8.6-50 MG per tablet 2 tablet  2 tablet Oral BID Ilia Briggs MD   2 tablet at 12/26/21 2107     Current Outpatient Medications on File Prior to Encounter   Medication Sig Dispense Refill   • HYDROcodone-acetaminophen (NORCO) 5-325 MG per tablet Take 1 tablet by mouth Every 6 (Six) Hours As Needed for Moderate Pain .     • losartan-hydrochlorothiazide (HYZAAR) 100-25 MG per tablet Take 1 tablet by mouth Daily.     • montelukast (SINGULAIR) 10 MG tablet Take 10 mg by mouth Daily.     • pantoprazole (PROTONIX) 40 MG EC tablet Take 40 mg by mouth Daily.     • traMADol-acetaminophen (ULTRACET) 37.5-325 MG per tablet Take 1 tablet by mouth Every 6 (Six) Hours As Needed for Moderate Pain .         Past Medical History:   Diagnosis Date   • Acid reflux    • Arthritis     OSTEO   • Hypertension    • Risk factors for obstructive sleep apnea     YOAN 7         SOCIAL HISTORY:  No tobacco    REVIEW OF SYSTEMS:  No hematologic infectious or constitutional symptoms  Other review of systems non-contributory    PHYSICAL EXAM:  /86 (BP Location: Left arm, Patient Position: Lying)   Pulse (!) 49   Temp 36.2 °C  (97.1 °F) (Infrared)   Resp 16   SpO2 94%   Well-developed well-nourished no acute distress  Extra ocular movements intact  Mallampati class 2 airway  Cardiac:  Regular rate and rhythm  Lungs:  Clear to auscultation bilaterally with good effort  Alert and oriented ×3  Deep tendon reflexes normal in the bilateral patella  Negative straight leg raise bilaterally  5 out of 5 strength bilateral upper and lower extremities  Lumbar spine without obvious deformities ecchymoses  Lumbar spine nontender to palpation      DIAGNOSIS:  Post-Op Diagnosis Codes:     * Lumbar spinal stenosis [M48.061]     * Lumbar radiculopathy [M54.16]    PLAN:  1.  Lumbar transforaminal epidural steroid injections, up to 4, spaced 2-3 weeks apart.  If pain control is acceptable after 1 or 2 injections, it was discussed with the patient that they may return for the subsequent injections if and when their pain returns.  The risks were discussed with the patient including failure of relief, worsening pain, Headache (post dural puncture headache), bleeding (epidural hematoma) and infection (epidural abscess or skin infection).  2.  Physical therapy exercises at home as prescribed by physical therapy or from the pain clinic handout (given to the patient).  Continuation of these exercises every day, or multiple times per week, even when the patient has good pain relief, was stressed to the patient as a preventative measure to decrease the frequency and severity of future pain episodes.  3.  Continue pain medicines as already prescribed.  If patient not currently taking any, it is recommended to begin Acetaminophen 1000 mg po q 8 hours.  If other medicines containing Acetaminophen are currently prescribed, maintain daily dose at 3000 mg.    4.  If they can tolerate NSAIDS, it is recommended to take Ibuprofen 600 mg po q 6 hours for 7 days during pain exacerbations.  Alternatively, they may substitute an NSAID of their choice (e.g. Aleve).  This may be  taken at the same time as Acetaminophen.  5.  Heat and ice to the affected area as tolerated for pain control.  It was discussed that heating pads can cause burns.  6.  Daily low impact exercise such as walking or water exercise was recommended to maintain overall health and aid in weight control.   7.  Follow up as needed for subsequent injections.  8.  Patient was counseled to abstain from tobacco products.    Target : L5S1 right  Time :  24    min

## 2021-12-27 NOTE — ANESTHESIA PROCEDURE NOTES
Transforaminal epidural right, L5S1    Pre-sedation assessment completed: 12/27/2021 9:35 AM    Patient reassessed immediately prior to procedure    Patient location during procedure: pain clinic  Start Time: 12/27/2021 9:35 AM  Stop Time: 12/27/2021 9:58 AM  Indication:at surgeon's request and procedure for pain  Performed By  Anesthesiologist: Christiano Mckeon MD  Preanesthetic Checklist  Completed: patient identified, IV checked, site marked, risks and benefits discussed, surgical consent, monitors and equipment checked, pre-op evaluation and timeout performed  Additional Notes  Dx : Post-Op Diagnosis Codes:     * Lumbar spinal stenosis (M48.061)     * Lumbar radiculopathy (M54.16)  Prep:  Pt Position:prone  Sterile Tech:cap, gloves, mask and sterile barrier  Prep:chlorhexidine gluconate and isopropyl alcohol  Monitoring:blood pressure monitoring, continuous pulse oximetry and EKG  Procedure:Sedation: no     Guidance: fluoroscopy and pa and lat  Location:lumbar  Level:L5-S1 (right)  Needle Type:Other (blunt tip epimed needle 22 G)  Needle Gauge:22 G  Aspiration:negative  Test Dose:negative  Medications:  Isovue:1mL  Comments:Dexamethasone 10 mg  Lidocaine 1% - 0.5 cc  Bupivacaine 0.25 % - 0.5cc  Post Assessment:  Post-procedure: bandaid.  Pt Tolerance:patient tolerated the procedure well with no apparent complications  Complications:no

## 2021-12-27 NOTE — H&P (VIEW-ONLY)
[facility]    History and Physical    Patient Name: Christiano Barr  :  1960  MRN:  [id number]  Date of Admission: 2021    Subjective     Patient is a 61 y.o. male presents with chief complaint of acute, constant, moderate, severe, 7/10, burning, tingling, stabbing, due to an injury while driving ba tractor low back and leg: right pain.  Onset of symptoms was abrupt starting 2 weeks ago.  Symptoms are associated/aggravated by standing or walking for more than several minutes. Symptoms improve with lying down    The following portions of the patients history were reviewed and updated as appropriate: current medications, allergies, past medical history, past surgical history, past family history, past social history and problem list                Objective     Past Medical History:   Past Medical History:   Diagnosis Date   • Acid reflux    • Arthritis     OSTEO   • Hypertension    • Risk factors for obstructive sleep apnea     YOAN 7     Past Surgical History:   Past Surgical History:   Procedure Laterality Date   • APPENDECTOMY     • INGUINAL HERNIA REPAIR Right    • TOTAL KNEE ARTHROPLASTY Left 2018    Procedure: LT TOTAL KNEE ARTHROPLASTY;  Surgeon: Ralph Panchal MD;  [facility];  Service:      Family History:   Family History   Problem Relation Age of Onset   • Malig Hyperthermia Neg Hx      Social History:   Social History     Socioeconomic History   • Marital status:    Tobacco Use   • Smoking status: Never Smoker   • Smokeless tobacco: Never Used   Substance and Sexual Activity   • Alcohol use: Yes     Alcohol/week: 8.0 standard drinks     Types: 8 Cans of beer per week   • Drug use: No   • Sexual activity: Defer       Vital Signs Range for the last 24 hours  Temperature: Temp:  [36.2 °C (97.1 °F)-37.1 °C (98.8 °F)] 36.2 °C (97.1 °F)   Temp Source: Temp src: Infrared   BP: BP: (113-163)/(63-86) 163/86   Pulse: Heart Rate:  [49-60] 49   Respirations: Resp:   [16] 16   SPO2: SpO2:  [94 %-97 %] 94 %   O2 Amount (l/min):     O2 Devices Device (Oxygen Therapy): room air   Weight:           --------------------------------------------------------------------------------    Current Facility-Administered Medications   Medication Dose Route Frequency Provider Last Rate Last Admin   • bupivacaine-EPINEPHrine PF (MARCAINE w/EPI) 0.25% -[id number] injection 10 mL  10 mL Epidural Once Christiano Mckeon MD       • dexamethasone sodium phosphate injection 10 mg  10 mg Peripheral Nerve Once Christiano Mckeon MD       • fentaNYL citrate (PF) (SUBLIMAZE) injection 100 mcg  100 mcg Intravenous Once Christiano Mckeon MD       • iopamidol (ISOVUE-M 200) injection 41%  2 mL Epidural Once in imaging Christiano Mckeon MD       • lidocaine (XYLOCAINE) 1 % injection 1 mL  1 mL Intradermal Once Christiano Mckeon MD       • midazolam (VERSED) injection 2 mg  2 mg Intravenous Once Christiano Mckeon MD         No current outpatient medications on file.     Facility-Administered Medications Ordered in Other Encounters   Medication Dose Route Frequency Provider Last Rate Last Admin   • acetaminophen (TYLENOL) tablet 650 mg  650 mg Oral Q4H PRN Jose Sparrow APRN       • bisacodyl (DULCOLAX) suppository 10 mg  10 mg Rectal Daily PRN Ilai Briggs MD       • cyclobenzaprine (FLEXERIL) tablet 10 mg  10 mg Oral TID Andrews Childress MD   10 mg at 12/26/21 2107   • dexamethasone (DECADRON) injection 4 mg  4 mg Intravenous Q6H Ambar Haynes APRN   4 mg at 12/27/21 0205   • dextrose (D50W) (25 g/50 mL) IV injection 25 g  25 g Intravenous Q15 Min PRN Ilia Briggs MD       • dextrose (GLUTOSE) oral gel 15 g  15 g Oral Q15 Min PRN Ilia Briggs MD       • gabapentin (NEURONTIN) capsule 600 mg  600 mg Oral Q12H Luis Berwer MD   600 mg at 12/26/21 2106   • glucagon (human recombinant) (GLUCAGEN DIAGNOSTIC) injection 1 mg  1 mg Subcutaneous Q15 Min PRN Ilia Briggs MD        • HYDROcodone-acetaminophen (NORCO) 7.5-325 MG per tablet 1 tablet  1 tablet Oral Q4H PRN Yosvany Elizabeth MD   1 tablet at 12/27/21 0207   • HYDROmorphone (DILAUDID) injection 0.5 mg  0.5 mg Intravenous Q2H PRN Yosvany Elizabeth MD   0.5 mg at 12/26/21 0516   • insulin lispro (ADMELOG) injection 0-7 Units  0-7 Units Subcutaneous TID AC Ilia Briggs MD   2 Units at 12/26/21 1730   • losartan (COZAAR) 100 mg, hydroCHLOROthiazide (HYDRODIURIL) 25 mg for HYZAAR 100-25   Oral Daily oZey Moss APRN   Given at 12/26/21 0855   • melatonin tablet 3 mg  3 mg Oral Nightly PRN Jose Sparrow APRN   3 mg at 12/26/21 2106   • metoprolol succinate XL (TOPROL-XL) 24 hr tablet 25 mg  25 mg Oral Q24H Ilia Briggs MD   25 mg at 12/26/21 0855   • montelukast (SINGULAIR) tablet 10 mg  10 mg Oral Daily Zoey Moss APRN   10 mg at 12/26/21 0855   • ondansetron (ZOFRAN) tablet 4 mg  4 mg Oral Q6H PRN Jose Sparrow APRN        Or   • ondansetron (ZOFRAN) injection 4 mg  4 mg Intravenous Q6H PRN Jose Sparrow APRN       • pantoprazole (PROTONIX) EC tablet 40 mg  40 mg Oral Daily Zoey Moss APRN   40 mg at 12/26/21 0855   • polyethylene glycol (MIRALAX) packet 17 g  17 g Oral Daily Ilia Briggs MD   17 g at 12/26/21 1138   • sennosides-docusate (PERICOLACE) 8.6-50 MG per tablet 2 tablet  2 tablet Oral BID Ilia Briggs MD   2 tablet at 12/26/21 2107       --------------------------------------------------------------------------------  Assessment/Plan      Anesthesia Evaluation     Patient summary reviewed and Nursing notes reviewed                Airway   Mallampati: II  Dental - normal exam     Pulmonary - negative pulmonary ROS and normal exam   Cardiovascular - normal exam    (+) hypertension,       Neuro/Psych- negative ROS and neuro exam normal  GI/Hepatic/Renal/Endo - negative ROS     Musculoskeletal (-) negative ROS and normal exam    Abdominal  -  normal exam   Substance History - negative use     OB/GYN negative ob/gyn ROS         Other                   Diagnosis and Plan    Treatment Plan  ASA 2      Procedures: Selective Nerve Root Injection, With fluoroscopy,       Anesthetic plan and risks discussed with patient.          Diagnosis     * Lumbar spinal stenosis [M48.061]     * Lumbar radiculopathy [M54.16]            CHIEF COMPLAINT:       HISTORY OF PRESENT ILLNESS:      PAST MEDICAL HISTORY:  Current Facility-Administered Medications on File Prior to Encounter   Medication Dose Route Frequency Provider Last Rate Last Admin   • acetaminophen (TYLENOL) tablet 650 mg  650 mg Oral Q4H PRN Jose Sparrow APRN       • bisacodyl (DULCOLAX) suppository 10 mg  10 mg Rectal Daily PRN Ilia Briggs MD       • cyclobenzaprine (FLEXERIL) tablet 10 mg  10 mg Oral TID Andrews Childress MD   10 mg at 12/26/21 2107   • dexamethasone (DECADRON) injection 4 mg  4 mg Intravenous Q6H Ambar Haynes APRN   4 mg at 12/27/21 0205   • dextrose (D50W) (25 g/50 mL) IV injection 25 g  25 g Intravenous Q15 Min PRN Ilia Briggs MD       • dextrose (GLUTOSE) oral gel 15 g  15 g Oral Q15 Min PRN Ilia Briggs MD       • gabapentin (NEURONTIN) capsule 600 mg  600 mg Oral Q12H Luis Brewer MD   600 mg at 12/26/21 2106   • glucagon (human recombinant) (GLUCAGEN DIAGNOSTIC) injection 1 mg  1 mg Subcutaneous Q15 Min PRN Ilia Briggs MD       • HYDROcodone-acetaminophen (NORCO) 7.5-325 MG per tablet 1 tablet  1 tablet Oral Q4H PRN Yosvany Elizabeth MD   1 tablet at 12/27/21 0207   • HYDROmorphone (DILAUDID) injection 0.5 mg  0.5 mg Intravenous Q2H PRN Yosvany Elizabeth MD   0.5 mg at 12/26/21 0516   • insulin lispro (ADMELOG) injection 0-7 Units  0-7 Units Subcutaneous TID AC Ilia Briggs MD   2 Units at 12/26/21 1730   • losartan (COZAAR) 100 mg, hydroCHLOROthiazide (HYDRODIURIL) 25 mg for HYZAAR 100-25   Oral Daily Isa,  HERO Lutz   Given at 12/26/21 0855   • melatonin tablet 3 mg  3 mg Oral Nightly PRN Jose Sparrow APRN   3 mg at 12/26/21 2106   • metoprolol succinate XL (TOPROL-XL) 24 hr tablet 25 mg  25 mg Oral Q24H Ilia Briggs MD   25 mg at 12/26/21 0855   • montelukast (SINGULAIR) tablet 10 mg  10 mg Oral Daily Zoey Moss APRN   10 mg at 12/26/21 0855   • ondansetron (ZOFRAN) tablet 4 mg  4 mg Oral Q6H PRN Jose Sparrow APRN        Or   • ondansetron (ZOFRAN) injection 4 mg  4 mg Intravenous Q6H PRN Jose Sparrow APRN       • pantoprazole (PROTONIX) EC tablet 40 mg  40 mg Oral Daily Zoey Moss APRN   40 mg at 12/26/21 0855   • polyethylene glycol (MIRALAX) packet 17 g  17 g Oral Daily Ilia Briggs MD   17 g at 12/26/21 1138   • sennosides-docusate (PERICOLACE) 8.6-50 MG per tablet 2 tablet  2 tablet Oral BID Ilia Briggs MD   2 tablet at 12/26/21 2107     Current Outpatient Medications on File Prior to Encounter   Medication Sig Dispense Refill   • HYDROcodone-acetaminophen (NORCO) 5-325 MG per tablet Take 1 tablet by mouth Every 6 (Six) Hours As Needed for Moderate Pain .     • losartan-hydrochlorothiazide (HYZAAR) 100-25 MG per tablet Take 1 tablet by mouth Daily.     • montelukast (SINGULAIR) 10 MG tablet Take 10 mg by mouth Daily.     • pantoprazole (PROTONIX) 40 MG EC tablet Take 40 mg by mouth Daily.     • traMADol-acetaminophen (ULTRACET) 37.5-325 MG per tablet Take 1 tablet by mouth Every 6 (Six) Hours As Needed for Moderate Pain .         Past Medical History:   Diagnosis Date   • Acid reflux    • Arthritis     OSTEO   • Hypertension    • Risk factors for obstructive sleep apnea     YOAN 7         SOCIAL HISTORY:  No tobacco    REVIEW OF SYSTEMS:  No hematologic infectious or constitutional symptoms  Other review of systems non-contributory    PHYSICAL EXAM:  /86 (BP Location: Left arm, Patient Position: Lying)   Pulse (!) 49   Temp 36.2 °C  (97.1 °F) (Infrared)   Resp 16   SpO2 94%   Well-developed well-nourished no acute distress  Extra ocular movements intact  Mallampati class 2 airway  Cardiac:  Regular rate and rhythm  Lungs:  Clear to auscultation bilaterally with good effort  Alert and oriented ×3  Deep tendon reflexes normal in the bilateral patella  Negative straight leg raise bilaterally  5 out of 5 strength bilateral upper and lower extremities  Lumbar spine without obvious deformities ecchymoses  Lumbar spine nontender to palpation      DIAGNOSIS:  Post-Op Diagnosis Codes:     * Lumbar spinal stenosis [M48.061]     * Lumbar radiculopathy [M54.16]    PLAN:  1.  Lumbar transforaminal epidural steroid injections, up to 4, spaced 2-3 weeks apart.  If pain control is acceptable after 1 or 2 injections, it was discussed with the patient that they may return for the subsequent injections if and when their pain returns.  The risks were discussed with the patient including failure of relief, worsening pain, Headache (post dural puncture headache), bleeding (epidural hematoma) and infection (epidural abscess or skin infection).  2.  Physical therapy exercises at home as prescribed by physical therapy or from the pain clinic handout (given to the patient).  Continuation of these exercises every day, or multiple times per week, even when the patient has good pain relief, was stressed to the patient as a preventative measure to decrease the frequency and severity of future pain episodes.  3.  Continue pain medicines as already prescribed.  If patient not currently taking any, it is recommended to begin Acetaminophen 1000 mg po q 8 hours.  If other medicines containing Acetaminophen are currently prescribed, maintain daily dose at 3000 mg.    4.  If they can tolerate NSAIDS, it is recommended to take Ibuprofen 600 mg po q 6 hours for 7 days during pain exacerbations.  Alternatively, they may substitute an NSAID of their choice (e.g. Aleve).  This may be  taken at the same time as Acetaminophen.  5.  Heat and ice to the affected area as tolerated for pain control.  It was discussed that heating pads can cause burns.  6.  Daily low impact exercise such as walking or water exercise was recommended to maintain overall health and aid in weight control.   7.  Follow up as needed for subsequent injections.  8.  Patient was counseled to abstain from tobacco products.    Target : L5S1 right  Time :  24    min

## 2021-12-28 ENCOUNTER — TELEPHONE (OUTPATIENT)
Dept: NEUROSURGERY | Facility: CLINIC | Age: 61
End: 2021-12-28

## 2021-12-28 NOTE — TELEPHONE ENCOUNTER
PT CALLED AND STATED THAT THE EPIDURAL IS WEARING OFF AND HIS FOOT FIRE AND STABBING PAIN, PT STATED THAT HE WILL TAKE THE HYDROCODONE BUT HE IS IN A LOT OF PAIN.     HE STATED THAT DR READ MENTIONED WORKING ON HIM THIS Friday IF HE DOES NOT IMPROVE.  PLEASE CALL PT AND ADVISE     THANK YOU

## 2021-12-28 NOTE — TELEPHONE ENCOUNTER
"     Caller: JULIET MATTSON    Relationship to patient: SELF    Best call back number: [phone]    Chief complaint: PAIN    Type of visit: F/U    Requested date: 1-2 WEEKS FROM 12/27/21    If rescheduling, when is the original appointment:     Additional notes: PT WAS IN PT IN THE HOSPITAL AND SEEN DR. MICHELLE, JENNA ABREU AND DR. READ.  DR. YAP PN FROM 12/26/21 STATES \"The transforaminal epidural block is tomorrow (12/27/21).  If that does not help then will commit to doing a decompression and fusion later on this week.\"    PT DID HAVE THE EPIDURAL BLOCK AND IT HAS HELPED A LITTLE.  PT STATES HIS FOOT AND LEG ARE STILL ON FIRE.  PT IS UNSURE WHAT HE SHOULD DO AND WOULD LIKE TO SCHEDULE AN APPT TO F/U TO SEE THE RECOMMENDATIONS OF DR. READ.  HOSPITAL F/U PAPERS STATE TO F/U IN 1-2 WEEKS.      PLEASE CALL PT  THANK YOU           "

## 2021-12-29 DIAGNOSIS — M54.16 SPINAL STENOSIS OF LUMBAR REGION WITH RADICULOPATHY: Primary | ICD-10-CM

## 2021-12-29 DIAGNOSIS — Q76.2 CONGENITAL SPONDYLOLYSIS, LUMBOSACRAL REGION: ICD-10-CM

## 2021-12-29 DIAGNOSIS — Z01.818 ENCOUNTER FOR PREADMISSION TESTING: ICD-10-CM

## 2021-12-29 DIAGNOSIS — M48.062 SPINAL STENOSIS OF LUMBAR REGION WITH NEUROGENIC CLAUDICATION: Primary | ICD-10-CM

## 2021-12-29 DIAGNOSIS — M48.061 SPINAL STENOSIS OF LUMBAR REGION WITH RADICULOPATHY: Primary | ICD-10-CM

## 2021-12-29 RX ORDER — CEFAZOLIN SODIUM 2 G/100ML
2 INJECTION, SOLUTION INTRAVENOUS ONCE
Status: CANCELLED | OUTPATIENT
Start: 2022-01-06 | End: 2021-12-29

## 2021-12-30 ENCOUNTER — ANESTHESIA EVENT (OUTPATIENT)
Dept: PAIN MEDICINE | Facility: HOSPITAL | Age: 61
End: 2021-12-30

## 2021-12-30 ENCOUNTER — HOSPITAL ENCOUNTER (OUTPATIENT)
Dept: GENERAL RADIOLOGY | Facility: HOSPITAL | Age: 61
Discharge: HOME | End: 2021-12-30

## 2021-12-30 ENCOUNTER — ANESTHESIA (OUTPATIENT)
Dept: PAIN MEDICINE | Facility: HOSPITAL | Age: 61
End: 2021-12-30

## 2021-12-30 ENCOUNTER — HOSPITAL ENCOUNTER (OUTPATIENT)
Dept: PAIN MEDICINE | Facility: HOSPITAL | Age: 61
Discharge: HOME | End: 2021-12-30

## 2021-12-30 VITALS
WEIGHT: 220 LBS | RESPIRATION RATE: 16 BRPM | BODY MASS INDEX: 34.53 KG/M2 | TEMPERATURE: 97.1 F | HEART RATE: 55 BPM | OXYGEN SATURATION: 97 % | DIASTOLIC BLOOD PRESSURE: 104 MMHG | HEIGHT: 67 IN | SYSTOLIC BLOOD PRESSURE: 178 MMHG

## 2021-12-30 DIAGNOSIS — M48.061 SPINAL STENOSIS OF LUMBAR REGION WITH RADICULOPATHY: ICD-10-CM

## 2021-12-30 DIAGNOSIS — R52 PAIN: ICD-10-CM

## 2021-12-30 DIAGNOSIS — M54.16 SPINAL STENOSIS OF LUMBAR REGION WITH RADICULOPATHY: ICD-10-CM

## 2021-12-30 PROCEDURE — 25010000002 DEXAMETHASONE PER 1 MG: Performed by: ANESTHESIOLOGY

## 2021-12-30 PROCEDURE — 77003 FLUOROGUIDE FOR SPINE INJECT: CPT

## 2021-12-30 PROCEDURE — 0 IOPAMIDOL 41 % SOLUTION: Performed by: ANESTHESIOLOGY

## 2021-12-30 RX ORDER — DEXAMETHASONE SODIUM PHOSPHATE 4 MG/ML
INJECTION, SOLUTION INTRA-ARTICULAR; INTRALESIONAL; INTRAMUSCULAR; INTRAVENOUS; SOFT TISSUE
Status: COMPLETED | OUTPATIENT
Start: 2021-12-30 | End: 2021-12-30

## 2021-12-30 RX ORDER — LIDOCAINE HYDROCHLORIDE 10 MG/ML
1 INJECTION, SOLUTION INFILTRATION; PERINEURAL ONCE AS NEEDED
Status: DISCONTINUED | OUTPATIENT
Start: 2021-12-30 | End: 2021-12-31 | Stop reason: HOSPADM

## 2021-12-30 RX ORDER — BUPIVACAINE HYDROCHLORIDE AND EPINEPHRINE 2.5; 5 MG/ML; UG/ML
10 INJECTION, SOLUTION EPIDURAL; INFILTRATION; INTRACAUDAL; PERINEURAL ONCE
Status: COMPLETED | OUTPATIENT
Start: 2021-12-30 | End: 2021-12-30

## 2021-12-30 RX ORDER — SODIUM CHLORIDE 0.9 % (FLUSH) 0.9 %
1-10 SYRINGE (ML) INJECTION AS NEEDED
Status: DISCONTINUED | OUTPATIENT
Start: 2021-12-30 | End: 2021-12-31 | Stop reason: HOSPADM

## 2021-12-30 RX ORDER — FENTANYL CITRATE 50 UG/ML
50 INJECTION, SOLUTION INTRAMUSCULAR; INTRAVENOUS AS NEEDED
Status: DISCONTINUED | OUTPATIENT
Start: 2021-12-30 | End: 2021-12-31 | Stop reason: HOSPADM

## 2021-12-30 RX ORDER — MIDAZOLAM HYDROCHLORIDE 1 MG/ML
1 INJECTION INTRAMUSCULAR; INTRAVENOUS AS NEEDED
Status: DISCONTINUED | OUTPATIENT
Start: 2021-12-30 | End: 2021-12-31 | Stop reason: HOSPADM

## 2021-12-30 RX ORDER — DEXAMETHASONE SODIUM PHOSPHATE 10 MG/ML
10 INJECTION, SOLUTION INTRAMUSCULAR; INTRAVENOUS ONCE
Status: COMPLETED | OUTPATIENT
Start: 2021-12-30 | End: 2021-12-30

## 2021-12-30 RX ADMIN — IOPAMIDOL 10 ML: 408 INJECTION, SOLUTION INTRATHECAL at 08:46

## 2021-12-30 RX ADMIN — BUPIVACAINE HYDROCHLORIDE AND EPINEPHRINE BITARTRATE 10 ML: 2.5; .005 INJECTION, SOLUTION EPIDURAL; INFILTRATION; INTRACAUDAL; PERINEURAL at 08:46

## 2021-12-30 RX ADMIN — DEXAMETHASONE SODIUM PHOSPHATE 10 MG: 4 INJECTION INTRA-ARTICULAR; INTRALESIONAL; INTRAMUSCULAR; INTRAVENOUS; SOFT TISSUE at 08:36

## 2021-12-30 RX ADMIN — DEXAMETHASONE SODIUM PHOSPHATE 10 MG: 10 INJECTION, SOLUTION INTRAMUSCULAR; INTRAVENOUS at 08:46

## 2021-12-30 NOTE — ANESTHESIA PROCEDURE NOTES
PAIN Epidural block    Pre-sedation assessment completed: 12/30/2021 8:38 AM    Patient reassessed immediately prior to procedure    Patient location during procedure: pain clinic  Start Time: 12/30/2021 8:38 AM  Stop Time: 12/30/2021 8:48 AM  Indication:at surgeon's request and procedure for pain  Performed By  Anesthesiologist: Eloy Singleton MD  Preanesthetic Checklist  Completed: patient identified, site marked, risks and benefits discussed, surgical consent, monitors and equipment checked, pre-op evaluation and timeout performed  Additional Notes  Post-Op Diagnosis Codes:     * Lumbar neuritis (M54.16)    Prep:  Pt Position:prone  Sterile Tech:sterile barrier, mask and gloves  Prep:chlorhexidine gluconate and isopropyl alcohol  Monitoring:blood pressure monitoring, continuous pulse oximetry and EKG  Procedure:  Approach: Right transforaminal.  Guidance: fluoroscopy  Location:lumbar  Level:L5-S1  Needle Gauge:20 G  Aspiration:negative  Test Dose:negative  Medications:  Isovue:2mL  Comments:Bupivacaine 0.25% with epinephrine 1/2 mL was also injected with injectate.  Right-sided transforaminal epidural steroid injections performed under paramedian fluoroscopy.  I then switched to full lateral and guided the needle into the posterior neuroforamen at the L5-S1 level.  There is loss resistance to injection.  Position was checked in the AP plane with 1 mL of contrast showing spread of contrast media.  I then injected steroid and local anesthetic.  He had no pain with injection he tolerated procedure well.  Post Assessment:  Pt Tolerance:patient tolerated the procedure well with no apparent complications  Complications:no

## 2021-12-30 NOTE — NURSING NOTE
Patient verbalizes understanding to take BP meds per Dr. Singleton as soon as he gets home and understands all discharge instructions.

## 2021-12-30 NOTE — INTERVAL H&P NOTE
"[facility]  H&P reviewed. No changes since last visit.  Patient states   50-75% improvement since the last procedure/injection.    Diagnosis     * Lumbar neuritis [M54.16]      Airway assessed since last visit. Airway class equals: 3.  He reports pretty good relief of his pain initially after the injection however it is coming back and his foot is \"on fire\".  Osman is asked us to perform a secondary injection to hopefully get him through the weekend.  He is planned have surgery on Monday.    "

## 2021-12-31 ENCOUNTER — DOCUMENTATION (OUTPATIENT)
Dept: NEUROSURGERY | Facility: CLINIC | Age: 61
End: 2021-12-31

## 2021-12-31 NOTE — PROGRESS NOTES
I spoke with him by telephone.  He got a second epidural block yesterday and it helped a bit.  He is heading for surgery this coming week on 1/6/2022.  He is having an lumbar decompression at L4-L5 and L5-S1 with interbody cages and/or posterior lateral fusion with pedicle screw fixation using the Twiceor robot at L4-L5 and S1.  He has severe right buttock and leg pain from his isthmic L5-S1 spondylolisthesis and spinal stenosis at L4-L5.  He is weak in his right leg.  The left leg is fine.  He only has a modest amount of back pain.    He understands that the goal of surgery is relief of radiating leg pain, improvement of numbness, tingling, and weakness, and reduction in overall low back pain. The risks include, but are not limited to, infection, hemorrhage requiring transfusion or reoperation, CSF leak requiring reoperation, incomplete relief of symptoms, psuedoarthrosis resulting in chronic low back pain, hardward problems requiring revision or removal, potential need for additional surgery in the future, stroke, paralysis, coma, and death. The patient agrees to proceed.

## 2022-01-03 ENCOUNTER — HOSPITAL ENCOUNTER (OUTPATIENT)
Dept: CT IMAGING | Facility: HOSPITAL | Age: 62
Discharge: HOME | End: 2022-01-03
Admitting: NEUROLOGICAL SURGERY

## 2022-01-03 ENCOUNTER — TELEPHONE (OUTPATIENT)
Dept: NEUROSURGERY | Facility: CLINIC | Age: 62
End: 2022-01-03

## 2022-01-03 DIAGNOSIS — M48.062 SPINAL STENOSIS OF LUMBAR REGION WITH NEUROGENIC CLAUDICATION: ICD-10-CM

## 2022-01-03 DIAGNOSIS — Q76.2 CONGENITAL SPONDYLOLYSIS, LUMBOSACRAL REGION: ICD-10-CM

## 2022-01-03 PROCEDURE — 72131 CT LUMBAR SPINE W/O DYE: CPT

## 2022-01-03 NOTE — TELEPHONE ENCOUNTER
----- Message from Luis Brewer MD sent at 12/31/2021  5:35 PM EST -----  I spoke with him on Friday.  He thought that his surgery was on Monday but I told him that it was on Thursday of next week.  Could you just check with him again on Monday when you get to the office.  He may have been referring to going to [facility].

## 2022-01-04 DIAGNOSIS — Z01.818 PRE-OP TESTING: Primary | ICD-10-CM

## 2022-01-05 ENCOUNTER — LAB (OUTPATIENT)
Dept: LAB | Facility: HOSPITAL | Age: 62
End: 2022-01-05

## 2022-01-05 ENCOUNTER — TELEPHONE (OUTPATIENT)
Dept: NEUROSURGERY | Facility: CLINIC | Age: 62
End: 2022-01-05

## 2022-01-05 DIAGNOSIS — Z01.818 PRE-OP TESTING: ICD-10-CM

## 2022-01-05 LAB — SARS-COV-2 ORF1AB RESP QL NAA+PROBE: NOT DETECTED

## 2022-01-05 PROCEDURE — U0004 COV-19 TEST NON-CDC HGH THRU: HCPCS

## 2022-01-05 RX ORDER — HYDROCODONE BITARTRATE AND ACETAMINOPHEN 5; 325 MG/1; MG/1
1 TABLET ORAL EVERY 6 HOURS PRN
COMMUNITY
End: 2022-01-10 | Stop reason: HOSPADM

## 2022-01-05 RX ORDER — PRAVASTATIN SODIUM 20 MG
20 TABLET ORAL DAILY
COMMUNITY
Start: 2021-11-14

## 2022-01-05 RX ORDER — HYDROCODONE BITARTRATE AND ACETAMINOPHEN 7.5; 325 MG/1; MG/1
1 TABLET ORAL EVERY 6 HOURS PRN
COMMUNITY
End: 2022-01-20

## 2022-01-05 RX ORDER — ONDANSETRON HYDROCHLORIDE 8 MG/1
8 TABLET, FILM COATED ORAL EVERY 8 HOURS PRN
COMMUNITY
Start: 2021-12-17

## 2022-01-05 NOTE — TELEPHONE ENCOUNTER
Pt was given surgery info over the phone on 2/22/21. He was to stop by the office on 1/5/22 to sign controlled substance agreement, get surgery letter and post op instruction. He did not come to the office to sign. He will require one at hospital.

## 2022-01-06 ENCOUNTER — ANESTHESIA (OUTPATIENT)
Dept: PERIOP | Facility: HOSPITAL | Age: 62
End: 2022-01-06

## 2022-01-06 ENCOUNTER — HOSPITAL ENCOUNTER (INPATIENT)
Facility: HOSPITAL | Age: 62
LOS: 4 days | Discharge: HOME | End: 2022-01-10
Attending: NEUROLOGICAL SURGERY | Admitting: NEUROLOGICAL SURGERY

## 2022-01-06 ENCOUNTER — APPOINTMENT (OUTPATIENT)
Dept: GENERAL RADIOLOGY | Facility: HOSPITAL | Age: 62
End: 2022-01-06

## 2022-01-06 ENCOUNTER — ANESTHESIA EVENT (OUTPATIENT)
Dept: PERIOP | Facility: HOSPITAL | Age: 62
End: 2022-01-06

## 2022-01-06 DIAGNOSIS — Z98.890 STATUS POST SURGERY: ICD-10-CM

## 2022-01-06 DIAGNOSIS — R33.8 ACUTE URINARY RETENTION: ICD-10-CM

## 2022-01-06 DIAGNOSIS — M48.062 SPINAL STENOSIS OF LUMBAR REGION WITH NEUROGENIC CLAUDICATION: ICD-10-CM

## 2022-01-06 DIAGNOSIS — Q76.2 CONGENITAL SPONDYLOLYSIS, LUMBOSACRAL REGION: ICD-10-CM

## 2022-01-06 LAB — GLUCOSE BLDC GLUCOMTR-MCNC: 100 MG/DL (ref 70–130)

## 2022-01-06 PROCEDURE — 0ST20ZZ RESECTION OF LUMBAR VERTEBRAL DISC, OPEN APPROACH: ICD-10-PCS | Performed by: NEUROLOGICAL SURGERY

## 2022-01-06 PROCEDURE — 0 CEFAZOLIN IN DEXTROSE 2-4 GM/100ML-% SOLUTION: Performed by: NEUROLOGICAL SURGERY

## 2022-01-06 PROCEDURE — 22842 INSERT SPINE FIXATION DEVICE: CPT | Performed by: SPECIALIST/TECHNOLOGIST, OTHER

## 2022-01-06 PROCEDURE — 63053 LAM FACTC/FRMT ARTHRD LUM EA: CPT | Performed by: NEUROLOGICAL SURGERY

## 2022-01-06 PROCEDURE — C1713 ANCHOR/SCREW BN/BN,TIS/BN: HCPCS | Performed by: NEUROLOGICAL SURGERY

## 2022-01-06 PROCEDURE — 25010000002 ONDANSETRON PER 1 MG: Performed by: NURSE ANESTHETIST, CERTIFIED REGISTERED

## 2022-01-06 PROCEDURE — C1889 IMPLANT/INSERT DEVICE, NOC: HCPCS | Performed by: NEUROLOGICAL SURGERY

## 2022-01-06 PROCEDURE — 76000 FLUOROSCOPY <1 HR PHYS/QHP: CPT | Performed by: NEUROLOGICAL SURGERY

## 2022-01-06 PROCEDURE — 01NB0ZZ RELEASE LUMBAR NERVE, OPEN APPROACH: ICD-10-PCS | Performed by: NEUROLOGICAL SURGERY

## 2022-01-06 PROCEDURE — 82962 GLUCOSE BLOOD TEST: CPT

## 2022-01-06 PROCEDURE — 25010000002 HYDROMORPHONE PER 4 MG: Performed by: NURSE ANESTHETIST, CERTIFIED REGISTERED

## 2022-01-06 PROCEDURE — 22630 ARTHRD PST TQ 1NTRSPC LUM: CPT | Performed by: SPECIALIST/TECHNOLOGIST, OTHER

## 2022-01-06 PROCEDURE — 25010000002 FENTANYL CITRATE (PF) 50 MCG/ML SOLUTION: Performed by: ANESTHESIOLOGY

## 2022-01-06 PROCEDURE — 25010000002 FENTANYL CITRATE (PF) 50 MCG/ML SOLUTION: Performed by: NURSE ANESTHETIST, CERTIFIED REGISTERED

## 2022-01-06 PROCEDURE — 0SG00AJ FUSION OF LUMBAR VERTEBRAL JOINT WITH INTERBODY FUSION DEVICE, POSTERIOR APPROACH, ANTERIOR COLUMN, OPEN APPROACH: ICD-10-PCS | Performed by: NEUROLOGICAL SURGERY

## 2022-01-06 PROCEDURE — 25010000002 METHOCARBAMOL 1000 MG/10ML SOLUTION: Performed by: NEUROLOGICAL SURGERY

## 2022-01-06 PROCEDURE — 0 CEFAZOLIN PER 500 MG: Performed by: NEUROLOGICAL SURGERY

## 2022-01-06 PROCEDURE — 22632 ARTHRD PST TQ 1NTRSPC LM EA: CPT | Performed by: NEUROLOGICAL SURGERY

## 2022-01-06 PROCEDURE — 25010000002 NEOSTIGMINE 5 MG/10ML SOLUTION: Performed by: NURSE ANESTHETIST, CERTIFIED REGISTERED

## 2022-01-06 PROCEDURE — 22630 ARTHRD PST TQ 1NTRSPC LUM: CPT | Performed by: NEUROLOGICAL SURGERY

## 2022-01-06 PROCEDURE — 63052 LAM FACETC/FRMT ARTHRD LUM 1: CPT | Performed by: NEUROLOGICAL SURGERY

## 2022-01-06 PROCEDURE — 25010000002 PROPOFOL 10 MG/ML EMULSION: Performed by: NURSE ANESTHETIST, CERTIFIED REGISTERED

## 2022-01-06 PROCEDURE — 63052 LAM FACETC/FRMT ARTHRD LUM 1: CPT | Performed by: SPECIALIST/TECHNOLOGIST, OTHER

## 2022-01-06 PROCEDURE — 22853 INSJ BIOMECHANICAL DEVICE: CPT | Performed by: SPECIALIST/TECHNOLOGIST, OTHER

## 2022-01-06 PROCEDURE — 0 HYDROMORPHONE HCL PF 50 MG/5ML SOLUTION: Performed by: NEUROLOGICAL SURGERY

## 2022-01-06 PROCEDURE — 63053 LAM FACTC/FRMT ARTHRD LUM EA: CPT | Performed by: SPECIALIST/TECHNOLOGIST, OTHER

## 2022-01-06 PROCEDURE — 22853 INSJ BIOMECHANICAL DEVICE: CPT | Performed by: NEUROLOGICAL SURGERY

## 2022-01-06 PROCEDURE — 22632 ARTHRD PST TQ 1NTRSPC LM EA: CPT | Performed by: SPECIALIST/TECHNOLOGIST, OTHER

## 2022-01-06 PROCEDURE — 25010000002 MIDAZOLAM PER 1 MG: Performed by: ANESTHESIOLOGY

## 2022-01-06 PROCEDURE — 25010000002 DEXAMETHASONE PER 1 MG: Performed by: NURSE ANESTHETIST, CERTIFIED REGISTERED

## 2022-01-06 PROCEDURE — 25010000002 CEFTRIAXONE PER 250 MG: Performed by: NEUROLOGICAL SURGERY

## 2022-01-06 PROCEDURE — 22842 INSERT SPINE FIXATION DEVICE: CPT | Performed by: NEUROLOGICAL SURGERY

## 2022-01-06 PROCEDURE — 0ST40ZZ RESECTION OF LUMBOSACRAL DISC, OPEN APPROACH: ICD-10-PCS | Performed by: NEUROLOGICAL SURGERY

## 2022-01-06 PROCEDURE — 72100 X-RAY EXAM L-S SPINE 2/3 VWS: CPT | Performed by: NEUROLOGICAL SURGERY

## 2022-01-06 DEVICE — SPACER 84332410 ADAPTIX 24MM X 10MM
Type: IMPLANTABLE DEVICE | Site: BACK | Status: FUNCTIONAL
Brand: ADAPTIX™ INTERBODY SYSTEM WITH TITAN NANOLOCK™ SURFACE TECHNOLOGY

## 2022-01-06 DEVICE — PUTTY DBF GRAFTON 3CC: Type: IMPLANTABLE DEVICE | Site: BACK | Status: FUNCTIONAL

## 2022-01-06 DEVICE — ROD 1475501050 4.75 CCM NS CURV 50MM
Type: IMPLANTABLE DEVICE | Site: BACK | Status: FUNCTIONAL
Brand: CD HORIZON® SPINAL SYSTEM

## 2022-01-06 DEVICE — SSC BONE WAX
Type: IMPLANTABLE DEVICE | Site: BACK | Status: FUNCTIONAL
Brand: SSC BONE WAX

## 2022-01-06 DEVICE — PUTTY DBM GRAFTON 6CC: Type: IMPLANTABLE DEVICE | Site: BACK | Status: FUNCTIONAL

## 2022-01-06 DEVICE — CROSSLINK 5442132 4.75 TI NS MLT 32-37MM
Type: IMPLANTABLE DEVICE | Site: BACK | Status: FUNCTIONAL
Brand: CD HORIZON® SPINAL SYSTEM

## 2022-01-06 DEVICE — FLOSEAL HEMOSTATIC MATRIX, 10ML
Type: IMPLANTABLE DEVICE | Site: BACK | Status: FUNCTIONAL
Brand: FLOSEAL HEMOSTATIC MATRIX

## 2022-01-06 DEVICE — SCREW 54840045540 4.75 ATS MAS 5.5X40
Type: IMPLANTABLE DEVICE | Site: BACK | Status: FUNCTIONAL
Brand: CD HORIZON® SPINAL SYSTEM

## 2022-01-06 RX ORDER — FENTANYL CITRATE 50 UG/ML
100 INJECTION, SOLUTION INTRAMUSCULAR; INTRAVENOUS
Status: DISCONTINUED | OUTPATIENT
Start: 2022-01-06 | End: 2022-01-06 | Stop reason: HOSPADM

## 2022-01-06 RX ORDER — ONDANSETRON 2 MG/ML
4 INJECTION INTRAMUSCULAR; INTRAVENOUS ONCE AS NEEDED
Status: DISCONTINUED | OUTPATIENT
Start: 2022-01-06 | End: 2022-01-06 | Stop reason: HOSPADM

## 2022-01-06 RX ORDER — GLYCOPYRROLATE 0.2 MG/ML
INJECTION INTRAMUSCULAR; INTRAVENOUS AS NEEDED
Status: DISCONTINUED | OUTPATIENT
Start: 2022-01-06 | End: 2022-01-06 | Stop reason: SURG

## 2022-01-06 RX ORDER — CEFAZOLIN SODIUM 2 G/100ML
2 INJECTION, SOLUTION INTRAVENOUS ONCE
Status: DISCONTINUED | OUTPATIENT
Start: 2022-01-06 | End: 2022-01-06 | Stop reason: HOSPADM

## 2022-01-06 RX ORDER — FENTANYL CITRATE 50 UG/ML
50 INJECTION, SOLUTION INTRAMUSCULAR; INTRAVENOUS
Status: DISCONTINUED | OUTPATIENT
Start: 2022-01-06 | End: 2022-01-06 | Stop reason: HOSPADM

## 2022-01-06 RX ORDER — ACETAMINOPHEN 325 MG/1
650 TABLET ORAL EVERY 4 HOURS PRN
Status: DISCONTINUED | OUTPATIENT
Start: 2022-01-06 | End: 2022-01-10 | Stop reason: HOSPADM

## 2022-01-06 RX ORDER — CEFAZOLIN SODIUM 2 G/100ML
2 INJECTION, SOLUTION INTRAVENOUS EVERY 8 HOURS
Status: COMPLETED | OUTPATIENT
Start: 2022-01-06 | End: 2022-01-07

## 2022-01-06 RX ORDER — EPHEDRINE SULFATE 50 MG/ML
5 INJECTION, SOLUTION INTRAVENOUS ONCE AS NEEDED
Status: DISCONTINUED | OUTPATIENT
Start: 2022-01-06 | End: 2022-01-06 | Stop reason: HOSPADM

## 2022-01-06 RX ORDER — DIPHENHYDRAMINE HYDROCHLORIDE 50 MG/ML
12.5 INJECTION INTRAMUSCULAR; INTRAVENOUS
Status: DISCONTINUED | OUTPATIENT
Start: 2022-01-06 | End: 2022-01-06 | Stop reason: HOSPADM

## 2022-01-06 RX ORDER — SODIUM CHLORIDE 0.9 % (FLUSH) 0.9 %
3 SYRINGE (ML) INJECTION EVERY 12 HOURS SCHEDULED
Status: DISCONTINUED | OUTPATIENT
Start: 2022-01-06 | End: 2022-01-06 | Stop reason: HOSPADM

## 2022-01-06 RX ORDER — HYDROMORPHONE HYDROCHLORIDE 1 MG/ML
0.5 INJECTION, SOLUTION INTRAMUSCULAR; INTRAVENOUS; SUBCUTANEOUS
Status: DISCONTINUED | OUTPATIENT
Start: 2022-01-06 | End: 2022-01-06 | Stop reason: HOSPADM

## 2022-01-06 RX ORDER — LIDOCAINE HYDROCHLORIDE 10 MG/ML
0.5 INJECTION, SOLUTION EPIDURAL; INFILTRATION; INTRACAUDAL; PERINEURAL ONCE AS NEEDED
Status: DISCONTINUED | OUTPATIENT
Start: 2022-01-06 | End: 2022-01-06 | Stop reason: HOSPADM

## 2022-01-06 RX ORDER — PROMETHAZINE HYDROCHLORIDE 25 MG/1
25 TABLET ORAL ONCE AS NEEDED
Status: DISCONTINUED | OUTPATIENT
Start: 2022-01-06 | End: 2022-01-06 | Stop reason: HOSPADM

## 2022-01-06 RX ORDER — DIAZEPAM 5 MG/1
5 TABLET ORAL EVERY 6 HOURS PRN
Status: DISCONTINUED | OUTPATIENT
Start: 2022-01-06 | End: 2022-01-10 | Stop reason: HOSPADM

## 2022-01-06 RX ORDER — NEOSTIGMINE METHYLSULFATE 0.5 MG/ML
INJECTION, SOLUTION INTRAVENOUS AS NEEDED
Status: DISCONTINUED | OUTPATIENT
Start: 2022-01-06 | End: 2022-01-06 | Stop reason: SURG

## 2022-01-06 RX ORDER — SODIUM CHLORIDE 0.9 % (FLUSH) 0.9 %
10 SYRINGE (ML) INJECTION EVERY 12 HOURS SCHEDULED
Status: DISCONTINUED | OUTPATIENT
Start: 2022-01-06 | End: 2022-01-10 | Stop reason: HOSPADM

## 2022-01-06 RX ORDER — HYDROCODONE BITARTRATE AND ACETAMINOPHEN 7.5; 325 MG/1; MG/1
1 TABLET ORAL ONCE AS NEEDED
Status: DISCONTINUED | OUTPATIENT
Start: 2022-01-06 | End: 2022-01-06 | Stop reason: HOSPADM

## 2022-01-06 RX ORDER — FLUMAZENIL 0.1 MG/ML
0.2 INJECTION INTRAVENOUS AS NEEDED
Status: DISCONTINUED | OUTPATIENT
Start: 2022-01-06 | End: 2022-01-06 | Stop reason: HOSPADM

## 2022-01-06 RX ORDER — HYDROMORPHONE HCL IN 0.9% NACL 10 MG/50ML
PATIENT CONTROLLED ANALGESIA SYRINGE INTRAVENOUS CONTINUOUS
Status: DISPENSED | OUTPATIENT
Start: 2022-01-06 | End: 2022-01-09

## 2022-01-06 RX ORDER — LABETALOL HYDROCHLORIDE 5 MG/ML
5 INJECTION, SOLUTION INTRAVENOUS
Status: DISCONTINUED | OUTPATIENT
Start: 2022-01-06 | End: 2022-01-06 | Stop reason: HOSPADM

## 2022-01-06 RX ORDER — METOPROLOL SUCCINATE 25 MG/1
25 TABLET, EXTENDED RELEASE ORAL
Status: DISCONTINUED | OUTPATIENT
Start: 2022-01-06 | End: 2022-01-10 | Stop reason: HOSPADM

## 2022-01-06 RX ORDER — HYDROMORPHONE HCL 110MG/55ML
PATIENT CONTROLLED ANALGESIA SYRINGE INTRAVENOUS AS NEEDED
Status: DISCONTINUED | OUTPATIENT
Start: 2022-01-06 | End: 2022-01-06 | Stop reason: SURG

## 2022-01-06 RX ORDER — ONDANSETRON 2 MG/ML
4 INJECTION INTRAMUSCULAR; INTRAVENOUS EVERY 6 HOURS PRN
Status: DISCONTINUED | OUTPATIENT
Start: 2022-01-06 | End: 2022-01-10 | Stop reason: HOSPADM

## 2022-01-06 RX ORDER — HYDROCODONE BITARTRATE AND ACETAMINOPHEN 7.5; 325 MG/1; MG/1
1 TABLET ORAL EVERY 4 HOURS PRN
Status: DISCONTINUED | OUTPATIENT
Start: 2022-01-06 | End: 2022-01-10 | Stop reason: HOSPADM

## 2022-01-06 RX ORDER — HYDROCODONE BITARTRATE AND ACETAMINOPHEN 7.5; 325 MG/1; MG/1
2 TABLET ORAL EVERY 4 HOURS PRN
Status: DISCONTINUED | OUTPATIENT
Start: 2022-01-06 | End: 2022-01-10 | Stop reason: HOSPADM

## 2022-01-06 RX ORDER — MIDAZOLAM HYDROCHLORIDE 1 MG/ML
2 INJECTION INTRAMUSCULAR; INTRAVENOUS ONCE
Status: COMPLETED | OUTPATIENT
Start: 2022-01-06 | End: 2022-01-06

## 2022-01-06 RX ORDER — AMOXICILLIN 250 MG
1 CAPSULE ORAL NIGHTLY PRN
Status: DISCONTINUED | OUTPATIENT
Start: 2022-01-06 | End: 2022-01-10 | Stop reason: HOSPADM

## 2022-01-06 RX ORDER — ONDANSETRON 4 MG/1
4 TABLET, FILM COATED ORAL EVERY 6 HOURS PRN
Status: DISCONTINUED | OUTPATIENT
Start: 2022-01-06 | End: 2022-01-10 | Stop reason: HOSPADM

## 2022-01-06 RX ORDER — DEXAMETHASONE SODIUM PHOSPHATE 10 MG/ML
INJECTION INTRAMUSCULAR; INTRAVENOUS AS NEEDED
Status: DISCONTINUED | OUTPATIENT
Start: 2022-01-06 | End: 2022-01-06 | Stop reason: SURG

## 2022-01-06 RX ORDER — SODIUM CHLORIDE 0.9 % (FLUSH) 0.9 %
3-10 SYRINGE (ML) INJECTION AS NEEDED
Status: DISCONTINUED | OUTPATIENT
Start: 2022-01-06 | End: 2022-01-06 | Stop reason: HOSPADM

## 2022-01-06 RX ORDER — MIDAZOLAM HYDROCHLORIDE 1 MG/ML
1 INJECTION INTRAMUSCULAR; INTRAVENOUS
Status: DISCONTINUED | OUTPATIENT
Start: 2022-01-06 | End: 2022-01-06 | Stop reason: HOSPADM

## 2022-01-06 RX ORDER — LIDOCAINE HYDROCHLORIDE 20 MG/ML
INJECTION, SOLUTION INFILTRATION; PERINEURAL AS NEEDED
Status: DISCONTINUED | OUTPATIENT
Start: 2022-01-06 | End: 2022-01-06 | Stop reason: SURG

## 2022-01-06 RX ORDER — PROMETHAZINE HYDROCHLORIDE 25 MG/1
25 SUPPOSITORY RECTAL ONCE AS NEEDED
Status: DISCONTINUED | OUTPATIENT
Start: 2022-01-06 | End: 2022-01-06 | Stop reason: HOSPADM

## 2022-01-06 RX ORDER — SODIUM CHLORIDE, SODIUM LACTATE, POTASSIUM CHLORIDE, CALCIUM CHLORIDE 600; 310; 30; 20 MG/100ML; MG/100ML; MG/100ML; MG/100ML
9 INJECTION, SOLUTION INTRAVENOUS CONTINUOUS
Status: DISCONTINUED | OUTPATIENT
Start: 2022-01-06 | End: 2022-01-06

## 2022-01-06 RX ORDER — CYCLOBENZAPRINE HCL 10 MG
10 TABLET ORAL 3 TIMES DAILY PRN
Status: DISCONTINUED | OUTPATIENT
Start: 2022-01-06 | End: 2022-01-08

## 2022-01-06 RX ORDER — ONDANSETRON 2 MG/ML
INJECTION INTRAMUSCULAR; INTRAVENOUS AS NEEDED
Status: DISCONTINUED | OUTPATIENT
Start: 2022-01-06 | End: 2022-01-06 | Stop reason: SURG

## 2022-01-06 RX ORDER — BUPIVACAINE HYDROCHLORIDE AND EPINEPHRINE 2.5; 5 MG/ML; UG/ML
INJECTION, SOLUTION EPIDURAL; INFILTRATION; INTRACAUDAL; PERINEURAL AS NEEDED
Status: DISCONTINUED | OUTPATIENT
Start: 2022-01-06 | End: 2022-01-06 | Stop reason: HOSPADM

## 2022-01-06 RX ORDER — CEFAZOLIN SODIUM 2 G/100ML
2 INJECTION, SOLUTION INTRAVENOUS ONCE
Status: COMPLETED | OUTPATIENT
Start: 2022-01-06 | End: 2022-01-06

## 2022-01-06 RX ORDER — NALOXONE HCL 0.4 MG/ML
0.1 VIAL (ML) INJECTION
Status: DISCONTINUED | OUTPATIENT
Start: 2022-01-06 | End: 2022-01-10 | Stop reason: HOSPADM

## 2022-01-06 RX ORDER — METHOCARBAMOL 100 MG/ML
1000 INJECTION, SOLUTION INTRAMUSCULAR; INTRAVENOUS ONCE
Status: COMPLETED | OUTPATIENT
Start: 2022-01-06 | End: 2022-01-06

## 2022-01-06 RX ORDER — OXYCODONE AND ACETAMINOPHEN 7.5; 325 MG/1; MG/1
1 TABLET ORAL EVERY 4 HOURS PRN
Status: DISCONTINUED | OUTPATIENT
Start: 2022-01-06 | End: 2022-01-06 | Stop reason: HOSPADM

## 2022-01-06 RX ORDER — PROPOFOL 10 MG/ML
VIAL (ML) INTRAVENOUS AS NEEDED
Status: DISCONTINUED | OUTPATIENT
Start: 2022-01-06 | End: 2022-01-06 | Stop reason: SURG

## 2022-01-06 RX ORDER — DOCUSATE SODIUM 100 MG/1
100 CAPSULE, LIQUID FILLED ORAL 2 TIMES DAILY PRN
Status: DISCONTINUED | OUTPATIENT
Start: 2022-01-06 | End: 2022-01-08

## 2022-01-06 RX ORDER — FENTANYL CITRATE 50 UG/ML
50 INJECTION, SOLUTION INTRAMUSCULAR; INTRAVENOUS ONCE
Status: COMPLETED | OUTPATIENT
Start: 2022-01-06 | End: 2022-01-06

## 2022-01-06 RX ORDER — SODIUM CHLORIDE, SODIUM LACTATE, POTASSIUM CHLORIDE, CALCIUM CHLORIDE 600; 310; 30; 20 MG/100ML; MG/100ML; MG/100ML; MG/100ML
100 INJECTION, SOLUTION INTRAVENOUS CONTINUOUS
Status: DISCONTINUED | OUTPATIENT
Start: 2022-01-06 | End: 2022-01-07

## 2022-01-06 RX ORDER — IBUPROFEN 600 MG/1
600 TABLET ORAL ONCE AS NEEDED
Status: DISCONTINUED | OUTPATIENT
Start: 2022-01-06 | End: 2022-01-06 | Stop reason: HOSPADM

## 2022-01-06 RX ORDER — DIPHENHYDRAMINE HCL 25 MG
25 CAPSULE ORAL
Status: DISCONTINUED | OUTPATIENT
Start: 2022-01-06 | End: 2022-01-06 | Stop reason: HOSPADM

## 2022-01-06 RX ORDER — MONTELUKAST SODIUM 10 MG/1
10 TABLET ORAL DAILY
Status: DISCONTINUED | OUTPATIENT
Start: 2022-01-07 | End: 2022-01-10 | Stop reason: HOSPADM

## 2022-01-06 RX ORDER — NALOXONE HCL 0.4 MG/ML
0.2 VIAL (ML) INJECTION AS NEEDED
Status: DISCONTINUED | OUTPATIENT
Start: 2022-01-06 | End: 2022-01-06 | Stop reason: HOSPADM

## 2022-01-06 RX ORDER — SODIUM CHLORIDE 0.9 % (FLUSH) 0.9 %
10 SYRINGE (ML) INJECTION AS NEEDED
Status: DISCONTINUED | OUTPATIENT
Start: 2022-01-06 | End: 2022-01-10 | Stop reason: HOSPADM

## 2022-01-06 RX ORDER — ROCURONIUM BROMIDE 10 MG/ML
INJECTION, SOLUTION INTRAVENOUS AS NEEDED
Status: DISCONTINUED | OUTPATIENT
Start: 2022-01-06 | End: 2022-01-06 | Stop reason: SURG

## 2022-01-06 RX ORDER — HYDRALAZINE HYDROCHLORIDE 20 MG/ML
5 INJECTION INTRAMUSCULAR; INTRAVENOUS
Status: DISCONTINUED | OUTPATIENT
Start: 2022-01-06 | End: 2022-01-06 | Stop reason: HOSPADM

## 2022-01-06 RX ORDER — PRAVASTATIN SODIUM 20 MG
20 TABLET ORAL DAILY
Status: DISCONTINUED | OUTPATIENT
Start: 2022-01-07 | End: 2022-01-10 | Stop reason: HOSPADM

## 2022-01-06 RX ORDER — PANTOPRAZOLE SODIUM 40 MG/1
40 TABLET, DELAYED RELEASE ORAL DAILY
Status: DISCONTINUED | OUTPATIENT
Start: 2022-01-07 | End: 2022-01-10 | Stop reason: HOSPADM

## 2022-01-06 RX ADMIN — LIDOCAINE HYDROCHLORIDE 100 MG: 20 INJECTION, SOLUTION INFILTRATION; PERINEURAL at 08:10

## 2022-01-06 RX ADMIN — HYDROCODONE BITARTRATE AND ACETAMINOPHEN 2 TABLET: 7.5; 325 TABLET ORAL at 22:23

## 2022-01-06 RX ADMIN — LOSARTAN POTASSIUM: 50 TABLET, FILM COATED ORAL at 18:19

## 2022-01-06 RX ADMIN — HYDROMORPHONE HYDROCHLORIDE 0.5 MG: 1 INJECTION, SOLUTION INTRAMUSCULAR; INTRAVENOUS; SUBCUTANEOUS at 14:55

## 2022-01-06 RX ADMIN — ROCURONIUM BROMIDE 20 MG: 50 INJECTION INTRAVENOUS at 11:39

## 2022-01-06 RX ADMIN — SODIUM CHLORIDE, POTASSIUM CHLORIDE, SODIUM LACTATE AND CALCIUM CHLORIDE: 600; 310; 30; 20 INJECTION, SOLUTION INTRAVENOUS at 11:04

## 2022-01-06 RX ADMIN — HYDROMORPHONE HYDROCHLORIDE 0.5 MG: 2 INJECTION, SOLUTION INTRAMUSCULAR; INTRAVENOUS; SUBCUTANEOUS at 12:20

## 2022-01-06 RX ADMIN — PROPOFOL 20 MG: 10 INJECTION, EMULSION INTRAVENOUS at 14:10

## 2022-01-06 RX ADMIN — FENTANYL CITRATE 50 MCG: 50 INJECTION INTRAMUSCULAR; INTRAVENOUS at 07:55

## 2022-01-06 RX ADMIN — FENTANYL CITRATE 50 MCG: 0.05 INJECTION, SOLUTION INTRAMUSCULAR; INTRAVENOUS at 08:55

## 2022-01-06 RX ADMIN — HYDROMORPHONE HYDROCHLORIDE 0.5 MG: 2 INJECTION, SOLUTION INTRAMUSCULAR; INTRAVENOUS; SUBCUTANEOUS at 13:33

## 2022-01-06 RX ADMIN — CEFAZOLIN SODIUM 2 G: 2 INJECTION, SOLUTION INTRAVENOUS at 11:55

## 2022-01-06 RX ADMIN — ROCURONIUM BROMIDE 20 MG: 50 INJECTION INTRAVENOUS at 10:20

## 2022-01-06 RX ADMIN — FENTANYL CITRATE 50 MCG: 0.05 INJECTION, SOLUTION INTRAMUSCULAR; INTRAVENOUS at 13:42

## 2022-01-06 RX ADMIN — PROPOFOL 160 MG: 10 INJECTION, EMULSION INTRAVENOUS at 08:10

## 2022-01-06 RX ADMIN — SODIUM CHLORIDE, POTASSIUM CHLORIDE, SODIUM LACTATE AND CALCIUM CHLORIDE 100 ML/HR: 600; 310; 30; 20 INJECTION, SOLUTION INTRAVENOUS at 21:09

## 2022-01-06 RX ADMIN — FENTANYL CITRATE 50 MCG: 0.05 INJECTION, SOLUTION INTRAMUSCULAR; INTRAVENOUS at 14:02

## 2022-01-06 RX ADMIN — METHOCARBAMOL 1000 MG: 100 INJECTION INTRAMUSCULAR; INTRAVENOUS at 14:27

## 2022-01-06 RX ADMIN — ROCURONIUM BROMIDE 20 MG: 50 INJECTION INTRAVENOUS at 11:00

## 2022-01-06 RX ADMIN — MIDAZOLAM 2 MG: 1 INJECTION INTRAMUSCULAR; INTRAVENOUS at 07:55

## 2022-01-06 RX ADMIN — NEOSTIGMINE METHYLSULFATE 4 MG: 0.5 INJECTION INTRAVENOUS at 13:30

## 2022-01-06 RX ADMIN — FENTANYL CITRATE 50 MCG: 50 INJECTION INTRAMUSCULAR; INTRAVENOUS at 14:26

## 2022-01-06 RX ADMIN — CEFAZOLIN SODIUM 2 G: 2 INJECTION, SOLUTION INTRAVENOUS at 20:58

## 2022-01-06 RX ADMIN — ONDANSETRON 4 MG: 2 INJECTION INTRAMUSCULAR; INTRAVENOUS at 08:16

## 2022-01-06 RX ADMIN — HYDROMORPHONE HYDROCHLORIDE 0.5 MG: 1 INJECTION, SOLUTION INTRAMUSCULAR; INTRAVENOUS; SUBCUTANEOUS at 15:11

## 2022-01-06 RX ADMIN — GLYCOPYRROLATE 0.4 MG: 0.2 INJECTION INTRAMUSCULAR; INTRAVENOUS at 13:30

## 2022-01-06 RX ADMIN — SODIUM CHLORIDE, POTASSIUM CHLORIDE, SODIUM LACTATE AND CALCIUM CHLORIDE: 600; 310; 30; 20 INJECTION, SOLUTION INTRAVENOUS at 13:56

## 2022-01-06 RX ADMIN — HYDROMORPHONE HYDROCHLORIDE: 10 INJECTION, SOLUTION INTRAMUSCULAR; INTRAVENOUS; SUBCUTANEOUS at 15:21

## 2022-01-06 RX ADMIN — FENTANYL CITRATE 50 MCG: 0.05 INJECTION, SOLUTION INTRAMUSCULAR; INTRAVENOUS at 14:10

## 2022-01-06 RX ADMIN — SODIUM CHLORIDE, POTASSIUM CHLORIDE, SODIUM LACTATE AND CALCIUM CHLORIDE 9 ML/HR: 600; 310; 30; 20 INJECTION, SOLUTION INTRAVENOUS at 07:55

## 2022-01-06 RX ADMIN — HYDROMORPHONE HYDROCHLORIDE 0.5 MG: 2 INJECTION, SOLUTION INTRAMUSCULAR; INTRAVENOUS; SUBCUTANEOUS at 10:45

## 2022-01-06 RX ADMIN — ROCURONIUM BROMIDE 10 MG: 50 INJECTION INTRAVENOUS at 10:21

## 2022-01-06 RX ADMIN — DEXAMETHASONE SODIUM PHOSPHATE 10 MG: 10 INJECTION INTRAMUSCULAR; INTRAVENOUS at 08:16

## 2022-01-06 RX ADMIN — CEFAZOLIN SODIUM 2 G: 2 INJECTION, SOLUTION INTRAVENOUS at 07:55

## 2022-01-06 RX ADMIN — ROCURONIUM BROMIDE 50 MG: 50 INJECTION INTRAVENOUS at 08:10

## 2022-01-06 RX ADMIN — HYDROCODONE BITARTRATE AND ACETAMINOPHEN 2 TABLET: 7.5; 325 TABLET ORAL at 18:19

## 2022-01-06 RX ADMIN — SODIUM CHLORIDE, POTASSIUM CHLORIDE, SODIUM LACTATE AND CALCIUM CHLORIDE 100 ML/HR: 600; 310; 30; 20 INJECTION, SOLUTION INTRAVENOUS at 15:06

## 2022-01-06 NOTE — PROGRESS NOTES
An OTS LSO brace has been ordered due to diagnosis of lumbar stenosis with neurogenic claudication, right L4-5 disc herniation.  The purpose of the brace is to support weak muscles, stabilize and restrict movement of the trunk to aid in healing and pain relief of postop).

## 2022-01-06 NOTE — BRIEF OP NOTE
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]   Herniated disc right L4-5    Procedure/CPT® Codes:        Procedure(s):  Open lumbar decompression with posterior lumbar interbody fusion with cages and pedicle scew/susan fixation lumbar four/five, lumbar five/sacral one using the Mazor robot    Surgeon(s):  Luis Brewer MD    Anesthesia: General    Staff:   Circulator: Shwetha Patrick RN; Spurling, Shannon, RN  Scrub Person: Sharon Love; Michelle Song  Assistant: Socorro Seals CSA  Assistant: Socorro Seals CSA      Estimated Blood Loss: 900 mL    Urine Voided: 635 mL    Specimens:                None          Drains:   Closed/Suction Drain 1 Back Bulb (Active)       Urethral Catheter Silicone 16 Fr. (Active)       Findings: Lateral recess stenosis with superimposed right L4-5 herniated disc    Complications: None    Assistant: Socorro Seals CSA  was responsible for performing the following activities: Retraction, Suction, Irrigation, Suturing, Closing and Placing Dressing and their skilled assistance was necessary for the success of this case.    Luis Brewer MD     Date: 1/6/2022  Time: 13:44 EST

## 2022-01-06 NOTE — H&P
Encounter Information    Encounter Information    Department Encounter #   2021  9:40 PM  YVES BURLESON [id number]      Luis Brewer MD   Physician   Neurosurgery   Progress Notes       Signed   Date of Service:  21   Creation Time:  21              Signed        Expand AllCollapse All          Show:Clear all  [x]Manual[x]Template[x]Copied    Added by:  [x]Luis Brewer MD      [][facility] for details       [facility]     Progress Note     Patient Name: Christiano Barr  : 1960  MRN: [id number]  Primary Care Physician:  Daniel Johnson MD  Date of admission: 2021        Subjective      Subjective      Chief Complaint: Severe right buttock and leg pain with weakness from an L5-S1 isthmic spondylolisthesis     This patient has had about 9 to 10 days of severe back and right leg pain with weakness.  He has had 20 years of back pain and manageable right leg pain.  He was found to have some spinal stenosis at L4-L5 but probably the main issue was L5-S1 isthmic spondylolisthesis with bilateral pars defects.  He does have some mild weakness in his right quadricep and right tibialis anterior.  No bowel or bladder incontinence.  He is never really had any conservative treatment.  I had a long discussion with him about this.  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  On the other hand little doubtful that epidural blocks and time in steroids and gabapentin will be enough given the severity of his symptoms and the severity of the nerve root compression.  But only gets unreasonable to try the block and continue the steroids and gabapentin for now.  We will ask the pain clinic to do the block on Monday.  I have time of my schedule to  do a fusion on Friday if he still feels that he has severe enough pain and weakness to justify it.  But will give the conservative treatment a try.  We will also increase his gabapentin to 600 mg twice daily.  His left leg is fine.  He actually does not have much back pain.     Patient Reports see above     Review of Systems   HENT: Negative.    Eyes: Negative.    Respiratory: Negative.    Cardiovascular: Negative.    Gastrointestinal: Negative.    Endocrine: Negative.    Genitourinary: Negative.    Musculoskeletal: Positive for back pain.   Skin: Negative.    Allergic/Immunologic: Negative.    Neurological: Positive for weakness.   Hematological: Negative.    Psychiatric/Behavioral: Negative.    All other systems reviewed and are negative.              Objective      Objective      Vitals:   Temp:  [97.1 °F (36.2 °C)-98.4 °F (36.9 °C)] 97.6 °F (36.4 °C)  Heart Rate:  [42-52] 52  Resp:  [16] 16  BP: (108-132)/(52-77) 132/77     Physical Exam  HENT:      Head: Normocephalic and atraumatic.      Right Ear: External ear normal.      Left Ear: External ear normal.      Nose: Nose normal.      Mouth/Throat:      Mouth: Mucous membranes are moist.      Pharynx: Oropharynx is clear.   Eyes:      Extraocular Movements: Extraocular movements intact.      Pupils: Pupils are equal, round, and reactive to light.   Cardiovascular:      Rate and Rhythm: Normal rate.      Pulses: Normal pulses.   Pulmonary:      Effort: Pulmonary effort is normal.   Abdominal:      General: Abdomen is flat.   Musculoskeletal:         General: No swelling.      Cervical back: Normal range of motion.   Skin:     General: Skin is warm.   Neurological:      Mental Status: He is alert and oriented to person, place, and time.      Cranial Nerves: No cranial nerve deficit.      Sensory: No sensory deficit.      Motor: Weakness present.      Coordination: Coordination normal.      Gait: Gait normal.      Deep Tendon Reflexes: Reflexes normal.       Comments: 4-5 strength in his right quadricep and right tibialis anterior                           Result Review       Result Review:  I have personally reviewed the results from the time of this admission to 12/25/2021 16:06 EST and agree with these findings:  []?  Laboratory  []?  Microbiology  [x]?  Radiology  []?  EKG/Telemetry   []?  Cardiology/Vascular   []?  Pathology  []?  Old records  []?  Other:MRI LUMBAR SPINE WITHOUT CONTRAST     HISTORY: Back pain, right radiculopathy.     COMPARISON: None.     FINDINGS: There is moderate loss of disc height and mild-to-moderate  disc desiccation at L4-L5. The conus is at L1 and the caudal aspect of  the spinal cord appears unremarkable.     L1-L2: There is no evidence of disc bulge or herniation.     L2-L3: Mild facet degenerative disease is present bilaterally.     L3-L4: Mild facet degenerative disease is present bilaterally. A small  central disc osteophyte complex is present with no evidence of  herniation.     L4-L5: Moderate facet degenerative disease is present bilaterally. There  is a central disc osteophyte complex including a right paracentral disc  protrusion. When combined with prominent epidural fat anterior and  posterior to the thecal sac there is severe canal stenosis and narrowing  of the thecal sac. Mild foraminal stenosis is present bilaterally  secondary to extension of a small disc osteophyte complex into the  neural foramen.     L5-S1: Bilateral L5 pars defects are appreciated. There is a central  disc osteophyte complex resulting in mild canal stenosis. Again,  prominent epidural fat is identified concentrically narrowing the thecal  sac. Moderate foraminal stenosis is present on the right secondary to  extension of disc material into the neural foramen. This approaches the  right L5 nerve within the neural foramen.     Transitional anatomy is appreciated with partial sacralization of L5.     IMPRESSION:  1.  Note is made of transitional anatomy  with partial sacralization of  L5. Careful correlation prior to any intervention is required given the  presence of transitional anatomy.  2.  Bilateral L5 pars defects.  3.  Moderate foraminal stenosis on the right at L5-S1 secondary to  extension of disc material into the neural foramen.  4.  Epidural lipomatosis extending from mid body of L4 to the inferior  aspect of the spinal canal significantly narrowing the thecal sac at  L4-L5 and L5-S1. See above.     This report was finalized on 12/23/2021 2:38 PM by Dr. Yosvany Aguiar M.D.           Lab Results   Component Value Date     GLUCOSE 152 (H) 12/25/2021     BUN 29 (H) 12/25/2021     CREATININE 1.08 12/25/2021     EGFRIFNONA 70 12/25/2021     BCR 26.9 (H) 12/25/2021     K 4.2 12/25/2021     CO2 22.8 12/25/2021     CALCIUM 9.0 12/25/2021     ALBUMIN 4.50 12/22/2021     AST 20 12/22/2021     ALT 38 12/22/2021      WBC   Date Value Ref Range Status   12/25/2021 16.25 (H) 3.40 - 10.80 10*3/mm3 Final            RBC   Date Value Ref Range Status   12/25/2021 5.04 4.14 - 5.80 10*6/mm3 Final            Hemoglobin   Date Value Ref Range Status   12/25/2021 16.4 13.0 - 17.7 g/dL Final            Hematocrit   Date Value Ref Range Status   12/25/2021 48.0 37.5 - 51.0 % Final            MCV   Date Value Ref Range Status   12/25/2021 95.2 79.0 - 97.0 fL Final            MCH   Date Value Ref Range Status   12/25/2021 32.5 26.6 - 33.0 pg Final            MCHC   Date Value Ref Range Status   12/25/2021 34.2 31.5 - 35.7 g/dL Final            RDW   Date Value Ref Range Status   12/25/2021 12.5 12.3 - 15.4 % Final      RDW-SD   Date Value Ref Range Status   12/25/2021 43.6 37.0 - 54.0 fl Final            MPV   Date Value Ref Range Status   12/25/2021 9.3 6.0 - 12.0 fL Final            Platelets   Date Value Ref Range Status   12/25/2021 240 140 - 450 10*3/mm3 Final            Neutrophil %   Date Value Ref Range Status   12/22/2021 71.7 42.7 - 76.0 % Final            Lymphocyte %    Date Value Ref Range Status   12/22/2021 18.1 (L) 19.6 - 45.3 % Final            Monocyte %   Date Value Ref Range Status   12/22/2021 8.0 5.0 - 12.0 % Final            Eosinophil %   Date Value Ref Range Status   12/22/2021 0.2 (L) 0.3 - 6.2 % Final            Basophil %   Date Value Ref Range Status   12/22/2021 0.3 0.0 - 1.5 % Final            Immature Grans %   Date Value Ref Range Status   12/22/2021 1.7 (H) 0.0 - 0.5 % Final            Neutrophils, Absolute   Date Value Ref Range Status   12/22/2021 8.31 (H) 1.70 - 7.00 10*3/mm3 Final            Lymphocytes, Absolute   Date Value Ref Range Status   12/22/2021 2.10 0.70 - 3.10 10*3/mm3 Final            Monocytes, Absolute   Date Value Ref Range Status   12/22/2021 0.93 (H) 0.10 - 0.90 10*3/mm3 Final            Eosinophils, Absolute   Date Value Ref Range Status   12/22/2021 0.02 0.00 - 0.40 10*3/mm3 Final            Basophils, Absolute   Date Value Ref Range Status   12/22/2021 0.03 0.00 - 0.20 10*3/mm3 Final            Immature Grans, Absolute   Date Value Ref Range Status   12/22/2021 0.20 (H) 0.00 - 0.05 10*3/mm3 Final            nRBC   Date Value Ref Range Status   12/22/2021 0.0 0.0 - 0.2 /100 WBC Final         Most notable findings include:         Assessment/Plan      Assessment / Plan      Brief Patient Summary:  Christiano Barr is a 61 y.o. male who has about a 10-day history of severe right buttock and leg pain with weakness.  He has an L5-S1 isthmic spondylolisthesis with root compression.     Active Hospital Problems:       Active Hospital Problems     Diagnosis     • **Acute midline low back pain with right-sided sciatica     • GERD without esophagitis     • HTN (hypertension)     • Obesity (BMI 30-39.9)     • Spinal stenosis of lumbar region with radiculopathy     • Numbness and tingling of right leg     • Epidural lipomatosis        Plan: See above.  We will try an epidural block on Monday but if that does not work then we will have to  do a fusion from L4-S1.  I am prepared to maximize conservative treatment so we will also increase his gabapentin.  If he is better after the Monday block then he can go home with follow-up with me in the office.  If not then I will be able to do surgery on him until Friday of next week.        DVT prophylaxis:  Mechanical DVT prophylaxis orders are present.     CODE STATUS:    Level Of Support Discussed With: Patient  Code Status (Patient has no pulse and is not breathing): CPR (Attempt to Resuscitate)  Medical Interventions (Patient has pulse or is breathing): Full     Disposition:  I expect patient to be discharged as per the primary team.     Luis Brewer MD                                         ED to Hosp-Admission (Discharged) on 12/22/2021           ED to Hosp-Admission (Discharged) on 12/22/2021                Detailed Report                ROS Info            Note shared with patient          I described and recommended a lumbar decompression and fusion with posterior lumbar interbody fusion (PLIF) and open pedicle screw fixation at L4/5 and L5/S1.  The goal of surgery is relief of radiating leg pain, improvement of numbness, tingling, and weakness, and reduction in overall low back pain. The risks include, but are not limited to, infection, hemorrhage requiring transfusion or reoperation, CSF leak requiring reoperation, incomplete relief of symptoms, psuedoarthrosis resulting in chronic low back pain, hardward problems requiring revision or removal, potential need for additional surgery in the future, stroke, paralysis, coma, and death. The patient agrees to proceed.     Will proceed as above. Has progressive right leg pain and weakness.

## 2022-01-06 NOTE — ANESTHESIA POSTPROCEDURE EVALUATION
Patient: Christiano Barr    Procedure Summary     Date: 01/06/22 Room / Location: [facility] OR [address] [place] [address] MAIN OR    Anesthesia Start: 0800 Anesthesia Stop: 1421    Procedure: Open lumbar decompression with posterior lumbar interbody fusion with cages and pedicle scew/susan fixation lumbar four/five, lumbar five/sacral one using the [employer] robot (N/A Spine Lumbar) Diagnosis:       Spinal stenosis of lumbar region with neurogenic claudication      Congenital spondylolysis, lumbosacral region      (Spinal stenosis of lumbar region with neurogenic claudication [M48.062])      (Congenital spondylolysis, lumbosacral region [Q76.2])    Surgeons: Luis Brewer MD Provider: Christiano Mckeon MD    Anesthesia Type: general ASA Status: 3          Anesthesia Type: general    Vitals  Vitals Value Taken Time   /73 01/06/22 1501   Temp 37.7 °C (99.9 °F) 01/06/22 1415   Pulse 65 01/06/22 1512   Resp 16 01/06/22 1445   SpO2 97 % 01/06/22 1512   Vitals shown include unvalidated device data.        Post Anesthesia Care and Evaluation    Patient location during evaluation: PACU  Patient participation: complete - patient participated  Level of consciousness: awake and alert  Pain management: adequate  Airway patency: patent  Anesthetic complications: No anesthetic complications    Cardiovascular status: acceptable  Respiratory status: acceptable  Hydration status: acceptable    Comments: --------------------            01/06/22               1445     --------------------   BP:       116/71     Pulse:      60       Resp:       16       Temp:                SpO2:      97%      --------------------

## 2022-01-06 NOTE — OP NOTE
Preoperative diagnosis: 1.  L5-S1 isthmic spondylolisthesis with pars defect; 2.  L4-L5 spinal stenosis with right-sided neurogenic claudication    Postoperative diagnosis: Same as above; 3.  Superimposed right L4-5 herniated disc with inferior free fragment    Procedures performed: Open lumbar decompression L4/5, Crump procedure L5/S1, posterior lumbar interbody fusion with bilateral Adaptix cages at L4/5, L5/S1 with pedicle screw/susan/crosslink fixation L4, L5, S1 with Mazor robotic navigation    Surgeon: Osman    First Assistant: Socorro eSals  (She greatly assisted in the exposure, visualization of neural structures, control of bleeding, retraction, and closure of the incision. Her skilled assistance was necessary for the success of this case.)    Anesthesia: GET    EBL: 900 cc    Complications: none    Specimen sent: none    Drains: 7 mm flat SALIMA epidural drain    Findings: Severe lateral recess stenosis bilaterally L4/5, L5/S1 with superimposed right L4/5 herniated disc with inferior free fragment    Postoperative condition: good    Indications for the operation: The patient is a 61-year-old male with a fairly brief history of severe right buttock and leg pain after moving a heavy object. He was found to have L5-S1 spondylolisthesis as well as spinal stenosis at L4-L5 and some epidural lipomatosis. He is also weak in the right foot and because of the intractable pain and weakness and progressive deterioration he was brought to the operating room for 2 level decompression and fusion.         Informed consent: He understood that the goal of surgery is relief of radiating leg pain, improvement of numbness, tingling, and weakness, and reduction in overall low back pain. The risks include, but are not limited to, infection, hemorrhage requiring transfusion or reoperation, CSF leak requiring reoperation, incomplete relief of symptoms, psuedoarthrosis resulting in chronic low back pain, hardward problems requiring  revision or removal, potential need for additional surgery in the future, stroke, paralysis, coma, and death. The patient agrees to proceed.           Details of the operation:The patient was taken to the operating room and remained on his gurney in the supine position. After induction of endotracheal intubation, he got 2 g of Kefzol, as per the SCIP protocol. It was repeated during the case. A An catheter was placed. SCD's were placed. Venous access was secured. He was rolled over in the prone position on the radiolucent Koko spinal table. All pressure points were padded including the brachial plexus. We brought in the C-arm and marked out the incision from L3 to S2. The lumbar region was then prepped and draped in the usual sterile fashion, as was the C-arm. We did a surgical timeout. I injected 30 mL of 0.25% Marcaine with epinephrine in the paraspinous musculature bilaterally from L3 to S2. A linear incision was made from L3 to S2 with a #10 skin knife. Hemostasis was obtained with monopolar cautery. Dissection was taken all the way down to the lumbar fascia, which was divided to the left and to the right at L4, L5, and S1. A Elizondo periosteal was used to strip the paraspinous musculature out laterally. Self retaining retractors were placed. Initial x-ray showed the towel clips above L4-L5 and below L5-S1, but eventually I got two separate pictures with the needle at the L5-S1 disc space and at the L4-L5 disc space. The spinous processes were removed at L4, L5 and S1 with the Leksell rongeur. The microscope was draped and brought into the field. Its use was essential to the performance of microneurosurgical technique. Using the 5 mm round bur on the electric Midas drill, I did a central laminectomy at L4-L5 and L5-S1 all the way down to the ligamentum flavum. There was clearly an unstable pars defect bilaterally at L5 noted under the microscope. The drill was switched to the 3 mm matchstick and I completed a  laminectomy at both levels and essentially did a Crump procedure with complete removal of the pars defect at L5-S1 bilaterally. I did a complete facetectomy and foraminotomy bilaterally at L4-L5, exposing the L5, L4 roots and a Crump procedure also to expose the S1 roots. Along the way, I removed a lot of bony hypertrophy and ligamentous hypertrophy compressing the L5 nerve root to the lateral recesses, but I also found inferior free fragment that was compressing the L5 root on the right side, which had originated at L4-L5. I then incised the disc spaces at L4-L5 and L5-S1 and then under magnification I did a complete discectomy at both levels or near complete, removing more disc material and scraping the endplates and sizing out for a 24 x 10 mm Adaptix cage on both sides at both levels. Along the way, I harvested laminar autograft and I mixed it with some demineralized bone matrix and packed it into the anterior interspace at both L4-L5 and L5-S1. Then I placed a pair of 24 x 10 mm Adaptix cages bilaterally at L4-5 and bilaterally at L5-S1 using C-arm fluoroscopy for guidance. I was pleased with the position of the cages. I put a robotic pin on the left posterior superior iliac spine and attached the [employer] [facility] [employer] robot to the patient and then did our registration pictures with excellent accuracy. I then proceeded to place a pair of 4.5 x 40 mm pedicle screws at the L4 with excellent purchase and a pair of 5.5 x 40 mm screws bilaterally at S1 using a zero degree angle of entry. I put it on the right at L5 using 5.5 x 40, but on the left the pedicle cracked, and I decided not to put any screw in at all. X-ray was done afterwards confirmed placement of the screws in the appropriate position. We put 50 mm rods bilaterally and tightened them down to compression and a cross link of 38 mm size between L4 and L5. Antibiotic irrigation was used. Pulsavac irrigation was used. Floseal was used. A 7 mm flat Koko Falcon drain was  left in the epidural space and exited through a separate stab incision and secured to the skin with 2-0 silk. The fascia was re-approximated with 0-Vicryl interrupted suture. The subcutaneous layer was closed with 2-0 interrupted Vicryl suture. The skin was closed with a running 4-0 Vicryl subcuticular. Steri-Strips and a clean dry dressing were placed. The drain was secured to the skin with 2-0 silk. Sterile, clean, dry dressings were placed. The patient tolerated the procedure well and he was rolled over in the supine position, extubated and taken to the recovery room in satisfactory condition.

## 2022-01-06 NOTE — ANESTHESIA POSTPROCEDURE EVALUATION
Patient: Christiano Barr    Procedure Summary     Date: 01/06/22 Room / Location: [facility] OR [address] [place] [address] MAIN OR    Anesthesia Start: 0800 Anesthesia Stop: 1421    Procedure: Open lumbar decompression with posterior lumbar interbody fusion with cages and pedicle scew/susan fixation lumbar four/five, lumbar five/sacral one using the [employer] robot (N/A Spine Lumbar) Diagnosis:       Spinal stenosis of lumbar region with neurogenic claudication      Congenital spondylolysis, lumbosacral region      (Spinal stenosis of lumbar region with neurogenic claudication [M48.062])      (Congenital spondylolysis, lumbosacral region [Q76.2])    Surgeons: Luis Brewer MD Provider: Christiano Mckeon MD    Anesthesia Type: general ASA Status: 3          Anesthesia Type: general    Vitals  Vitals Value Taken Time   /73 01/06/22 1501   Temp 37.7 °C (99.9 °F) 01/06/22 1415   Pulse 65 01/06/22 1512   Resp 16 01/06/22 1445   SpO2 97 % 01/06/22 1512   Vitals shown include unvalidated device data.        Post Anesthesia Care and Evaluation    Patient location during evaluation: PACU  Patient participation: complete - patient participated  Level of consciousness: awake and alert  Pain management: adequate  Airway patency: patent  Anesthetic complications: No anesthetic complications    Cardiovascular status: acceptable  Respiratory status: acceptable  Hydration status: acceptable    Comments: --------------------            01/06/22               1445     --------------------   BP:       116/71     Pulse:      60       Resp:       16       Temp:                SpO2:      97%      --------------------

## 2022-01-06 NOTE — PERIOPERATIVE NURSING NOTE
Pt c/o some mild numbness and tingling in left foot postoperatively. Pt's pre-operative symptoms were in his R leg, no complaints of pain or numbness/tingling in this extremity. Pt can feel me touch his left foot but states it feels diminished compared to his right foot. Pt states touch sensation feels equal to him on bilateral calves and thighs. No discoloration present in either extremity, strong dorsi/plantarflexion, bilateral DP and PT pulses normal, good capillary refill. Pt also complaining of some discomfort in his lateral left hip, but it does not radiate. These symptoms reported to Azeb Westbrook, [facility] neurosurgery, she stated this could be related to nerve manipulation during procedure and advised to continue to monitor pt's symptoms and notify neurosurgery should pt's extremity change in color, should pulses become weaker, or numbness/tingling get worse.

## 2022-01-06 NOTE — PLAN OF CARE
Goal Outcome Evaluation:              Outcome Summary: Pt s/p lumbar decompression and fusion today. VSS, 2L NC. Numbness present in LLE, can feel touch. AxO x4. Pain controlled with PCA pump. F/C in place. SCDs on. Educated pt and family on use of call light and fall prevention, verbalized understanding. Plans to d/c pending. All needs met at this time.

## 2022-01-06 NOTE — ANESTHESIA PREPROCEDURE EVALUATION
Anesthesia Evaluation     Patient summary reviewed and Nursing notes reviewed   NPO Solid Status: > 8 hours             Airway   Mallampati: III  possible difficult intubation  Dental - normal exam     Pulmonary     breath sounds clear to auscultation  (+) sleep apnea,   Cardiovascular     ECG reviewed  Patient on routine beta blocker and Beta blocker given within 24 hours of surgery  Rhythm: regular  Rate: normal    (+) hypertension,       Neuro/Psych- negative ROS  GI/Hepatic/Renal/Endo    (+) obesity,  GERD,      Musculoskeletal     Abdominal    Substance History - negative use     OB/GYN negative ob/gyn ROS         Other   arthritis,                        Anesthesia Plan    ASA 3     general     intravenous induction

## 2022-01-06 NOTE — ANESTHESIA PROCEDURE NOTES
Airway  Urgency: elective    Date/Time: 1/6/2022 8:14 AM  Airway not difficult    General Information and Staff    Patient location during procedure: OR  Anesthesiologist: Christiano Mckeon MD  CRNA: Afshin Stewart CRNA    Indications and Patient Condition  Indications for airway management: airway protection    Preoxygenated: yes  MILS maintained throughout  Mask difficulty assessment: 1 - vent by mask    Final Airway Details  Final airway type: endotracheal airway      Successful airway: ETT  Cuffed: yes   Successful intubation technique: direct laryngoscopy  Facilitating devices/methods: intubating stylet  Endotracheal tube insertion site: oral  Blade: Alize  Blade size: 3  ETT size (mm): 7.5  Cormack-Lehane Classification: grade III - view of epiglottis only  Placement verified by: chest auscultation and capnometry   Measured from: gums  ETT/EBT to gums (cm): 24  Number of attempts at approach: 1  Assessment: lips, teeth, and gum same as pre-op and atraumatic intubation

## 2022-01-07 LAB
ANION GAP SERPL CALCULATED.3IONS-SCNC: 9.7 MMOL/L (ref 5–15)
BASOPHILS # BLD AUTO: 0.01 10*3/MM3 (ref 0–0.2)
BASOPHILS NFR BLD AUTO: 0.1 % (ref 0–1.5)
BUN SERPL-MCNC: 23 MG/DL (ref 8–23)
BUN/CREAT SERPL: 24.2 (ref 7–25)
CALCIUM SPEC-SCNC: 7.9 MG/DL (ref 8.6–10.5)
CHLORIDE SERPL-SCNC: 102 MMOL/L (ref 98–107)
CO2 SERPL-SCNC: 22.3 MMOL/L (ref 22–29)
CREAT SERPL-MCNC: 0.95 MG/DL (ref 0.76–1.27)
DEPRECATED RDW RBC AUTO: 40.9 FL (ref 37–54)
EOSINOPHIL # BLD AUTO: 0.02 10*3/MM3 (ref 0–0.4)
EOSINOPHIL NFR BLD AUTO: 0.1 % (ref 0.3–6.2)
ERYTHROCYTE [DISTWIDTH] IN BLOOD BY AUTOMATED COUNT: 12 % (ref 12.3–15.4)
GFR SERPL CREATININE-BSD FRML MDRD: 81 ML/MIN/1.73
GLUCOSE SERPL-MCNC: 148 MG/DL (ref 65–99)
HCT VFR BLD AUTO: 34.8 % (ref 37.5–51)
HGB BLD-MCNC: 12.4 G/DL (ref 13–17.7)
IMM GRANULOCYTES # BLD AUTO: 0.16 10*3/MM3 (ref 0–0.05)
IMM GRANULOCYTES NFR BLD AUTO: 1.2 % (ref 0–0.5)
LYMPHOCYTES # BLD AUTO: 0.82 10*3/MM3 (ref 0.7–3.1)
LYMPHOCYTES NFR BLD AUTO: 6 % (ref 19.6–45.3)
MCH RBC QN AUTO: 32.8 PG (ref 26.6–33)
MCHC RBC AUTO-ENTMCNC: 35.6 G/DL (ref 31.5–35.7)
MCV RBC AUTO: 92.1 FL (ref 79–97)
MONOCYTES # BLD AUTO: 0.88 10*3/MM3 (ref 0.1–0.9)
MONOCYTES NFR BLD AUTO: 6.5 % (ref 5–12)
NEUTROPHILS NFR BLD AUTO: 11.68 10*3/MM3 (ref 1.7–7)
NEUTROPHILS NFR BLD AUTO: 86.1 % (ref 42.7–76)
NRBC BLD AUTO-RTO: 0 /100 WBC (ref 0–0.2)
PLATELET # BLD AUTO: 137 10*3/MM3 (ref 140–450)
PMV BLD AUTO: 9 FL (ref 6–12)
POTASSIUM SERPL-SCNC: 3.8 MMOL/L (ref 3.5–5.2)
RBC # BLD AUTO: 3.78 10*6/MM3 (ref 4.14–5.8)
SODIUM SERPL-SCNC: 134 MMOL/L (ref 136–145)
WBC NRBC COR # BLD: 13.57 10*3/MM3 (ref 3.4–10.8)

## 2022-01-07 PROCEDURE — 97161 PT EVAL LOW COMPLEX 20 MIN: CPT

## 2022-01-07 PROCEDURE — 97110 THERAPEUTIC EXERCISES: CPT

## 2022-01-07 PROCEDURE — 85025 COMPLETE CBC W/AUTO DIFF WBC: CPT | Performed by: NEUROLOGICAL SURGERY

## 2022-01-07 PROCEDURE — 0 CEFAZOLIN IN DEXTROSE 2-4 GM/100ML-% SOLUTION: Performed by: NEUROLOGICAL SURGERY

## 2022-01-07 PROCEDURE — 80048 BASIC METABOLIC PNL TOTAL CA: CPT | Performed by: NEUROLOGICAL SURGERY

## 2022-01-07 PROCEDURE — 99024 POSTOP FOLLOW-UP VISIT: CPT | Performed by: NURSE PRACTITIONER

## 2022-01-07 RX ORDER — POLYETHYLENE GLYCOL 3350 17 G/17G
17 POWDER, FOR SOLUTION ORAL DAILY
Status: DISCONTINUED | OUTPATIENT
Start: 2022-01-07 | End: 2022-01-10 | Stop reason: HOSPADM

## 2022-01-07 RX ORDER — SODIUM CHLORIDE, SODIUM LACTATE, POTASSIUM CHLORIDE, CALCIUM CHLORIDE 600; 310; 30; 20 MG/100ML; MG/100ML; MG/100ML; MG/100ML
75 INJECTION, SOLUTION INTRAVENOUS CONTINUOUS
Status: DISCONTINUED | OUTPATIENT
Start: 2022-01-07 | End: 2022-01-08

## 2022-01-07 RX ORDER — DOCUSATE SODIUM 100 MG/1
100 CAPSULE, LIQUID FILLED ORAL 2 TIMES DAILY
Status: DISCONTINUED | OUTPATIENT
Start: 2022-01-07 | End: 2022-01-08 | Stop reason: SDUPTHER

## 2022-01-07 RX ORDER — BISACODYL 10 MG
10 SUPPOSITORY, RECTAL RECTAL DAILY PRN
Status: DISCONTINUED | OUTPATIENT
Start: 2022-01-07 | End: 2022-01-08

## 2022-01-07 RX ADMIN — HYDROCODONE BITARTRATE AND ACETAMINOPHEN 1 TABLET: 7.5; 325 TABLET ORAL at 20:58

## 2022-01-07 RX ADMIN — HYDROCODONE BITARTRATE AND ACETAMINOPHEN 1 TABLET: 7.5; 325 TABLET ORAL at 17:11

## 2022-01-07 RX ADMIN — CEFAZOLIN SODIUM 2 G: 2 INJECTION, SOLUTION INTRAVENOUS at 04:40

## 2022-01-07 RX ADMIN — CEFAZOLIN SODIUM 2 G: 2 INJECTION, SOLUTION INTRAVENOUS at 12:28

## 2022-01-07 RX ADMIN — HYDROCODONE BITARTRATE AND ACETAMINOPHEN 2 TABLET: 7.5; 325 TABLET ORAL at 13:01

## 2022-01-07 RX ADMIN — DOCUSATE SODIUM 100 MG: 100 CAPSULE, LIQUID FILLED ORAL at 20:58

## 2022-01-07 RX ADMIN — PANTOPRAZOLE SODIUM 40 MG: 40 TABLET, DELAYED RELEASE ORAL at 08:15

## 2022-01-07 RX ADMIN — POLYETHYLENE GLYCOL 3350 17 G: 17 POWDER, FOR SOLUTION ORAL at 17:11

## 2022-01-07 RX ADMIN — SODIUM CHLORIDE, POTASSIUM CHLORIDE, SODIUM LACTATE AND CALCIUM CHLORIDE 100 ML/HR: 600; 310; 30; 20 INJECTION, SOLUTION INTRAVENOUS at 07:51

## 2022-01-07 RX ADMIN — HYDROCODONE BITARTRATE AND ACETAMINOPHEN 2 TABLET: 7.5; 325 TABLET ORAL at 06:23

## 2022-01-07 RX ADMIN — MONTELUKAST SODIUM 10 MG: 10 TABLET, FILM COATED ORAL at 08:15

## 2022-01-07 RX ADMIN — SODIUM CHLORIDE, POTASSIUM CHLORIDE, SODIUM LACTATE AND CALCIUM CHLORIDE 75 ML/HR: 600; 310; 30; 20 INJECTION, SOLUTION INTRAVENOUS at 20:58

## 2022-01-07 RX ADMIN — HYDROCODONE BITARTRATE AND ACETAMINOPHEN 2 TABLET: 7.5; 325 TABLET ORAL at 02:28

## 2022-01-07 RX ADMIN — CYCLOBENZAPRINE 10 MG: 10 TABLET, FILM COATED ORAL at 20:58

## 2022-01-07 RX ADMIN — METOPROLOL SUCCINATE 25 MG: 25 TABLET, EXTENDED RELEASE ORAL at 08:15

## 2022-01-07 RX ADMIN — PRAVASTATIN SODIUM 20 MG: 20 TABLET ORAL at 08:15

## 2022-01-07 RX ADMIN — CYCLOBENZAPRINE 10 MG: 10 TABLET, FILM COATED ORAL at 13:02

## 2022-01-07 NOTE — CONSULTS
CONSULT NOTE    INTERNAL MEDICINE   [facility]       Patient Identification:  Name: Christiano Barr  Age: 61 y.o.  Sex: male  :  1960  MRN: [id number]             Date of Consultation:  22          Primary Care Physician: Daniel Johnson MD                               Requesting Physician: dr ross  Reason for Consultation:medical management    Chief Complaint:  61 year old gentleman who underwent lumbar spine surgery by dr ross earlier today; we are asked to see him regarding medical management; he has a history of hypertension, sleep apnea, obesity and prediabetes  History of Present Illness:   As above      Past Medical History:  Past Medical History:   Diagnosis Date   • Acid reflux    • Arthritis     OSTEO   • Bradycardia     40's-50's    • Congenital spondylolysis, lumbosacral region    • Hypertension    • Low back pain    • Peripheral neuropathy    • Pre-diabetes    • Risk factors for obstructive sleep apnea     YOAN 7   • Sleep apnea     instructed to bring cpap for surgery    • Spinal stenosis of lumbar region with radiculopathy      Past Surgical History:  Past Surgical History:   Procedure Laterality Date   • APPENDECTOMY     • INGUINAL HERNIA REPAIR Right    • TOTAL KNEE ARTHROPLASTY Left 2018    Procedure: LT TOTAL KNEE ARTHROPLASTY;  Surgeon: Ralph Panchal MD;  Location: [facility];  Service:       Home Meds:  Medications Prior to Admission   Medication Sig Dispense Refill Last Dose   • Glucosamine-Chondroit-Vit C-Mn (GLUCOSAMINE 1500 COMPLEX PO) Take  by mouth. Unknown dosage, OTC, takes 2 tabs daily   2022 at 0530   • HYDROcodone-acetaminophen (NORCO) 7.5-325 MG per tablet Take 1 tablet by mouth Every 6 (Six) Hours As Needed for Moderate Pain .      • losartan-hydrochlorothiazide (HYZAAR) 100-25 MG per tablet Take 1 tablet by mouth Daily.   Past Week at Unknown time   • metoprolol succinate XL (TOPROL-XL) 25 MG 24 hr tablet Take 1  "tablet by mouth Daily. 30 tablet 0 1/6/2022 at 0530   • montelukast (SINGULAIR) 10 MG tablet Take 10 mg by mouth Daily.   1/6/2022 at 0530   • ondansetron (ZOFRAN) 8 MG tablet Take 8 mg by mouth Every 8 (Eight) Hours As Needed.   Past Month at Unknown time   • pantoprazole (PROTONIX) 40 MG EC tablet Take 40 mg by mouth Daily.   1/6/2022 at 0530   • pravastatin (PRAVACHOL) 20 MG tablet Take 20 mg by mouth Daily.   1/6/2022 at 0530   • sennosides-docusate (PERICOLACE) 8.6-50 MG per tablet Take 2 tablets by mouth 2 (Two) Times a Day. 30 tablet 0 Past Week at Unknown time   • traMADol-acetaminophen (ULTRACET) 37.5-325 MG per tablet Take 1 tablet by mouth Every 6 (Six) Hours As Needed for Moderate Pain .   Past Month at Unknown time   • cyclobenzaprine (FLEXERIL) 10 MG tablet Take 1 tablet by mouth 3 (Three) Times a Day. 30 tablet 0    • dexamethasone (DECADRON) 1 MG tablet 2 \"3 days, then 3 for 3 days, then 2 for 3 days, then 1 for 3 days, then stop. 30 tablet 0    • HYDROcodone-acetaminophen (NORCO) 5-325 MG per tablet Take 1 tablet by mouth Every 6 (Six) Hours As Needed.        Current Meds:     Current Facility-Administered Medications:   •  acetaminophen (TYLENOL) tablet 650 mg, 650 mg, Oral, Q4H PRN, Luis Brewer MD  •  ceFAZolin in dextrose (ANCEF) IVPB solution 2 g, 2 g, Intravenous, Q8H, Luis Brewer MD  •  cyclobenzaprine (FLEXERIL) tablet 10 mg, 10 mg, Oral, TID PRN, Luis Brewer MD  •  diazePAM (VALIUM) tablet 5 mg, 5 mg, Oral, Q6H PRN, Luis Brewer MD  •  docusate sodium (COLACE) capsule 100 mg, 100 mg, Oral, BID PRN, Luis Brewer MD  •  HYDROcodone-acetaminophen (NORCO) 7.5-325 MG per tablet 1 tablet, 1 tablet, Oral, Q4H PRN, Luis Brewer MD  •  HYDROcodone-acetaminophen (NORCO) 7.5-325 MG per tablet 2 tablet, 2 tablet, Oral, Q4H PRN, Luis Brewer MD, 2 tablet at 01/06/22 1819  •  HYDROmorphone (DILAUDID) PCA 0.2 mg/ml 50 mL syringe, , Intravenous, Continuous, " Luis Brewer MD, New Syringe/Cartridge at 01/06/22 1521  •  lactated ringers infusion, 100 mL/hr, Intravenous, Continuous, Luis Brewer MD, Last Rate: 100 mL/hr at 01/06/22 1506, 100 mL/hr at 01/06/22 1506  •  losartan (COZAAR) 100 mg, hydroCHLOROthiazide (HYDRODIURIL) 25 mg for HYZAAR 100-25, , Oral, Daily, Luis Brewer MD, Given at 01/06/22 1819  •  metoprolol succinate XL (TOPROL-XL) 24 hr tablet 25 mg, 25 mg, Oral, Q24H, Luis Brewer MD  •  [START ON 1/7/2022] montelukast (SINGULAIR) tablet 10 mg, 10 mg, Oral, Daily, Luis Brewer MD  •  naloxone (NARCAN) injection 0.1 mg, 0.1 mg, Intravenous, Q5 Min PRN, Luis Brewer MD  •  ondansetron (ZOFRAN) tablet 4 mg, 4 mg, Oral, Q6H PRN **OR** ondansetron (ZOFRAN) injection 4 mg, 4 mg, Intravenous, Q6H PRN, Luis Brewer MD  •  [START ON 1/7/2022] pantoprazole (PROTONIX) EC tablet 40 mg, 40 mg, Oral, Daily, Luis Brewer MD  •  [START ON 1/7/2022] pravastatin (PRAVACHOL) tablet 20 mg, 20 mg, Oral, Daily, Luis Brewer MD  •  sennosides-docusate (PERICOLACE) 8.6-50 MG per tablet 1 tablet, 1 tablet, Oral, Nightly PRN, Luis Brewer MD  •  sodium chloride 0.9 % flush 10 mL, 10 mL, Intravenous, Q12H, Luis Brewer MD  •  sodium chloride 0.9 % flush 10 mL, 10 mL, Intravenous, PRN, Luis Brewer MD  Allergies:  No Known Allergies  Social History:   Social History     Socioeconomic History   • Marital status:    Tobacco Use   • Smoking status: Never Smoker   • Smokeless tobacco: Never Used   Vaping Use   • Vaping Use: Never used   Substance and Sexual Activity   • Alcohol use: Yes     Alcohol/week: 8.0 standard drinks     Types: 8 Cans of beer per week     Comment: 3-4 beers a day    • Drug use: No   • Sexual activity: Defer     Family History:  Family History   Problem Relation Age of Onset   • Malig Hyperthermia Neg Hx           Review of Systems  See history of present illness and past medical history.   "Patient denies headache, dizziness, syncope, falls, trauma, change in vision, change in hearing, change in taste, changes in weight, changes in appetite, focal weakness, numbness, or paresthesia.  Patient denies chest pain, palpitations, dyspnea, orthopnea, PND, cough, sinus pressure, rhinorrhea, epistaxis, hemoptysis, nausea, vomiting,hematemesis, diarrhea, constipation or hematchezia.  Denies cold or heat intolerance, polydipsia, polyuria, polyphagia. Denies hematuria, pyuria, dysuria, hesitancy, frequency or urgency. Denies consumption of raw and under cooked meats foods or change in water source.  Denies fever, chills, sweats, night sweats.  Denies missing any routine medications. Remainder of ROS is negative.      Vitals:   /57 (BP Location: Right arm, Patient Position: Lying)   Pulse 58   Temp 97.4 °F (36.3 °C) (Oral)   Resp 16   Ht 170.2 cm (67\")   Wt 96.3 kg (212 lb 4.9 oz)   SpO2 96%   BMI 33.25 kg/m²   I/O:     Intake/Output Summary (Last 24 hours) at 1/6/2022 1917  Last data filed at 1/6/2022 1740  Gross per 24 hour   Intake 2900 ml   Output 2090 ml   Net 810 ml     Exam:  General Appearance:    Alert, cooperative, no distress, appears stated age   Head:    Normocephalic, without obvious abnormality, atraumatic   Eyes:    PERRL, conjunctivae/corneas clear, EOM's intact, both eyes   Ears:    Normal external ear canals, both ears   Nose:   Nares normal, septum midline, mucosa normal, no drainage    or sinus tenderness   Throat:   Lips, tongue, gums normal; oral mucosa pink and moist   Neck:   Supple, symmetrical, trachea midline, no adenopathy;     thyroid:  no enlargement/tenderness/nodules; no carotid    bruit or JVD   Back:     Symmetric, no curvature, ROM normal, no CVA tenderness   Lungs:     Decreased breath sounds bilaterally, respirations unlabored   Chest Wall:    No tenderness or deformity    Heart:    Regular rate and rhythm, S1 and S2 normal, no murmur, rub   or gallop   Abdomen:    "  Soft, nontender, bowel sounds active all four quadrants,     no masses, no hepatomegaly, no splenomegaly   Extremities:   Extremities normal, atraumatic, no cyanosis or edema   Pulses:   Pulses palpable in all extremities; symmetric all extremities   Skin:   Skin color normal, Skin is warm and dry,  no rashes or palpable lesions   Neurologic:   CNII-XII intact, motor strength grossly intact, sensation grossly intact to light touch, no focal deficits noted       Data Review:  Labs in chart were reviewed.  No results found for: WBC, HGB, HCT, PLT  No results found for: NA, K, CL, CO2, BUN, CREATININE, GLUCOSE  No results found for: CALCIUM, MG, PHOS  No results found for: AST, ALT, ALKPHOS            Imaging Results (Last 7 Days)     Procedure Component Value Units Date/Time    XR Spine Lumbar 2 or 3 View [[id number]] Collected: 01/06/22 1419     Updated: 01/06/22 1437    Narrative:      LUMBAR SPINE OPERATIVE X-RAYS     HISTORY: Back pain.     TECHNIQUE: 56 seconds of fluoroscopy was provided operatively for Dr. Brewer. Five fluoroscopic images were saved.     FINDINGS: Images provided show level localization and subsequent  application of pedicle screws at L4, L5, S1, and intervertebral cages at  the L4-5 and L5-S1 levels.       Impression:      Intraoperative imaging was provided for Dr. Brewer  during lumbar spine surgery.     This report was finalized on 1/6/2022 2:34 PM by Dr. Christiano Long M.D.       FL C Arm During Surgery [[id number]] Resulted: 01/06/22 1355     Updated: 01/06/22 1355    Narrative:      This procedure was auto-finalized with no dictation required.        Past Medical History:   Diagnosis Date   • Acid reflux    • Arthritis     OSTEO   • Bradycardia     40's-50's    • Congenital spondylolysis, lumbosacral region    • Hypertension    • Low back pain    • Peripheral neuropathy    • Pre-diabetes    • Risk factors for obstructive sleep apnea     YOAN 7   • Sleep apnea     instructed to  bring cpap for surgery    • Spinal stenosis of lumbar region with radiculopathy        Assessment:  Active Hospital Problems    Diagnosis  POA   • Spinal stenosis of lumbar region with neurogenic claudication [M48.062]  Yes   • Congenital spondylolysis, lumbosacral region [Q76.2]  Unknown   • Spinal stenosis of lumbar region with radiculopathy [M48.061, M54.16]  Unknown      Resolved Hospital Problems   No resolved problems to display.   hypertension  Prediabetes  Obesity   Sleep apnea    Plan:  Blood pressure is mostly controlled  Will monitor  Check labs in am and monitor glucose  He will use his cpap for sleep apnea  Thanks for involving us in his care  Will follow with you    Diana Downing MD   1/6/2022  19:17 EST

## 2022-01-07 NOTE — PLAN OF CARE
Goal Outcome Evaluation:              Outcome Summary: 61/M S/P of l4-L5 open decompression. VSS. PCA pump and PO meds for pain. SALIMA drain in place. DTV. Up with assist x1 and Back brace on when OOB. Plan is to d/c home when medically stable. Educated on BP monitoring. Will continue to monitor.

## 2022-01-07 NOTE — PLAN OF CARE
Goal Outcome Evaluation:  Plan of Care Reviewed With: patient           Outcome Summary: Pt. is a 61 year old Male who is s/p L4-L5, L5-S1 Decompression with fusion.  Pt. reports that prior to admission he was independent with functional mobility and no use of an A.D. during ambulation.  Pt. currently presents with decreased strength, decreased balance, and decreased tolerance to functional activity.  This AM, pt. able to ambulate 350 feet, CGA x 1, with use of Rwx.  Pt. requires CGA x 1 for bed mobility (via logroll) and CGA x 1 for sit <-> stand transfers.  BLE ther. ex. program x 10 reps completed for general strengthening. Pt. will benefit from continued skilled inpt. P.T. to address his functional deficits and to assist pt. in regaining his maximum level of independence with functional mobility.    Patient was wearing a face mask during this therapy encounter. Therapist used appropriate personal protective equipment including eye protection, mask, and gloves.  Mask used was standard procedure mask. Appropriate PPE was worn during the entire therapy session. Hand hygiene was completed before and after therapy session. Patient is not in enhanced droplet precautions.

## 2022-01-07 NOTE — THERAPY EVALUATION
Patient Name: Christiano Barr  : 1960    MRN: [id number]                              Today's Date: 2022       Admit Date: 2022    Visit Dx:     ICD-10-CM ICD-9-CM   1. Spinal stenosis of lumbar region with neurogenic claudication  M48.062 724.03   2. Congenital spondylolysis, lumbosacral region  Q76.2 756.11     Patient Active Problem List   Diagnosis   • Primary osteoarthritis of right knee   • Acute midline low back pain with right-sided sciatica   • GERD without esophagitis   • HTN (hypertension)   • Obesity (BMI 30-39.9)   • Spinal stenosis of lumbar region with radiculopathy   • Numbness and tingling of right leg   • Epidural lipomatosis   • Prediabetes   • Congenital spondylolysis, lumbosacral region   • Spinal stenosis of lumbar region with neurogenic claudication     Past Medical History:   Diagnosis Date   • Acid reflux    • Arthritis     OSTEO   • Bradycardia     40's-50's    • Congenital spondylolysis, lumbosacral region    • Hypertension    • Low back pain    • Peripheral neuropathy    • Pre-diabetes    • Risk factors for obstructive sleep apnea     YOAN 7   • Sleep apnea     instructed to bring cpap for surgery    • Spinal stenosis of lumbar region with radiculopathy      Past Surgical History:   Procedure Laterality Date   • APPENDECTOMY     • INGUINAL HERNIA REPAIR Right    • TOTAL KNEE ARTHROPLASTY Left 2018    Procedure: LT TOTAL KNEE ARTHROPLASTY;  Surgeon: Ralph Panchal MD;  Location: [facility];  Service:       General Information     Row Name 22 1145          Physical Therapy Time and Intention    Document Type evaluation  Pt. is s/p L4-L5, L5-S1 Decompression with Fusion  -MS     Mode of Treatment physical therapy; individual therapy  -MS     Row Name 22 1145          General Information    Patient Profile Reviewed yes  -MS     Prior Level of Function independent:  -MS     Existing Precautions/Restrictions spinal   Back brace donned when OOB  -MS      Barriers to Rehab none identified  -MS     Row Name 01/07/22 1145          Cognition    Orientation Status (Cognition) oriented x 3  -MS     Row Name 01/07/22 1145          Safety Issues, Functional Mobility    Comment, Safety Issues/Impairments (Mobility) Gait belt used for safety.  -MS           User Key  (r) = Recorded By, (t) = Taken By, (c) = Cosigned By    Initials Name Provider Type    Rg Shetty, PT Physical Therapist               Mobility     Row Name 01/07/22 1146          Bed Mobility    Bed Mobility supine-sit; sit-supine  -MS     Supine-Sit [place] (Bed Mobility) contact guard  -MS     Comment (Bed Mobility) Via logroll; Once sitting EOB, assisted pt. in donning his back brace prior to ambulation  -MS     Row Name 01/07/22 1146          Sit-Stand Transfer    Sit-Stand [place] (Transfers) contact guard  -MS     Assistive Device (Sit-Stand Transfers) walker, front-wheeled  -MS     Row Name 01/07/22 1146          Gait/Stairs (Locomotion)    [place] Level (Gait) contact guard  -MS     Assistive Device (Gait) walker, front-wheeled  -MS     Distance in Feet (Gait) 350 feet  -MS     Deviations/Abnormal Patterns (Gait) artie decreased; antalgic  -MS           User Key  (r) = Recorded By, (t) = Taken By, (c) = Cosigned By    Initials Name Provider Type    Rg Shetty PT Physical Therapist               Obj/Interventions     Row Name 01/07/22 1147          Range of Motion Comprehensive    Comment, General Range of Motion BUE/LE (WFL's)  -MS     Row Name 01/07/22 1147          Strength Comprehensive (MMT)    Comment, General Manual Muscle Testing (MMT) Assessment BUE/LE (>/= 3/5)  -MS     Row Name 01/07/22 1147          Motor Skills    Therapeutic Exercise --  BLE ther. ex. program x 10 reps completed (Ankle pumps, Hip Flexion, LAQ's)  -MS           User Key  (r) = Recorded By, (t) = Taken By, (c) = Cosigned By    Initials Name Provider Type    Rg Shetty, PT  Physical Therapist               Goals/Plan     Row Name 01/07/22 1148          Bed Mobility Goal 1 (PT)    Activity/Assistive Device (Bed Mobility Goal 1, PT) bed mobility activities, all  -MS     [place] Level/Cues Needed (Bed Mobility Goal 1, PT) standby assist  -MS     Time Frame (Bed Mobility Goal 1, PT) long term goal (LTG); 5 days  -MS     Row Name 01/07/22 1148          Transfer Goal 1 (PT)    Activity/Assistive Device (Transfer Goal 1, PT) transfers, all; walker, rolling  -MS     [place] Level/Cues Needed (Transfer Goal 1, PT) standby assist  -MS     Time Frame (Transfer Goal 1, PT) long term goal (LTG); 5 days  -MS     Row Name 01/07/22 1148          Gait Training Goal 1 (PT)    Activity/Assistive Device (Gait Training Goal 1, PT) gait (walking locomotion); walker, rolling  -MS     [place] Level (Gait Training Goal 1, PT) standby assist  -MS     Distance (Gait Training Goal 1, PT) 400 feet  -MS     Time Frame (Gait Training Goal 1, PT) long term goal (LTG); 5 days  -MS           User Key  (r) = Recorded By, (t) = Taken By, (c) = Cosigned By    Initials Name Provider Type    MS Rg Aleman, PT Physical Therapist               Clinical Impression     Row Name 01/07/22 1148          Pain    Additional Documentation Pain Scale: Numbers Pre/Post-Treatment (Group)  -MS     [facility] Name 01/07/22 1148          Pain Scale: Numbers Pre/Post-Treatment    Pretreatment Pain Rating 4/10  -MS     Posttreatment Pain Rating 4/10  -MS     Pain Location - Orientation lower  -MS     Pain Location back  -MS     Pain Intervention(s) Medication (See MAR); Nursing Notified; Repositioned  -MS     Row Name 01/07/22 1148          Plan of Care Review    Plan of Care Reviewed With patient; family  -MS     Row Name 01/07/22 1148          Therapy Assessment/Plan (PT)    Rehab Potential (PT) good, to achieve stated therapy goals  -MS     Criteria for Skilled Interventions Met (PT) skilled treatment is necessary  -MS      Row Name 01/07/22 1148          Positioning and Restraints    Pre-Treatment Position in bed  -MS     Post Treatment Position chair  -MS     In Chair notified nsg; sitting; call light within reach; encouraged to call for assist; exit alarm on; with family/caregiver  All lines intact.  -MS           User Key  (r) = Recorded By, (t) = Taken By, (c) = Cosigned By    Initials Name Provider Type    Rg Shetty, PT Physical Therapist               Outcome Measures     Row Name 01/07/22 1149          How much help from another person do you currently need...    Turning from your back to your side while in flat bed without using bedrails? 3  -MS     Moving from lying on back to sitting on the side of a flat bed without bedrails? 3  -MS     Moving to and from a bed to a chair (including a wheelchair)? 3  -MS     Standing up from a chair using your arms (e.g., wheelchair, bedside chair)? 3  -MS     Climbing 3-5 steps with a railing? 3  -MS     To walk in hospital room? 3  -MS     AM-PAC 6 Clicks Score (PT) 18  -MS     Row Name 01/07/22 1149          Functional Assessment    Outcome Measure Options AM-PAC 6 Clicks Basic Mobility (PT)  -MS           User Key  (r) = Recorded By, (t) = Taken By, (c) = Cosigned By    Initials Name Provider Type    Rg Shetty, PT Physical Therapist                             Physical Therapy Education                 Title: PT OT SLP Therapies (Done)     Topic: Physical Therapy (Done)     Point: Mobility training (Done)     Learning Progress Summary           Patient Acceptance, E,D, VU,NR by MS at 1/7/2022 1149                   Point: Home exercise program (Done)     Learning Progress Summary           Patient Acceptance, E,D, VU,NR by MS at 1/7/2022 1149                   Point: Body mechanics (Done)     Learning Progress Summary           Patient Acceptance, E,D, VU,NR by MS at 1/7/2022 1149                   Point: Precautions (Done)     Learning Progress Summary            Patient Acceptance, E,D, VU,NR by MS at 1/7/2022 1149                               User Key     Initials Effective Dates Name Provider Type Discipline    MS 06/16/21 -  Rg Aleman PT Physical Therapist PT              PT Recommendation and Plan  Planned Therapy Interventions (PT): balance training, bed mobility training, gait training, home exercise program, patient/family education, transfer training, postural re-education, strengthening, stair training  Plan of Care Reviewed With: patient  Outcome Summary: Pt. is a 61 year old Male who is s/p L4-L5, L5-S1 Decompression with fusion.  Pt. reports that prior to admission he was independent with functional mobility and no use of an A.D. during ambulation.  Pt. currently presents with decreased strength, decreased balance, and decreased tolerance to functional activity.  This AM, pt. able to ambulate 350 feet, CGA x 1, with use of Rwx.  Pt. requires CGA x 1 for bed mobility (via logroll) and CGA x 1 for sit <-> stand transfers.  BLE ther. ex. program x 10 reps completed for general strengthening. Pt. will benefit from continued skilled inpt. P.T. to address his functional deficits and to assist pt. in regaining his maximum level of independence with functional mobility.     Time Calculation:    PT Charges     Row Name 01/07/22 1153             Time Calculation    Start Time 0948  -MS      Stop Time 1005  -MS      Time Calculation (min) 17 min  -MS      PT Received On 01/07/22  -MS      PT - Next Appointment 01/08/22  -MS      PT Goal Re-Cert Due Date 01/12/22  -MS              Time Calculation- PT    Total Timed Code Minutes- PT 16 minute(s)  -MS            User Key  (r) = Recorded By, (t) = Taken By, (c) = Cosigned By    Initials Name Provider Type    MS Rg Aleman, PT Physical Therapist              Therapy Charges for Today     Code Description Service Date Service Provider Modifiers Qty    27861627086 HC PT EVAL LOW COMPLEXITY 2 1/7/2022 Ash  Rg ABURTO, PT GP 1    88412292287 HC PT THER PROC EA 15 MIN 1/7/2022 Rg Aleman, PT GP 1          PT G-Codes  Outcome Measure Options: AM-PAC 6 Clicks Basic Mobility (PT)  AM-PAC 6 Clicks Score (PT): 18    Rg Aleman, PT  1/7/2022

## 2022-01-07 NOTE — PROGRESS NOTES
[facility] ([facility]UROSURGERY PROGRESS NOTE      CC: Back pain      Subjective     Interval History: POD #1 Open lumbar decompression L4/5, Crump procedure L5/S1, posterior lumbar interbody fusion with bilateral [employer] cages at L4/5, L5/S1 with pedicle screw/susan/crosslink fixation L4, L5, S1 with Mazor robotic navigation.  Ambulating in paz with walker, states that he is feeling better, has some left hip, foot pain since surgery and getting better, denies any numbness or tingling in his BLE, right ankle.        ROS:  Constitutional: No fever, chills  GI: No nausea, vomiting  MS: back pain  Neuro: No numbness, tingling, or weakness,  balance difficulties  : No difficulty voiding -f/c    Objective     Vital signs in last 24 hours:  Temp:  [97 °F (36.1 °C)-99.9 °F (37.7 °C)] 97.9 °F (36.6 °C)  Heart Rate:  [58-95] 68  Resp:  [14-16] 16  BP: (103-167)/(50-87) 125/75    Intake/Output this shift:  I/O this shift:  In: 373 [P.O.:240; I.V.:133]  Out: 50 [Drains:50]    LABS:  Results from last 7 days   Lab Units 01/07/22  0631   WBC 10*3/mm3 13.57*   HEMOGLOBIN g/dL 12.4*   HEMATOCRIT % 34.8*   PLATELETS 10*3/mm3 137*     Results from last 7 days   Lab Units 01/07/22  0631   SODIUM mmol/L 134*   POTASSIUM mmol/L 3.8   CHLORIDE mmol/L 102   CO2 mmol/L 22.3   BUN mg/dL 23   CREATININE mg/dL 0.95   CALCIUM mg/dL 7.9*   GLUCOSE mg/dL 148*       IMAGING STUDIES:  No new imaging    Meds reviewed/changed: Yes    Current Facility-Administered Medications:   •  acetaminophen (TYLENOL) tablet 650 mg, 650 mg, Oral, Q4H PRN, Luis Brewer MD  •  cyclobenzaprine (FLEXERIL) tablet 10 mg, 10 mg, Oral, TID PRN, Luis Brewer MD, 10 mg at 01/07/22 1302  •  diazePAM (VALIUM) tablet 5 mg, 5 mg, Oral, Q6H PRN, Luis Brewer MD  •  docusate sodium (COLACE) capsule 100 mg, 100 mg, Oral, BID PRN, Luis Brewer MD  •  HYDROcodone-acetaminophen (NORCO) 7.5-325 MG per tablet 1 tablet, 1 tablet, Oral, Q4H PRN, Luis Brewer MD  •   HYDROcodone-acetaminophen (NORCO) 7.5-325 MG per tablet 2 tablet, 2 tablet, Oral, Q4H PRN, Luis Brewer MD, 2 tablet at 01/07/22 1301  •  HYDROmorphone (DILAUDID) PCA 0.2 mg/ml 50 mL syringe, , Intravenous, Continuous, Luis Brewer MD, Currently Infusing at 01/07/22 1050  •  lactated ringers infusion, 75 mL/hr, Intravenous, Continuous, Yosvany Busby MD, Last Rate: 75 mL/hr at 01/07/22 1050, 75 mL/hr at 01/07/22 1050  •  [START ON 1/8/2022] losartan (COZAAR) 50 mg, hydroCHLOROthiazide (HYDRODIURIL) 12.5 mg for HYZAAR 50-12.5, , Oral, Daily, Yosvany Busby MD  •  metoprolol succinate XL (TOPROL-XL) 24 hr tablet 25 mg, 25 mg, Oral, Q24H, Luis Brewer MD, 25 mg at 01/07/22 0815  •  montelukast (SINGULAIR) tablet 10 mg, 10 mg, Oral, Daily, Luis Brewer MD, 10 mg at 01/07/22 0815  •  naloxone (NARCAN) injection 0.1 mg, 0.1 mg, Intravenous, Q5 Min PRN, Luis Brewer MD  •  ondansetron (ZOFRAN) tablet 4 mg, 4 mg, Oral, Q6H PRN **OR** ondansetron (ZOFRAN) injection 4 mg, 4 mg, Intravenous, Q6H PRN, Luis Brewer MD  •  pantoprazole (PROTONIX) EC tablet 40 mg, 40 mg, Oral, Daily, Luis Brewer MD, 40 mg at 01/07/22 0815  •  pravastatin (PRAVACHOL) tablet 20 mg, 20 mg, Oral, Daily, Luis Brewer MD, 20 mg at 01/07/22 0815  •  sennosides-docusate (PERICOLACE) 8.6-50 MG per tablet 1 tablet, 1 tablet, Oral, Nightly PRN, Luis Brewer MD  •  sodium chloride 0.9 % flush 10 mL, 10 mL, Intravenous, Q12H, Luis Brewer MD  •  sodium chloride 0.9 % flush 10 mL, 10 mL, Intravenous, PRN, Luis Brewer MD      Physical Exam:    General:   Awake, alert, oriented x3. Speech clear with no aphasia  Back:    Incision dressing is c/d/i, SALIMA drain has approx 20cc's of bloody drainage  Motor: Normal muscle strength, bulk and tone in upper and lower extremities.  No fasciculations, rigidity, spasticity, or abnormal movements.  Sensation: Normal to light  "touch  Coordination: Moving all extremities  Station and Gait:             Normal gait and station.    Extremities:   Wearing SCD      Assessment/Plan     ASSESSMENT:      Spinal stenosis of lumbar region with radiculopathy    Congenital spondylolysis, lumbosacral region    Spinal stenosis of lumbar region with neurogenic claudication      PLAN:   Miralax 07 gm daily  Colace 100 mg BID  Bisacodyl 10mg supp, prn constipation  Encourage to use oral pain medication  Encourage to ambulate  Encourage to use IS  D/C F/C    I discussed the patient's findings and my recommendations with patient, nursing staff and Dr. Brewer       LOS: 1 day       Alaina Mascorro, APRN  1/7/2022  13:24 EST    \"Dictated utilizing Dragon dictation\".      "

## 2022-01-07 NOTE — PAYOR COMM NOTE
"Juliet Mattson (61 y.o. Male)     PLEASE SEE ATTACHED FOR INPT NOTIFICATION.    PLEASE CALL   OR  [phone]    THANK YOU    BEBO MILLIGAN LPN CCP            Date of Birth Social Security Number Address Home Phone MRN    1960  [id number]   [facility] [address] [phone] [id number]    Buddhism Marital Status             Anabaptism        Admission Date Admission Type Admitting Provider Attending Provider Department, Room/Bed    1/6/22 Elective Luis Brewer MD Villanueva, Wayne, MD [facility] [address], P796/1    Discharge Date Discharge Disposition Discharge Destination                         Attending Provider: Luis Brewer MD    Allergies: No Known Allergies    Isolation: None   Infection: None   Code Status: Prior   Advance Care Planning Activity    Ht: 170.2 cm (67\")   Wt: 96.3 kg (212 lb 4.9 oz)    Admission Cmt: None   Principal Problem: None                Active Insurance as of 1/6/2022     Primary Coverage     Payor Plan Insurance Group Employer/Plan Group    [facility] BY DYLAN [id number]     Payor Plan Address Payor Plan Phone Number Payor Plan Fax Number Effective Dates    [address]   1/1/2021 - None Entered    [facility] [id number]       Subscriber Name Subscriber Birth Date Member ID       JULIET MATTSON 1960 [id number]                 Emergency Contacts      (Rel.) Home Phone Work Phone Mobile Phone    Nasima Mattson (Spouse) -- -- [phone]               History & Physical      Luis Brewer MD at 01/06/22 0741                  Encounter Information    Encounter Information    Department Encounter #   12/22/2021  9:40 PM  [facility] [id number]      Luis Brewer MD   Physician   Neurosurgery   Progress Notes       Signed   Date of Service:  12/25/21 1606   Creation Time:  12/25/21 1606              Signed        Expand AllCollapse All  "         Show:Clear all  [x]Manual[x]Template[x]Copied    Added by:  [x]Luis Brewer MD      []Vj for details       [facility]     Progress Note     Patient Name: Christiano Barr  : 1960  MRN: [id number]  Primary Care Physician:  Daniel Johnson MD  Date of admission: 2021        Subjective      Subjective      Chief Complaint: Severe right buttock and leg pain with weakness from an L5-S1 isthmic spondylolisthesis     This patient has had about 9 to 10 days of severe back and right leg pain with weakness.  He has had 20 years of back pain and manageable right leg pain.  He was found to have some spinal stenosis at L4-L5 but probably the main issue was L5-S1 isthmic spondylolisthesis with bilateral pars defects.  He does have some mild weakness in his right quadricep and right tibialis anterior.  No bowel or bladder incontinence.  He is never really had any conservative treatment.  I had a long discussion with him about this.  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  On the other hand little doubtful that epidural blocks and time in steroids and gabapentin will be enough given the severity of his symptoms and the severity of the nerve root compression.  But only gets unreasonable to try the block and continue the steroids and gabapentin for now.  We will ask the pain clinic to do the block on Monday.  I have time of my schedule to do a fusion on Friday if he still feels that he has severe enough pain and weakness to justify it.  But will give the conservative treatment a try.  We will also increase his gabapentin to 600 mg twice daily.  His left leg is fine.  He actually does not have much back pain.     Patient Reports see above     Review of Systems    HENT: Negative.    Eyes: Negative.    Respiratory: Negative.    Cardiovascular: Negative.    Gastrointestinal: Negative.    Endocrine: Negative.    Genitourinary: Negative.    Musculoskeletal: Positive for back pain.   Skin: Negative.    Allergic/Immunologic: Negative.    Neurological: Positive for weakness.   Hematological: Negative.    Psychiatric/Behavioral: Negative.    All other systems reviewed and are negative.              Objective      Objective      Vitals:   Temp:  [97.1 °F (36.2 °C)-98.4 °F (36.9 °C)] 97.6 °F (36.4 °C)  Heart Rate:  [42-52] 52  Resp:  [16] 16  BP: (108-132)/(52-77) 132/77     Physical Exam  HENT:      Head: Normocephalic and atraumatic.      Right Ear: External ear normal.      Left Ear: External ear normal.      Nose: Nose normal.      Mouth/Throat:      Mouth: Mucous membranes are moist.      Pharynx: Oropharynx is clear.   Eyes:      Extraocular Movements: Extraocular movements intact.      Pupils: Pupils are equal, round, and reactive to light.   Cardiovascular:      Rate and Rhythm: Normal rate.      Pulses: Normal pulses.   Pulmonary:      Effort: Pulmonary effort is normal.   Abdominal:      General: Abdomen is flat.   Musculoskeletal:         General: No swelling.      Cervical back: Normal range of motion.   Skin:     General: Skin is warm.   Neurological:      Mental Status: He is alert and oriented to person, place, and time.      Cranial Nerves: No cranial nerve deficit.      Sensory: No sensory deficit.      Motor: Weakness present.      Coordination: Coordination normal.      Gait: Gait normal.      Deep Tendon Reflexes: Reflexes normal.      Comments: 4-5 strength in his right quadricep and right tibialis anterior                           Result Review       Result Review:  I have personally reviewed the results from the time of this admission to 12/25/2021 16:06 EST and agree with these findings:  []?  Laboratory  []?  Microbiology  [x]?  Radiology  []?  EKG/Telemetry    []?  Cardiology/Vascular   []?  Pathology  []?  Old records  []?  Other:MRI LUMBAR SPINE WITHOUT CONTRAST     HISTORY: Back pain, right radiculopathy.     COMPARISON: None.     FINDINGS: There is moderate loss of disc height and mild-to-moderate  disc desiccation at L4-L5. The conus is at L1 and the caudal aspect of  the spinal cord appears unremarkable.     L1-L2: There is no evidence of disc bulge or herniation.     L2-L3: Mild facet degenerative disease is present bilaterally.     L3-L4: Mild facet degenerative disease is present bilaterally. A small  central disc osteophyte complex is present with no evidence of  herniation.     L4-L5: Moderate facet degenerative disease is present bilaterally. There  is a central disc osteophyte complex including a right paracentral disc  protrusion. When combined with prominent epidural fat anterior and  posterior to the thecal sac there is severe canal stenosis and narrowing  of the thecal sac. Mild foraminal stenosis is present bilaterally  secondary to extension of a small disc osteophyte complex into the  neural foramen.     L5-S1: Bilateral L5 pars defects are appreciated. There is a central  disc osteophyte complex resulting in mild canal stenosis. Again,  prominent epidural fat is identified concentrically narrowing the thecal  sac. Moderate foraminal stenosis is present on the right secondary to  extension of disc material into the neural foramen. This approaches the  right L5 nerve within the neural foramen.     Transitional anatomy is appreciated with partial sacralization of L5.     IMPRESSION:  1.  Note is made of transitional anatomy with partial sacralization of  L5. Careful correlation prior to any intervention is required given the  presence of transitional anatomy.  2.  Bilateral L5 pars defects.  3.  Moderate foraminal stenosis on the right at L5-S1 secondary to  extension of disc material into the neural foramen.  4.  Epidural lipomatosis extending from  mid body of L4 to the inferior  aspect of the spinal canal significantly narrowing the thecal sac at  L4-L5 and L5-S1. See above.     This report was finalized on 12/23/2021 2:38 PM by Dr. Yosvany Aguiar M.D.           Lab Results   Component Value Date     GLUCOSE 152 (H) 12/25/2021     BUN 29 (H) 12/25/2021     CREATININE 1.08 12/25/2021     EGFRIFNONA 70 12/25/2021     BCR 26.9 (H) 12/25/2021     K 4.2 12/25/2021     CO2 22.8 12/25/2021     CALCIUM 9.0 12/25/2021     ALBUMIN 4.50 12/22/2021     AST 20 12/22/2021     ALT 38 12/22/2021            WBC   Date Value Ref Range Status   12/25/2021 16.25 (H) 3.40 - 10.80 10*3/mm3 Final            RBC   Date Value Ref Range Status   12/25/2021 5.04 4.14 - 5.80 10*6/mm3 Final            Hemoglobin   Date Value Ref Range Status   12/25/2021 16.4 13.0 - 17.7 g/dL Final            Hematocrit   Date Value Ref Range Status   12/25/2021 48.0 37.5 - 51.0 % Final            MCV   Date Value Ref Range Status   12/25/2021 95.2 79.0 - 97.0 fL Final            MCH   Date Value Ref Range Status   12/25/2021 32.5 26.6 - 33.0 pg Final            MCHC   Date Value Ref Range Status   12/25/2021 34.2 31.5 - 35.7 g/dL Final            RDW   Date Value Ref Range Status   12/25/2021 12.5 12.3 - 15.4 % Final            RDW-SD   Date Value Ref Range Status   12/25/2021 43.6 37.0 - 54.0 fl Final            MPV   Date Value Ref Range Status   12/25/2021 9.3 6.0 - 12.0 fL Final            Platelets   Date Value Ref Range Status   12/25/2021 240 140 - 450 10*3/mm3 Final            Neutrophil %   Date Value Ref Range Status   12/22/2021 71.7 42.7 - 76.0 % Final            Lymphocyte %   Date Value Ref Range Status   12/22/2021 18.1 (L) 19.6 - 45.3 % Final            Monocyte %   Date Value Ref Range Status   12/22/2021 8.0 5.0 - 12.0 % Final            Eosinophil %   Date Value Ref Range Status   12/22/2021 0.2 (L) 0.3 - 6.2 % Final            Basophil %   Date Value Ref Range Status   12/22/2021  0.3 0.0 - 1.5 % Final            Immature Grans %   Date Value Ref Range Status   12/22/2021 1.7 (H) 0.0 - 0.5 % Final            Neutrophils, Absolute   Date Value Ref Range Status   12/22/2021 8.31 (H) 1.70 - 7.00 10*3/mm3 Final            Lymphocytes, Absolute   Date Value Ref Range Status   12/22/2021 2.10 0.70 - 3.10 10*3/mm3 Final            Monocytes, Absolute   Date Value Ref Range Status   12/22/2021 0.93 (H) 0.10 - 0.90 10*3/mm3 Final            Eosinophils, Absolute   Date Value Ref Range Status   12/22/2021 0.02 0.00 - 0.40 10*3/mm3 Final            Basophils, Absolute   Date Value Ref Range Status   12/22/2021 0.03 0.00 - 0.20 10*3/mm3 Final            Immature Grans, Absolute   Date Value Ref Range Status   12/22/2021 0.20 (H) 0.00 - 0.05 10*3/mm3 Final            nRBC   Date Value Ref Range Status   12/22/2021 0.0 0.0 - 0.2 /100 WBC Final         Most notable findings include:         Assessment/Plan      Assessment / Plan      Brief Patient Summary:  Christiano Barr is a 61 y.o. male who has about a 10-day history of severe right buttock and leg pain with weakness.  He has an L5-S1 isthmic spondylolisthesis with root compression.     Active Hospital Problems:       Active Hospital Problems     Diagnosis     • **Acute midline low back pain with right-sided sciatica     • GERD without esophagitis     • HTN (hypertension)     • Obesity (BMI 30-39.9)     • Spinal stenosis of lumbar region with radiculopathy     • Numbness and tingling of right leg     • Epidural lipomatosis        Plan: See above.  We will try an epidural block on Monday but if that does not work then we will have to do a fusion from L4-S1.  I am prepared to maximize conservative treatment so we will also increase his gabapentin.  If he is better after the Monday block then he can go home with follow-up with me in the office.  If not then I will be able to do surgery on him until Friday of next week.        DVT  prophylaxis:  Mechanical DVT prophylaxis orders are present.     CODE STATUS:    Level Of Support Discussed With: Patient  Code Status (Patient has no pulse and is not breathing): CPR (Attempt to Resuscitate)  Medical Interventions (Patient has pulse or is breathing): Full     Disposition:  I expect patient to be discharged as per the primary team.     Luis Brewer MD                                         ED to Hosp-Admission (Discharged) on 12/22/2021           ED to Hosp-Admission (Discharged) on 12/22/2021                Detailed Report                ROS Info            Note shared with patient          I described and recommended a lumbar decompression and fusion with posterior lumbar interbody fusion (PLIF) and open pedicle screw fixation at L4/5 and L5/S1.  The goal of surgery is relief of radiating leg pain, improvement of numbness, tingling, and weakness, and reduction in overall low back pain. The risks include, but are not limited to, infection, hemorrhage requiring transfusion or reoperation, CSF leak requiring reoperation, incomplete relief of symptoms, psuedoarthrosis resulting in chronic low back pain, hardward problems requiring revision or removal, potential need for additional surgery in the future, stroke, paralysis, coma, and death. The patient agrees to proceed.     Will proceed as above. Has progressive right leg pain and weakness.          Electronically signed by Luis Brewer MD at 01/06/22 0747       Emergency Department Notes    No notes of this type exist for this encounter.

## 2022-01-07 NOTE — PLAN OF CARE
Goal Outcome Evaluation:  Plan of Care Reviewed With: patient        Progress: improving  Outcome Summary: Pt remains stable. Declined any offers to get out of bed this shift. Will be evaluated by PT today. Dressing to spinal incision is cdi with SALIMA drain in place. PCA with minimal use. Giving PRN pain meds Q4hrs with good pain control. Voiding per f/c. N&T to LLE is improving. Educated on htn management. LHA following. D/C plan pending. FARHAT.

## 2022-01-07 NOTE — PROGRESS NOTES
[facility] Medicine Services  PROGRESS NOTE    Patient Name: Christiano Barr  : 1960  MRN: [id number]    Date of Admission: 2022  Primary Care Physician: Daniel Johnson MD    Subjective   Subjective     CC:  Med manage     HPI:  Pain controlled, no new complaints.     Review of Systems  No current fevers or chills  No current shortness of breath or cough  No current nausea, vomiting, or diarrhea  No current chest pain or palpitations      Objective   Objective     Vital Signs:   Temp:  [97 °F (36.1 °C)-99.9 °F (37.7 °C)] 98.9 °F (37.2 °C)  Heart Rate:  [58-95] 74  Resp:  [14-16] 16  BP: (103-167)/(50-87) 107/50        Physical Exam:  Constitutional:Awake, alert  HENT: NCAT, mucous membranes moist, neck supple  Respiratory: Clear to auscultation bilaterally, respiratory effort normal, nonlabored breathing   Cardiovascular: RRR, normal radial pulses  Gastrointestinal: Positive bowel sounds, soft, nontender, nondistended  Musculoskeletal: Normal musculature for age, no lower extremity edema, BMI 33 obese  Psychiatric: Appropriate affect, cooperative, conversational  Neurologic: No slurred speech or facial droop, follows commands  Skin: No rashes or jaundice, warm      Results Reviewed:  Results from last 7 days   Lab Units 22  0631   WBC 10*3/mm3 13.57*   HEMOGLOBIN g/dL 12.4*   HEMATOCRIT % 34.8*   PLATELETS 10*3/mm3 137*     Results from last 7 days   Lab Units 22  0631   SODIUM mmol/L 134*   POTASSIUM mmol/L 3.8   CHLORIDE mmol/L 102   CO2 mmol/L 22.3   BUN mg/dL 23   CREATININE mg/dL 0.95   GLUCOSE mg/dL 148*   CALCIUM mg/dL 7.9*     Estimated Creatinine Clearance: 90.3 mL/min (by C-G formula based on SCr of 0.95 mg/dL).    Microbiology Results Abnormal     None          Imaging Results (Last 24 Hours)     Procedure Component Value Units Date/Time    XR Spine Lumbar 2 or 3 View [[id number]] Collected: 22 1419     Updated: 22 1437    Narrative:      LUMBAR SPINE  OPERATIVE X-RAYS     HISTORY: Back pain.     TECHNIQUE: 56 seconds of fluoroscopy was provided operatively for Dr. Brewer. Five fluoroscopic images were saved.     FINDINGS: Images provided show level localization and subsequent  application of pedicle screws at L4, L5, S1, and intervertebral cages at  the L4-5 and L5-S1 levels.       Impression:      Intraoperative imaging was provided for Dr. Brewer  during lumbar spine surgery.     This report was finalized on 1/6/2022 2:34 PM by Dr. Christiano Long M.D.       FL C Arm During Surgery [[id number]] Resulted: 01/06/22 1355     Updated: 01/06/22 1355    Narrative:      This procedure was auto-finalized with no dictation required.              I have reviewed the medications:  Scheduled Meds:ceFAZolin, 2 g, Intravenous, Q8H  losartan-HCTZ (HYZAAR) 100-25 combo dose, , Oral, Daily  metoprolol succinate XL, 25 mg, Oral, Q24H  montelukast, 10 mg, Oral, Daily  pantoprazole, 40 mg, Oral, Daily  pravastatin, 20 mg, Oral, Daily  sodium chloride, 10 mL, Intravenous, Q12H      Continuous Infusions:HYDROmorphone HCl-NaCl,   lactated ringers, 100 mL/hr, Last Rate: 100 mL/hr (01/07/22 0751)      PRN Meds:.•  acetaminophen  •  cyclobenzaprine  •  diazePAM  •  docusate sodium  •  HYDROcodone-acetaminophen  •  HYDROcodone-acetaminophen  •  naloxone  •  ondansetron **OR** ondansetron  •  senna-docusate sodium  •  sodium chloride    Assessment/Plan   Assessment & Plan     Active Hospital Problems    Diagnosis  POA   • Spinal stenosis of lumbar region with neurogenic claudication [M48.062]  Yes   • Congenital spondylolysis, lumbosacral region [Q76.2]  Unknown   • Spinal stenosis of lumbar region with radiculopathy [M48.061, M54.16]  Unknown      Resolved Hospital Problems   No resolved problems to display.        Brief Hospital Course to date:  Christiano Barr is a 61 y.o. male     Hypertension  Prediabetes  Obesity   Sleep apnea    Plan:  Decr BP ARB/HCTZ by half as BP  borderline low.    Adjust IVF and stop tomorrow if eating well  Monitor Glc.  Supportive care and symptom treatment.  CPAP QHS  WBCs likely reactive AF AND VSS    DVT Prophylaxis:  Defer to primary    Yosvany Busby MD  01/07/22

## 2022-01-08 PROBLEM — R33.8 ACUTE URINARY RETENTION: Status: ACTIVE | Noted: 2022-01-08

## 2022-01-08 LAB
ANION GAP SERPL CALCULATED.3IONS-SCNC: 8.2 MMOL/L (ref 5–15)
BUN SERPL-MCNC: 19 MG/DL (ref 8–23)
BUN/CREAT SERPL: 27.1 (ref 7–25)
CALCIUM SPEC-SCNC: 7.7 MG/DL (ref 8.6–10.5)
CHLORIDE SERPL-SCNC: 99 MMOL/L (ref 98–107)
CO2 SERPL-SCNC: 23.8 MMOL/L (ref 22–29)
CREAT SERPL-MCNC: 0.7 MG/DL (ref 0.76–1.27)
DEPRECATED RDW RBC AUTO: 43.2 FL (ref 37–54)
ERYTHROCYTE [DISTWIDTH] IN BLOOD BY AUTOMATED COUNT: 12.5 % (ref 12.3–15.4)
GFR SERPL CREATININE-BSD FRML MDRD: 115 ML/MIN/1.73
GLUCOSE SERPL-MCNC: 97 MG/DL (ref 65–99)
HCT VFR BLD AUTO: 32.9 % (ref 37.5–51)
HGB BLD-MCNC: 11.2 G/DL (ref 13–17.7)
MCH RBC QN AUTO: 32.4 PG (ref 26.6–33)
MCHC RBC AUTO-ENTMCNC: 34 G/DL (ref 31.5–35.7)
MCV RBC AUTO: 95.1 FL (ref 79–97)
PLATELET # BLD AUTO: 108 10*3/MM3 (ref 140–450)
PMV BLD AUTO: 8.7 FL (ref 6–12)
POTASSIUM SERPL-SCNC: 3.9 MMOL/L (ref 3.5–5.2)
RBC # BLD AUTO: 3.46 10*6/MM3 (ref 4.14–5.8)
SODIUM SERPL-SCNC: 131 MMOL/L (ref 136–145)
WBC NRBC COR # BLD: 10.53 10*3/MM3 (ref 3.4–10.8)

## 2022-01-08 PROCEDURE — 85027 COMPLETE CBC AUTOMATED: CPT | Performed by: INTERNAL MEDICINE

## 2022-01-08 PROCEDURE — 97110 THERAPEUTIC EXERCISES: CPT

## 2022-01-08 PROCEDURE — 25010000002 DEXAMETHASONE PER 1 MG: Performed by: NURSE PRACTITIONER

## 2022-01-08 PROCEDURE — 99024 POSTOP FOLLOW-UP VISIT: CPT | Performed by: NURSE PRACTITIONER

## 2022-01-08 PROCEDURE — 80048 BASIC METABOLIC PNL TOTAL CA: CPT | Performed by: INTERNAL MEDICINE

## 2022-01-08 RX ORDER — DOCUSATE SODIUM 100 MG/1
200 CAPSULE, LIQUID FILLED ORAL 2 TIMES DAILY
Status: DISCONTINUED | OUTPATIENT
Start: 2022-01-08 | End: 2022-01-10 | Stop reason: HOSPADM

## 2022-01-08 RX ORDER — DEXAMETHASONE SODIUM PHOSPHATE 4 MG/ML
4 INJECTION, SOLUTION INTRA-ARTICULAR; INTRALESIONAL; INTRAMUSCULAR; INTRAVENOUS; SOFT TISSUE EVERY 6 HOURS
Status: COMPLETED | OUTPATIENT
Start: 2022-01-08 | End: 2022-01-09

## 2022-01-08 RX ORDER — TAMSULOSIN HYDROCHLORIDE 0.4 MG/1
0.4 CAPSULE ORAL DAILY
Status: DISCONTINUED | OUTPATIENT
Start: 2022-01-08 | End: 2022-01-10 | Stop reason: HOSPADM

## 2022-01-08 RX ORDER — BISACODYL 10 MG
10 SUPPOSITORY, RECTAL RECTAL DAILY
Status: DISCONTINUED | OUTPATIENT
Start: 2022-01-08 | End: 2022-01-10 | Stop reason: HOSPADM

## 2022-01-08 RX ORDER — METHOCARBAMOL 750 MG/1
750 TABLET, FILM COATED ORAL EVERY 6 HOURS SCHEDULED
Status: COMPLETED | OUTPATIENT
Start: 2022-01-08 | End: 2022-01-10

## 2022-01-08 RX ORDER — BISACODYL 10 MG
10 SUPPOSITORY, RECTAL RECTAL DAILY
Status: DISCONTINUED | OUTPATIENT
Start: 2022-01-08 | End: 2022-01-08 | Stop reason: SDUPTHER

## 2022-01-08 RX ADMIN — HYDROCODONE BITARTRATE AND ACETAMINOPHEN 2 TABLET: 7.5; 325 TABLET ORAL at 10:16

## 2022-01-08 RX ADMIN — POLYETHYLENE GLYCOL 3350 17 G: 17 POWDER, FOR SOLUTION ORAL at 08:26

## 2022-01-08 RX ADMIN — MONTELUKAST SODIUM 10 MG: 10 TABLET, FILM COATED ORAL at 08:26

## 2022-01-08 RX ADMIN — HYDROCODONE BITARTRATE AND ACETAMINOPHEN 2 TABLET: 7.5; 325 TABLET ORAL at 18:04

## 2022-01-08 RX ADMIN — PRAVASTATIN SODIUM 20 MG: 20 TABLET ORAL at 08:26

## 2022-01-08 RX ADMIN — SODIUM CHLORIDE, POTASSIUM CHLORIDE, SODIUM LACTATE AND CALCIUM CHLORIDE 75 ML/HR: 600; 310; 30; 20 INJECTION, SOLUTION INTRAVENOUS at 10:16

## 2022-01-08 RX ADMIN — DOCUSATE SODIUM 100 MG: 100 CAPSULE, LIQUID FILLED ORAL at 08:26

## 2022-01-08 RX ADMIN — METHOCARBAMOL TABLETS 750 MG: 750 TABLET, COATED ORAL at 14:26

## 2022-01-08 RX ADMIN — DEXAMETHASONE SODIUM PHOSPHATE 4 MG: 4 INJECTION, SOLUTION INTRA-ARTICULAR; INTRALESIONAL; INTRAMUSCULAR; INTRAVENOUS; SOFT TISSUE at 21:14

## 2022-01-08 RX ADMIN — DEXAMETHASONE SODIUM PHOSPHATE 4 MG: 4 INJECTION, SOLUTION INTRA-ARTICULAR; INTRALESIONAL; INTRAMUSCULAR; INTRAVENOUS; SOFT TISSUE at 15:01

## 2022-01-08 RX ADMIN — HYDROCODONE BITARTRATE AND ACETAMINOPHEN 2 TABLET: 7.5; 325 TABLET ORAL at 06:16

## 2022-01-08 RX ADMIN — HYDROCODONE BITARTRATE AND ACETAMINOPHEN 1 TABLET: 7.5; 325 TABLET ORAL at 01:08

## 2022-01-08 RX ADMIN — METOPROLOL SUCCINATE 25 MG: 25 TABLET, EXTENDED RELEASE ORAL at 08:26

## 2022-01-08 RX ADMIN — PANTOPRAZOLE SODIUM 40 MG: 40 TABLET, DELAYED RELEASE ORAL at 08:26

## 2022-01-08 RX ADMIN — METHOCARBAMOL TABLETS 750 MG: 750 TABLET, COATED ORAL at 18:04

## 2022-01-08 RX ADMIN — SODIUM CHLORIDE, PRESERVATIVE FREE 10 ML: 5 INJECTION INTRAVENOUS at 21:15

## 2022-01-08 RX ADMIN — DOCUSATE SODIUM 200 MG: 100 CAPSULE, LIQUID FILLED ORAL at 21:14

## 2022-01-08 RX ADMIN — CYCLOBENZAPRINE 10 MG: 10 TABLET, FILM COATED ORAL at 08:26

## 2022-01-08 RX ADMIN — TAMSULOSIN HYDROCHLORIDE 0.4 MG: 0.4 CAPSULE ORAL at 18:04

## 2022-01-08 NOTE — PLAN OF CARE
Goal Outcome Evaluation:  Plan of Care Reviewed With: patient        Progress: improving  Outcome Summary: VSS, bilateral n/t/pain legs, ambulating assist of 1 with walker, voiding per BRP-checking residuals (242 last), pain continues to be an issue PCA and orals being used, refused suppository today-passing gas, bowel sounds present, plan to go home when pain controlled and medically stable

## 2022-01-08 NOTE — PROGRESS NOTES
NEUROSURGERY POST OP PROGRESS NOTE     LOS: 2 days   Patient Care Team:  Daniel Johnson MD as PCP - General (Family Medicine)    Subjective     Interval History: Walked out in paz earlier. Has brace. C/O burning pain across the top of both ankles. Likely post op neuritis pain. Only voiding small amount and required I/O cath in early morning hours. Denies any urinary discomfort. Not passing gas.     History taken from: patient chart    Objective        Vital Signs  Temp:  [97.5 °F (36.4 °C)-98.1 °F (36.7 °C)] 97.5 °F (36.4 °C)  Heart Rate:  [62-70] 70  Resp:  [16] 16  BP: (105-129)/(64-76) 105/65       Physical Exam:     Lumbar incision well approximated. Dressing dry and intact.   SALIMA drain - 100 cc - bloody  No calf swelling or pain to bilateral palpation.  Abdomen distended but soft.        Results Review:     I reviewed the patient's new clinical results.     .  Results from last 7 days   Lab Units 01/08/22  0513 01/07/22  0631   WBC 10*3/mm3 10.53 13.57*   HEMOGLOBIN g/dL 11.2* 12.4*   HEMATOCRIT % 32.9* 34.8*   PLATELETS 10*3/mm3 108* 137*       Assessment/Plan       Spinal stenosis of lumbar region with radiculopathy    Congenital spondylolysis, lumbosacral region    Spinal stenosis of lumbar region with neurogenic claudication    POD 2 open lumbar decompression L4/5, Crump procedure L5/S1, PLIF w bilateral cages L4/5, L5/S1 and pedicle screw/susan/crosslink fixation L4, L5, S1 with Mazor robot  Post op leukocytosis - resolved  Post op neuritis - short course IV decadron  Expected lumbar spasms      Routine Robaxin for spasm control.   Mobilize   Keep drain  Nurse to check bladder scan now and consult urology if requires I/O cath  Work on bowels.       Ambar Haynes, HERO  01/08/22  10:53 EST

## 2022-01-08 NOTE — PROGRESS NOTES
"    Name: Christiano Barr ADMIT: 2022   : 1960  PCP: Daniel Johnson MD    MRN: [id number] LOS: 2 days   AGE/SEX: 61 y.o. male  ROOM: [facility]     Subjective   Subjective   Patient appears obese, generally weak, relatively comfortable, no apparent distress.  Reports \"burning\" pain in right lower extremity and urinary urgency with incomplete void.  Denies burning pain with urine.    Review of Systems   Constitutional: Negative for chills and fever.   Respiratory: Negative for cough and shortness of breath.    Cardiovascular: Negative for chest pain and leg swelling.   Gastrointestinal: Negative for abdominal pain, constipation, diarrhea, nausea and vomiting.   Genitourinary: Positive for difficulty urinating (improving) and urgency. Negative for dysuria and flank pain.   Musculoskeletal: Positive for gait problem.   Neurological: Positive for weakness.   Psychiatric/Behavioral: Negative for confusion.        Objective   Objective   Vital Signs  Temp:  [97.3 °F (36.3 °C)-98.1 °F (36.7 °C)] 97.3 °F (36.3 °C)  Heart Rate:  [62-70] 66  Resp:  [16] 16  BP: (105-130)/(64-78) 130/78  SpO2:  [95 %-98 %] 95 %  on   ;   Device (Oxygen Therapy): room air  Body mass index is 33.25 kg/m².     Physical Exam  Constitutional:       General: He is not in acute distress.     Appearance: He is obese. He is not toxic-appearing.   HENT:      Head: Normocephalic and atraumatic.   Cardiovascular:      Rate and Rhythm: Normal rate.      Heart sounds: Normal heart sounds.   Pulmonary:      Effort: Pulmonary effort is normal.      Breath sounds: Normal breath sounds.   Abdominal:      General: There is distension.      Palpations: Abdomen is soft.      Tenderness: There is no abdominal tenderness. There is no guarding.      Comments: hypoactive   Musculoskeletal:         General: Tenderness (RLE) present.      Cervical back: Normal range of motion and neck supple.      Right lower leg: No edema.      Left lower leg: No " edema.   Skin:     General: Skin is warm and dry.   Neurological:      Mental Status: He is alert and oriented to person, place, and time.      Cranial Nerves: No cranial nerve deficit (generalized).   Psychiatric:         Behavior: Behavior normal.         Thought Content: Thought content normal.         Results Review     I reviewed the patient's new clinical results.  Results from last 7 days   Lab Units 01/08/22  0513 01/07/22  0631   WBC 10*3/mm3 10.53 13.57*   HEMOGLOBIN g/dL 11.2* 12.4*   PLATELETS 10*3/mm3 108* 137*     Results from last 7 days   Lab Units 01/08/22  0513 01/07/22  0631   SODIUM mmol/L 131* 134*   POTASSIUM mmol/L 3.9 3.8   CHLORIDE mmol/L 99 102   CO2 mmol/L 23.8 22.3   BUN mg/dL 19 23   CREATININE mg/dL 0.70* 0.95   GLUCOSE mg/dL 97 148*   EGFR IF NONAFRICN AM mL/min/1.73 115 81       Results from last 7 days   Lab Units 01/08/22  0513 01/07/22  0631   CALCIUM mg/dL 7.7* 7.9*       COVID19   Date Value Ref Range Status   01/05/2022 Not Detected Not Detected - Ref. Range Final   12/22/2021 Not Detected Not Detected - Ref. Range Final     Glucose   Date/Time Value Ref Range Status   01/06/2022 0640 100 70 - 130 mg/dL Final     Comment:     Meter: [id number] : [id number] [place] CHERRI       XR Spine Lumbar 2 or 3 View  Narrative: LUMBAR SPINE OPERATIVE X-RAYS     HISTORY: Back pain.     TECHNIQUE: 56 seconds of fluoroscopy was provided operatively for Dr. Brewer. Five fluoroscopic images were saved.     FINDINGS: Images provided show level localization and subsequent  application of pedicle screws at L4, L5, S1, and intervertebral cages at  the L4-5 and L5-S1 levels.     Impression: Intraoperative imaging was provided for Dr. Brewer  during lumbar spine surgery.     This report was finalized on 1/6/2022 2:34 PM by SILVIO Rodrigues Arm During Surgery  This procedure was auto-finalized with no dictation required.    Scheduled Medications  bisacodyl, 10  mg, Rectal, Daily  dexamethasone, 4 mg, Intravenous, Q6H  docusate sodium, 200 mg, Oral, BID  losartan-HCTZ (HYZAAR) 50-12.5 combo dose, , Oral, Daily  methocarbamol, 750 mg, Oral, Q6H  metoprolol succinate XL, 25 mg, Oral, Q24H  montelukast, 10 mg, Oral, Daily  pantoprazole, 40 mg, Oral, Daily  polyethylene glycol, 17 g, Oral, Daily  pravastatin, 20 mg, Oral, Daily  sodium chloride, 10 mL, Intravenous, Q12H  tamsulosin, 0.4 mg, Oral, Daily    Infusions  HYDROmorphone HCl-NaCl,     Diet  Diet Regular       Assessment/Plan     Active Hospital Problems    Diagnosis  POA   • **Acute urinary retention [R33.8]  Unknown   • Spinal stenosis of lumbar region with neurogenic claudication [M48.062]  Yes   • Congenital spondylolysis, lumbosacral region [Q76.2]  Unknown   • Spinal stenosis of lumbar region with radiculopathy [M48.061, M54.16]  Unknown      Resolved Hospital Problems   No resolved problems to display.       61 y.o. male admitted with Acute urinary retention.    Complaints of urinary urgency & frequency following surgery.  Plan to start Flomax 0.4 mg PO daily given BP acceptable & watch for BP tolerance & outcomes regarding urine output.  Denies dysuria so low suspicion for infection.     BP acceptable recent changes to her ARB/HCTZ.  Tolerating p.o.; therefore, will discontinue continuous IV fluids.  Glucose acceptable, consistent.  WBC count normalized.  Tolerating CPAP at night.  I-S encouraged.    · SCD for DVT prophylaxis.  · CPR  · Discussed with Dr. Aleman.  · Anticipate discharge plan home at [place]      HERO Jordan  [facility]  01/08/22  15:09 EST

## 2022-01-08 NOTE — THERAPY TREATMENT NOTE
Patient Name: Christiano Barr  : 1960    MRN: [id number]                              Today's Date: 2022       Admit Date: 2022    Visit Dx:     ICD-10-CM ICD-9-CM   1. Status post surgery  Z98.890 V45.89   2. Spinal stenosis of lumbar region with neurogenic claudication  M48.062 724.03   3. Congenital spondylolysis, lumbosacral region  Q76.2 756.11     Patient Active Problem List   Diagnosis   • Primary osteoarthritis of right knee   • Acute midline low back pain with right-sided sciatica   • GERD without esophagitis   • HTN (hypertension)   • Obesity (BMI 30-39.9)   • Spinal stenosis of lumbar region with radiculopathy   • Numbness and tingling of right leg   • Epidural lipomatosis   • Prediabetes   • Congenital spondylolysis, lumbosacral region   • Spinal stenosis of lumbar region with neurogenic claudication     Past Medical History:   Diagnosis Date   • Acid reflux    • Arthritis     OSTEO   • Bradycardia     40's-50's    • Congenital spondylolysis, lumbosacral region    • Hypertension    • Low back pain    • Peripheral neuropathy    • Pre-diabetes    • Risk factors for obstructive sleep apnea     YOAN 7   • Sleep apnea     instructed to bring cpap for surgery    • Spinal stenosis of lumbar region with radiculopathy      Past Surgical History:   Procedure Laterality Date   • APPENDECTOMY     • INGUINAL HERNIA REPAIR Right    • TOTAL KNEE ARTHROPLASTY Left 2018    Procedure: LT TOTAL KNEE ARTHROPLASTY;  Surgeon: Ralph Panchal MD;  Location: [facility] OR;  Service:       General Information     Row Name 22 0936          Physical Therapy Time and Intention    Document Type therapy note (daily note)  -     Mode of Treatment physical therapy; individual therapy  -     Row Name 22 0936          General Information    Patient Profile Reviewed yes  -     Existing Precautions/Restrictions spinal; brace worn when out of bed   back brace  -     Row Name 22 0936           Cognition    Orientation Status (Cognition) oriented x 3  -     Row Name 01/08/22 0936          Safety Issues, Functional Mobility    Safety Issues Affecting Function (Mobility) safety precaution awareness; safety precautions follow-through/compliance  -     Impairments Affecting Function (Mobility) balance; pain; shortness of breath; strength  -     Comment, Safety Issues/Impairments (Mobility) fall prevention program maintained  -           User Key  (r) = Recorded By, (t) = Taken By, (c) = Cosigned By    Initials Name Provider Type    Yaa Zhu, PT Physical Therapist               Mobility     Row Name 01/08/22 0939          Bed Mobility    Bed Mobility supine-sit; sit-supine  -     Supine-Sit [place] (Bed Mobility) contact guard; verbal cues; nonverbal cues (demo/gesture)  -     Sit-Supine [place] (Bed Mobility) contact guard; minimum assist (75% patient effort); verbal cues; nonverbal cues (demo/gesture)  assist with BLE  -     Comment (Bed Mobility) via log roll; once sitting EOB assisted pt in donning back brace; pt reports he is renting a hospital bed for home  -     Row Name 01/08/22 0939          Sit-Stand Transfer    Sit-Stand [place] (Transfers) contact guard; verbal cues  -     Assistive Device (Sit-Stand Transfers) walker, front-wheeled  -     Row Name 01/08/22 0939          Gait/Stairs (Locomotion)    [place] Level (Gait) contact guard  -     Assistive Device (Gait) walker, front-wheeled  -     Distance in Feet (Gait) 400 ft.  -     Deviations/Abnormal Patterns (Gait) antalgic; artie decreased; stride length decreased  -           User Key  (r) = Recorded By, (t) = Taken By, (c) = Cosigned By    Initials Name Provider Type    Yaa Zhu, PT Physical Therapist               Obj/Interventions     Row Name 01/08/22 0941          Motor Skills    Therapeutic Exercise --  10 reps seated AP, LAQ, jovani B; reviewed supine QS, GS,  and hip abduction/adduction  -[facility] Name 01/08/22 0941          Balance    Balance Assessment sitting static balance; standing static balance; standing dynamic balance  -     Static Sitting Balance WFL  -     Dynamic Sitting Balance WFL  -     Static Standing Balance WFL; supported  -     Dynamic Standing Balance WFL; supported  -           User Key  (r) = Recorded By, (t) = Taken By, (c) = Cosigned By    Initials Name Provider Type     Yaa Yi, PT Physical Therapist               Goals/Plan    No documentation.                Clinical Impression     [facility] Name 01/08/22 0942          Pain    Additional Documentation Pain Scale: Numbers Pre/Post-Treatment (Group)  -[facility] Name 01/08/22 0942          Pain Scale: Numbers Pre/Post-Treatment    Pretreatment Pain Rating 6/10  -     Posttreatment Pain Rating 6/10  -     Pain Location - Orientation incisional  -     Pain Location back  -     Pre/Posttreatment Pain Comment pt tolerated session but c/o increased pain with activity  -     Pain Intervention(s) Medication (See MAR); Ambulation/increased activity; Repositioned  -[facility] Name 01/08/22 0942          Plan of Care Review    Plan of Care Reviewed With patient  -     Progress improving  -     Outcome Summary Pt demonstrated improved mobility ambulating increased distance. He required review as well as V/C to log roll for bed mobility and review of spinal precautions. Pt performed all mobility with CGA-min A and RWX. Pt ambulated 400 ft. and completed BLE seated exercises for generalized strengthening. Pt may benefit from continued skilled PT.  -[facility] Name 01/08/22 0942          Vital Signs    O2 Delivery Pre Treatment room air  -     O2 Delivery Intra Treatment room air  -     O2 Delivery Post Treatment room air  -[facility] Name 01/08/22 0942          Positioning and Restraints    Pre-Treatment Position in bed  -     Post Treatment Position bed  -     In Bed  supine; call light within reach; encouraged to call for assist  -MOHAMUD           User Key  (r) = Recorded By, (t) = Taken By, (c) = Cosigned By    Initials Name Provider Type    Yaa Zhu PT Physical Therapist               Outcome Measures     Row Name 01/08/22 0947          How much help from another person do you currently need...    Turning from your back to your side while in flat bed without using bedrails? 3  -KH     Moving from lying on back to sitting on the side of a flat bed without bedrails? 3  -KH     Moving to and from a bed to a chair (including a wheelchair)? 3  -KH     Standing up from a chair using your arms (e.g., wheelchair, bedside chair)? 3  -KH     Climbing 3-5 steps with a railing? 3  -KH     To walk in hospital room? 3  -KH     AM-PAC 6 Clicks Score (PT) 18  -KH     Row Name 01/08/22 0947          Functional Assessment    Outcome Measure Options AM-PAC 6 Clicks Basic Mobility (PT)  -MOHAMUD           User Key  (r) = Recorded By, (t) = Taken By, (c) = Cosigned By    Initials Name Provider Type    Yaa Zhu, MALI Physical Therapist                             Physical Therapy Education                 Title: PT OT SLP Therapies (Done)     Topic: Physical Therapy (Done)     Point: Mobility training (Done)     Learning Progress Summary           Patient Acceptance, E, VU,NR by  at 1/8/2022 0947    Acceptance, E,D, VU,NR by MS at 1/7/2022 1149                   Point: Home exercise program (Done)     Learning Progress Summary           Patient Acceptance, E, VU,NR by  at 1/8/2022 0947    Acceptance, E,D, VU,NR by MS at 1/7/2022 1149                   Point: Body mechanics (Done)     Learning Progress Summary           Patient Acceptance, E, VU,NR by  at 1/8/2022 0947    Acceptance, E,D, VU,NR by MS at 1/7/2022 1149                   Point: Precautions (Done)     Learning Progress Summary           Patient Acceptance, E, VU,NR by  at 1/8/2022 0947    Acceptance, E,D, VU,NR by MS  at 1/7/2022 1149                               User Key     Initials Effective Dates Name Provider Type Discipline     06/16/21 -  Yaa Yi, PT Physical Therapist PT    MS 06/16/21 -  Rg Aleman PT Physical Therapist PT              PT Recommendation and Plan     Plan of Care Reviewed With: patient  Progress: improving  Outcome Summary: Pt demonstrated improved mobility ambulating increased distance. He required review as well as V/C to log roll for bed mobility and review of spinal precautions. Pt performed all mobility with CGA-min A and RWX. Pt ambulated 400 ft. and completed BLE seated exercises for generalized strengthening. Pt may benefit from continued skilled PT.     Time Calculation:    PT Charges     Row Name 01/08/22 0948             Time Calculation    Start Time 0907  -      Stop Time 0934  -      Time Calculation (min) 27 min  -      PT Received On 01/08/22  -      PT - Next Appointment 01/09/22  -            User Key  (r) = Recorded By, (t) = Taken By, (c) = Cosigned By    Initials Name Provider Type     Yaa Yi, PT Physical Therapist              Therapy Charges for Today     Code Description Service Date Service Provider Modifiers Qty    59388631583 HC PT THER PROC EA 15 MIN 1/8/2022 Yaa Yi, PT GP 2          PT G-Codes  Outcome Measure Options: AM-PAC 6 Clicks Basic Mobility (PT)  AM-PAC 6 Clicks Score (PT): 18    Yaa Yi PT  1/8/2022

## 2022-01-08 NOTE — PLAN OF CARE
Goal Outcome Evaluation:  Plan of Care Reviewed With: patient        Progress: improving  Outcome Summary: Pt demonstrated improved mobility ambulating increased distance. He required review as well as V/C to log roll for bed mobility and review of spinal precautions. Pt performed all mobility with CGA-min A and RWX. Pt ambulated 400 ft. and completed BLE seated exercises for generalized strengthening. Pt may benefit from continued skilled PT.    Patient was intermittently wearing a face mask during this therapy encounter. Therapist used appropriate personal protective equipment including eye protection, mask, and gloves.  Mask used was standard procedure mask. Appropriate PPE was worn during the entire therapy session. Hand hygiene was completed before and after therapy session. Patient is not in enhanced droplet precautions.

## 2022-01-08 NOTE — PLAN OF CARE
Goal Outcome Evaluation:  Plan of Care Reviewed With: patient        Progress: improving  Outcome Summary: Pt remains stable. Ambulates with walker, back brace, and 1 assist. Dressing to back is cdi with SALIMA drain. PCA pump minimally used. Also taking Norco and flexeril PRN. Voiding after f/c removed. Meds given for consitpation. Educated on htn management. D/C plan pending. WCTM.

## 2022-01-09 PROCEDURE — 97110 THERAPEUTIC EXERCISES: CPT

## 2022-01-09 PROCEDURE — 99024 POSTOP FOLLOW-UP VISIT: CPT | Performed by: NEUROLOGICAL SURGERY

## 2022-01-09 PROCEDURE — 25010000002 DEXAMETHASONE PER 1 MG: Performed by: NURSE PRACTITIONER

## 2022-01-09 RX ORDER — AMOXICILLIN 250 MG
2 CAPSULE ORAL 2 TIMES DAILY
Status: DISCONTINUED | OUTPATIENT
Start: 2022-01-09 | End: 2022-01-10 | Stop reason: HOSPADM

## 2022-01-09 RX ADMIN — DEXAMETHASONE SODIUM PHOSPHATE 4 MG: 4 INJECTION, SOLUTION INTRA-ARTICULAR; INTRALESIONAL; INTRAMUSCULAR; INTRAVENOUS; SOFT TISSUE at 03:09

## 2022-01-09 RX ADMIN — HYDROCODONE BITARTRATE AND ACETAMINOPHEN 2 TABLET: 7.5; 325 TABLET ORAL at 07:39

## 2022-01-09 RX ADMIN — MONTELUKAST SODIUM 10 MG: 10 TABLET, FILM COATED ORAL at 09:57

## 2022-01-09 RX ADMIN — PRAVASTATIN SODIUM 20 MG: 20 TABLET ORAL at 09:57

## 2022-01-09 RX ADMIN — TAMSULOSIN HYDROCHLORIDE 0.4 MG: 0.4 CAPSULE ORAL at 09:57

## 2022-01-09 RX ADMIN — DEXAMETHASONE SODIUM PHOSPHATE 4 MG: 4 INJECTION, SOLUTION INTRA-ARTICULAR; INTRALESIONAL; INTRAMUSCULAR; INTRAVENOUS; SOFT TISSUE at 09:07

## 2022-01-09 RX ADMIN — DOCUSATE SODIUM 200 MG: 100 CAPSULE, LIQUID FILLED ORAL at 09:57

## 2022-01-09 RX ADMIN — METHOCARBAMOL TABLETS 750 MG: 750 TABLET, COATED ORAL at 13:08

## 2022-01-09 RX ADMIN — HYDROCODONE BITARTRATE AND ACETAMINOPHEN 2 TABLET: 7.5; 325 TABLET ORAL at 13:08

## 2022-01-09 RX ADMIN — METHOCARBAMOL TABLETS 750 MG: 750 TABLET, COATED ORAL at 09:57

## 2022-01-09 RX ADMIN — LOSARTAN POTASSIUM: 50 TABLET, FILM COATED ORAL at 14:48

## 2022-01-09 RX ADMIN — DOCUSATE SODIUM 50MG AND SENNOSIDES 8.6MG 2 TABLET: 8.6; 5 TABLET, FILM COATED ORAL at 22:46

## 2022-01-09 RX ADMIN — POLYETHYLENE GLYCOL 3350 17 G: 17 POWDER, FOR SOLUTION ORAL at 09:57

## 2022-01-09 RX ADMIN — METOPROLOL SUCCINATE 25 MG: 25 TABLET, EXTENDED RELEASE ORAL at 09:57

## 2022-01-09 RX ADMIN — METHOCARBAMOL TABLETS 750 MG: 750 TABLET, COATED ORAL at 17:30

## 2022-01-09 RX ADMIN — METHOCARBAMOL TABLETS 750 MG: 750 TABLET, COATED ORAL at 03:08

## 2022-01-09 RX ADMIN — HYDROCODONE BITARTRATE AND ACETAMINOPHEN 2 TABLET: 7.5; 325 TABLET ORAL at 17:30

## 2022-01-09 RX ADMIN — PANTOPRAZOLE SODIUM 40 MG: 40 TABLET, DELAYED RELEASE ORAL at 09:57

## 2022-01-09 RX ADMIN — DOCUSATE SODIUM 200 MG: 100 CAPSULE, LIQUID FILLED ORAL at 22:47

## 2022-01-09 NOTE — PROGRESS NOTES
Name: Christiano Barr ADMIT: 2022   : 1960  PCP: Daniel Johnson MD    MRN: [id number] LOS: 3 days   AGE/SEX: 61 y.o. male  ROOM: [facility]     Subjective   Subjective   Patient appears obese, generally weak, relatively comfortable, no apparent distress.  Reports urinating better today.  Denies burning pain with urine.    Review of Systems   Constitutional: Negative for chills and fever.   Respiratory: Negative for cough and shortness of breath.    Cardiovascular: Negative for chest pain and leg swelling.   Gastrointestinal: Positive for constipation. Negative for abdominal pain, diarrhea, nausea and vomiting.   Genitourinary: Positive for difficulty urinating (improving) and urgency. Negative for dysuria and flank pain.   Musculoskeletal: Positive for gait problem.   Neurological: Positive for weakness.   Psychiatric/Behavioral: Negative for confusion.        Objective   Objective   Vital Signs  Temp:  [97.1 °F (36.2 °C)-97.7 °F (36.5 °C)] 97.1 °F (36.2 °C)  Heart Rate:  [70-79] 72  Resp:  [16] 16  BP: (126-149)/(71-82) 149/82  SpO2:  [94 %-95 %] 95 %  on   ;   Device (Oxygen Therapy): room air  Body mass index is 33.25 kg/m².     Physical Exam  Constitutional:       General: He is not in acute distress.     Appearance: He is obese. He is not toxic-appearing.   HENT:      Head: Normocephalic and atraumatic.   Cardiovascular:      Rate and Rhythm: Normal rate.      Heart sounds: Normal heart sounds.   Pulmonary:      Effort: Pulmonary effort is normal.      Breath sounds: Normal breath sounds.   Abdominal:      General: There is distension.      Palpations: Abdomen is soft.      Tenderness: There is no abdominal tenderness. There is no guarding.      Comments: hypoactive   Musculoskeletal:         General: Tenderness (RLE) present.      Cervical back: Normal range of motion and neck supple.      Right lower leg: No edema.      Left lower leg: No edema.   Skin:     General: Skin is warm and  dry.   Neurological:      Mental Status: He is alert and oriented to person, place, and time.      Cranial Nerves: No cranial nerve deficit (generalized).   Psychiatric:         Behavior: Behavior normal.         Thought Content: Thought content normal.         Results Review     I reviewed the patient's new clinical results.  Results from last 7 days   Lab Units 01/08/22  0513 01/07/22  0631   WBC 10*3/mm3 10.53 13.57*   HEMOGLOBIN g/dL 11.2* 12.4*   PLATELETS 10*3/mm3 108* 137*     Results from last 7 days   Lab Units 01/08/22  0513 01/07/22  0631   SODIUM mmol/L 131* 134*   POTASSIUM mmol/L 3.9 3.8   CHLORIDE mmol/L 99 102   CO2 mmol/L 23.8 22.3   BUN mg/dL 19 23   CREATININE mg/dL 0.70* 0.95   GLUCOSE mg/dL 97 148*   EGFR IF NONAFRICN AM mL/min/1.73 115 81       Results from last 7 days   Lab Units 01/08/22  0513 01/07/22  0631   CALCIUM mg/dL 7.7* 7.9*       COVID19   Date Value Ref Range Status   01/05/2022 Not Detected Not Detected - Ref. Range Final   12/22/2021 Not Detected Not Detected - Ref. Range Final     No results found for: HGBA1C, POCGLU    XR Spine Lumbar 2 or 3 View  Narrative: LUMBAR SPINE OPERATIVE X-RAYS     HISTORY: Back pain.     TECHNIQUE: 56 seconds of fluoroscopy was provided operatively for Dr. Brewer. Five fluoroscopic images were saved.     FINDINGS: Images provided show level localization and subsequent  application of pedicle screws at L4, L5, S1, and intervertebral cages at  the L4-5 and L5-S1 levels.     Impression: Intraoperative imaging was provided for Dr. Brewer  during lumbar spine surgery.     This report was finalized on 1/6/2022 2:34 PM by Dr. Christiano Long M.D.     FL C Arm During Surgery  This procedure was auto-finalized with no dictation required.    Scheduled Medications  bisacodyl, 10 mg, Rectal, Daily  docusate sodium, 200 mg, Oral, BID  losartan-HCTZ (HYZAAR) 100-25 combo dose, , Oral, Daily  methocarbamol, 750 mg, Oral, Q6H  metoprolol succinate XL, 25  mg, Oral, Q24H  montelukast, 10 mg, Oral, Daily  pantoprazole, 40 mg, Oral, Daily  polyethylene glycol, 17 g, Oral, Daily  pravastatin, 20 mg, Oral, Daily  senna-docusate sodium, 2 tablet, Oral, BID  sodium chloride, 10 mL, Intravenous, Q12H  tamsulosin, 0.4 mg, Oral, Daily    Infusions   Diet  Diet Regular       Assessment/Plan     Active Hospital Problems    Diagnosis  POA   • **Acute urinary retention [R33.8]  Unknown   • Spinal stenosis of lumbar region with neurogenic claudication [M48.062]  Yes   • Congenital spondylolysis, lumbosacral region [Q76.2]  Unknown   • Spinal stenosis of lumbar region with radiculopathy [M48.061, M54.16]  Unknown      Resolved Hospital Problems   No resolved problems to display.       61 y.o. male admitted with Acute urinary retention.    Flomax 0.4 mg PO daily helping urinary retention.  Denies dysuria so low suspicion for infection.     BP increasing; therefore, plan to return to home regimen ARB/HCTZ.  Tolerating p.o.  Glucose acceptable, consistent.  WBC count normalized.  Tolerating CPAP at night.  I-S encouraged.    · SCD for DVT prophylaxis.  · CPR  · Discussed with Dr. Aleman.  · Anticipate discharge plan home at [place]      HERO Jordan  [facility]  01/09/22  15:40 EST

## 2022-01-09 NOTE — PROGRESS NOTES
POD 3  S/p L4-S1 decompression/PLIF with cages and pedicle screws  Vitals:    01/08/22 1100 01/08/22 1842 01/08/22 2236 01/09/22 0710   BP: 130/78 149/80 126/71 149/82   BP Location: Left arm Left arm Left arm Left arm   Patient Position: Lying Sitting Lying Lying   Pulse: 66 70 79 72   Resp: 16 16 16 16   Temp: 97.3 °F (36.3 °C) 97.7 °F (36.5 °C) 97.7 °F (36.5 °C) 97.1 °F (36.2 °C)   TempSrc: Skin Oral Skin Skin   SpO2: 95%  94% 95%   Weight:       Height:       Drain to be removed  Legs feeling better  Is ambulating around the nurses station  A hospital bed has been ordered for home  Dressing dry  He can shower  Probably home tomorrow with home health  Lab Results   Component Value Date    GLUCOSE 97 01/08/2022    BUN 19 01/08/2022    CREATININE 0.70 (L) 01/08/2022    EGFRIFNONA 115 01/08/2022    BCR 27.1 (H) 01/08/2022    K 3.9 01/08/2022    CO2 23.8 01/08/2022    CALCIUM 7.7 (L) 01/08/2022    ALBUMIN 4.50 12/22/2021    AST 20 12/22/2021    ALT 38 12/22/2021     WBC   Date Value Ref Range Status   01/08/2022 10.53 3.40 - 10.80 10*3/mm3 Final     RBC   Date Value Ref Range Status   01/08/2022 3.46 (L) 4.14 - 5.80 10*6/mm3 Final     Hemoglobin   Date Value Ref Range Status   01/08/2022 11.2 (L) 13.0 - 17.7 g/dL Final     Hematocrit   Date Value Ref Range Status   01/08/2022 32.9 (L) 37.5 - 51.0 % Final     MCV   Date Value Ref Range Status   01/08/2022 95.1 79.0 - 97.0 fL Final     MCH   Date Value Ref Range Status   01/08/2022 32.4 26.6 - 33.0 pg Final     MCHC   Date Value Ref Range Status   01/08/2022 34.0 31.5 - 35.7 g/dL Final     RDW   Date Value Ref Range Status   01/08/2022 12.5 12.3 - 15.4 % Final     RDW-SD   Date Value Ref Range Status   01/08/2022 43.2 37.0 - 54.0 fl Final     MPV   Date Value Ref Range Status   01/08/2022 8.7 6.0 - 12.0 fL Final     Platelets   Date Value Ref Range Status   01/08/2022 108 (L) 140 - 450 10*3/mm3 Final     Neutrophil %   Date Value Ref Range Status   01/07/2022 86.1 (H)  42.7 - 76.0 % Final     Lymphocyte %   Date Value Ref Range Status   01/07/2022 6.0 (L) 19.6 - 45.3 % Final     Monocyte %   Date Value Ref Range Status   01/07/2022 6.5 5.0 - 12.0 % Final     Eosinophil %   Date Value Ref Range Status   01/07/2022 0.1 (L) 0.3 - 6.2 % Final     Basophil %   Date Value Ref Range Status   01/07/2022 0.1 0.0 - 1.5 % Final     Immature Grans %   Date Value Ref Range Status   01/07/2022 1.2 (H) 0.0 - 0.5 % Final     Neutrophils, Absolute   Date Value Ref Range Status   01/07/2022 11.68 (H) 1.70 - 7.00 10*3/mm3 Final     Lymphocytes, Absolute   Date Value Ref Range Status   01/07/2022 0.82 0.70 - 3.10 10*3/mm3 Final     Monocytes, Absolute   Date Value Ref Range Status   01/07/2022 0.88 0.10 - 0.90 10*3/mm3 Final     Eosinophils, Absolute   Date Value Ref Range Status   01/07/2022 0.02 0.00 - 0.40 10*3/mm3 Final     Basophils, Absolute   Date Value Ref Range Status   01/07/2022 0.01 0.00 - 0.20 10*3/mm3 Final     Immature Grans, Absolute   Date Value Ref Range Status   01/07/2022 0.16 (H) 0.00 - 0.05 10*3/mm3 Final     nRBC   Date Value Ref Range Status   01/07/2022 0.0 0.0 - 0.2 /100 WBC Final

## 2022-01-09 NOTE — THERAPY TREATMENT NOTE
Patient Name: Christiano Barr  : 1960    MRN: [id number]                              Today's Date: 2022       Admit Date: 2022    Visit Dx:     ICD-10-CM ICD-9-CM   1. Status post surgery  Z98.890 V45.89   2. Spinal stenosis of lumbar region with neurogenic claudication  M48.062 724.03   3. Congenital spondylolysis, lumbosacral region  Q76.2 756.11     Patient Active Problem List   Diagnosis   • Primary osteoarthritis of right knee   • Acute midline low back pain with right-sided sciatica   • GERD without esophagitis   • HTN (hypertension)   • Obesity (BMI 30-39.9)   • Spinal stenosis of lumbar region with radiculopathy   • Numbness and tingling of right leg   • Epidural lipomatosis   • Prediabetes   • Congenital spondylolysis, lumbosacral region   • Spinal stenosis of lumbar region with neurogenic claudication   • Acute urinary retention     Past Medical History:   Diagnosis Date   • Acid reflux    • Arthritis     OSTEO   • Bradycardia     40's-50's    • Congenital spondylolysis, lumbosacral region    • Hypertension    • Low back pain    • Peripheral neuropathy    • Pre-diabetes    • Risk factors for obstructive sleep apnea     YOAN 7   • Sleep apnea     instructed to bring cpap for surgery    • Spinal stenosis of lumbar region with radiculopathy      Past Surgical History:   Procedure Laterality Date   • APPENDECTOMY     • INGUINAL HERNIA REPAIR Right    • TOTAL KNEE ARTHROPLASTY Left 2018    Procedure: LT TOTAL KNEE ARTHROPLASTY;  Surgeon: Ralph Panchal MD;  Location: [facility] OR;  Service:       General Information     Row Name 22 1438          Physical Therapy Time and Intention    Document Type therapy note (daily note)  -     Mode of Treatment physical therapy; individual therapy  -     Row Name 22 1438          General Information    Patient Profile Reviewed yes  -     Existing Precautions/Restrictions spinal; brace worn when out of bed   -     Row Name  01/09/22 1438          Cognition    Orientation Status (Cognition) oriented x 4  -     Row Name 01/09/22 1438          Safety Issues, Functional Mobility    Impairments Affecting Function (Mobility) pain; strength  -     Comment, Safety Issues/Impairments (Mobility) fall prevention program maintained  -           User Key  (r) = Recorded By, (t) = Taken By, (c) = Cosigned By    Initials Name Provider Type    Yaa Zhu, PT Physical Therapist               Mobility     Row Name 01/09/22 1439          Bed Mobility    Bed Mobility supine-sit  -KH     Supine-Sit Kenedy (Bed Mobility) modified independence  -     Sit-Supine Kenedy (Bed Mobility) not tested  -     Assistive Device (Bed Mobility) head of bed elevated  -     Comment (Bed Mobility) pt reports the surgeon told him he could use the hospital bed to sit him all the way up to get in/out of bed since he is going to have one at home  -     Row Name 01/09/22 1439          Sit-Stand Transfer    Sit-Stand Kenedy (Transfers) standby assist  -     Assistive Device (Sit-Stand Transfers) walker, front-wheeled  -     Row Name 01/09/22 1439          Gait/Stairs (Locomotion)    Kenedy Level (Gait) standby assist  -     Assistive Device (Gait) walker, front-wheeled  -KH     Distance in Feet (Gait) 800 ft.  -MOHAMUD     Deviations/Abnormal Patterns (Gait) artie decreased; stride length decreased  -KH     Bilateral Gait Deviations forward flexed posture  -           User Key  (r) = Recorded By, (t) = Taken By, (c) = Cosigned By    Initials Name Provider Type    Yaa Zhu, MALI Physical Therapist               Obj/Interventions     Row Name 01/09/22 1440          Motor Skills    Therapeutic Exercise --  10 reps B seated heel raises, LAQ, marching, hip abd/add  -MOHAMUD           User Key  (r) = Recorded By, (t) = Taken By, (c) = Cosigned By    Initials Name Provider Type    Yaa Zhu, MALI Physical Therapist                Goals/Plan    No documentation.                Clinical Impression     Row Name 01/09/22 1441          Pain    Additional Documentation Pain Scale: Numbers Pre/Post-Treatment (Group)  -     Row Name 01/09/22 1441          Pain Scale: Numbers Pre/Post-Treatment    Pretreatment Pain Rating 5/10  -     Posttreatment Pain Rating 5/10  -     Pain Location back  and B ankles  -     Pre/Posttreatment Pain Comment pt tolerated session  -     Pain Intervention(s) Medication (See MAR); Ambulation/increased activity; Repositioned  -     Row Name 01/09/22 1444 01/09/22 1441       Plan of Care Review    Plan of Care Reviewed With -- patient  -    Progress -- improving  -    Outcome Summary Pt demonstrated increased endurance/activity tolerance and strength with increased ambulation. Pt utilizes hospital bed to assist with bed mobility and performs transfers and ambulation with RWX and SBA. Pt ambulated 800 ft. and completed BLE strengthening exercises. Pt is appropriate to DC from PT and continue mobility with nsg and/or family.  - Pt demonstrated increased endurance/activity tolerance and strength with increased ambulation. Pt utilizes hospital bed to assist with bed mobility and performs transfers and ambulation with RWX and SBA. Pt ambulated 800 ft. and completed BLE strengthening exercises.  -    Row Name 01/09/22 1441          Vital Signs    O2 Delivery Pre Treatment room air  -     O2 Delivery Intra Treatment room air  -     O2 Delivery Post Treatment room air  -     Row Name 01/09/22 1441          Positioning and Restraints    Pre-Treatment Position in bed  -     Post Treatment Position bed  -     In Bed sitting EOB; call light within reach; encouraged to call for assist; [place] [facility]  -           User Key  (r) = Recorded By, (t) = Taken By, (c) = Cosigned By    Initials Name Provider Type    Yaa Zhu, PT Physical Therapist               Outcome Measures     Row Name  01/09/22 1443          How much help from another person do you currently need...    Turning from your back to your side while in flat bed without using bedrails? 3  -KH     Moving from lying on back to sitting on the side of a flat bed without bedrails? 3  -KH     Moving to and from a bed to a chair (including a wheelchair)? 3  -KH     Standing up from a chair using your arms (e.g., wheelchair, bedside chair)? 3  -KH     Climbing 3-5 steps with a railing? 3  -KH     To walk in hospital room? 3  -KH     AM-PAC 6 Clicks Score (PT) 18  -     Row Name 01/09/22 1443          Functional Assessment    Outcome Measure Options AM-PAC 6 Clicks Basic Mobility (PT)  -           User Key  (r) = Recorded By, (t) = Taken By, (c) = Cosigned By    Initials Name Provider Type    Yaa Zhu, PT Physical Therapist                             Physical Therapy Education                 Title: PT OT SLP Therapies (Done)     Topic: Physical Therapy (Done)     Point: Mobility training (Done)     Learning Progress Summary           Patient Acceptance, E, VU by  at 1/9/2022 1443    Acceptance, E, VU,NR by  at 1/8/2022 0947    Acceptance, E,D, VU,NR by MS at 1/7/2022 1149                   Point: Home exercise program (Done)     Learning Progress Summary           Patient Acceptance, E, VU by  at 1/9/2022 1443    Acceptance, E, VU,NR by  at 1/8/2022 0947    Acceptance, E,D, VU,NR by MS at 1/7/2022 1149                   Point: Body mechanics (Done)     Learning Progress Summary           Patient Acceptance, E, VU by  at 1/9/2022 1443    Acceptance, E, VU,NR by  at 1/8/2022 0947    Acceptance, E,D, VU,NR by MS at 1/7/2022 1149                   Point: Precautions (Done)     Learning Progress Summary           Patient Acceptance, E, VU by  at 1/9/2022 1443    Acceptance, E, VU,NR by  at 1/8/2022 0947    Acceptance, E,D, VU,NR by MS at 1/7/2022 1149                               User Key     Initials Effective Dates  Name Provider Type Discipline     06/16/21 -  Yaa iY, PT Physical Therapist PT    MS 06/16/21 -  Rg Aleman PT Physical Therapist PT              PT Recommendation and Plan     Plan of Care Reviewed With: patient  Progress: improving  Outcome Summary: Pt demonstrated increased endurance/activity tolerance and strength with increased ambulation. Pt utilizes hospital bed to assist with bed mobility and performs transfers and ambulation with RWX and SBA. Pt ambulated 800 ft. and completed BLE strengthening exercises. Pt is appropriate to DC from PT and continue mobility with nsg and/or family.     Time Calculation:    PT Charges     Row Name 01/09/22 1447             Time Calculation    Start Time 1415  -      Stop Time 1439  -      Time Calculation (min) 24 min  -      PT Received On 01/09/22  -            User Key  (r) = Recorded By, (t) = Taken By, (c) = Cosigned By    Initials Name Provider Type     Yaa Yi, PT Physical Therapist              Therapy Charges for Today     Code Description Service Date Service Provider Modifiers Qty    20776244249 HC PT THER PROC EA 15 MIN 1/8/2022 Yaa Yi, PT GP 2    78373107898 HC PT THER PROC EA 15 MIN 1/9/2022 Yaa Yi, PT GP 2          PT G-Codes  Outcome Measure Options: AM-PAC 6 Clicks Basic Mobility (PT)  AM-PAC 6 Clicks Score (PT): 18    Yaa Yi, PT  1/9/2022

## 2022-01-09 NOTE — PLAN OF CARE
Goal Outcome Evaluation:  Plan of Care Reviewed With: patient        Progress: improving  Outcome Summary: vss, nvi, dressing changed today, sarbjit out, ambulating up ad valente, voiding well per BRP, plan to dc home tomorrow, has hospital bed ordered and will be there tomorrow, educated on bp meds and monitoring

## 2022-01-09 NOTE — PLAN OF CARE
Problem: Adult Inpatient Plan of Care  Goal: Plan of Care Review     Goal Outcome Evaluation:  Plan of Care Reviewed With: patient        Progress: improving  Outcome Summary: Pt demonstrated increased endurance/activity tolerance and strength with increased ambulation. Pt utilizes hospital bed to assist with bed mobility and performs transfers and ambulation with RWX and SBA. Pt ambulated 800 ft. and completed BLE strengthening exercises. Pt is appropriate to DC from PT and continue mobility with nsg and/or family.    Patient was intermittently wearing a face mask during this therapy encounter. Therapist used appropriate personal protective equipment including eye protection, mask, and gloves.  Mask used was standard procedure mask. Appropriate PPE was worn during the entire therapy session. Hand hygiene was completed before and after therapy session. Patient is not in enhanced droplet precautions.

## 2022-01-10 ENCOUNTER — READMISSION MANAGEMENT (OUTPATIENT)
Dept: CALL CENTER | Facility: HOSPITAL | Age: 62
End: 2022-01-10

## 2022-01-10 ENCOUNTER — TELEPHONE (OUTPATIENT)
Dept: NEUROSURGERY | Facility: CLINIC | Age: 62
End: 2022-01-10

## 2022-01-10 VITALS
WEIGHT: 212.3 LBS | TEMPERATURE: 97.4 F | RESPIRATION RATE: 18 BRPM | OXYGEN SATURATION: 94 % | DIASTOLIC BLOOD PRESSURE: 78 MMHG | SYSTOLIC BLOOD PRESSURE: 119 MMHG | HEART RATE: 70 BPM | HEIGHT: 67 IN | BODY MASS INDEX: 33.32 KG/M2

## 2022-01-10 RX ORDER — CYCLOBENZAPRINE HCL 10 MG
10 TABLET ORAL 3 TIMES DAILY PRN
Qty: 30 TABLET | Refills: 0 | Status: SHIPPED | OUTPATIENT
Start: 2022-01-10 | End: 2022-01-20

## 2022-01-10 RX ORDER — TAMSULOSIN HYDROCHLORIDE 0.4 MG/1
0.4 CAPSULE ORAL DAILY
Qty: 30 CAPSULE | Refills: 0 | Status: SHIPPED | OUTPATIENT
Start: 2022-01-11

## 2022-01-10 RX ORDER — GABAPENTIN 300 MG/1
300 CAPSULE ORAL NIGHTLY
Qty: 30 CAPSULE | Refills: 0 | Status: SHIPPED | OUTPATIENT
Start: 2022-01-10 | End: 2022-01-20 | Stop reason: SDUPTHER

## 2022-01-10 RX ORDER — HYDROCODONE BITARTRATE AND ACETAMINOPHEN 7.5; 325 MG/1; MG/1
1 TABLET ORAL EVERY 6 HOURS PRN
Qty: 40 TABLET | Refills: 0 | Status: SHIPPED | OUTPATIENT
Start: 2022-01-10 | End: 2022-01-20

## 2022-01-10 RX ADMIN — HYDROCODONE BITARTRATE AND ACETAMINOPHEN 2 TABLET: 7.5; 325 TABLET ORAL at 02:52

## 2022-01-10 RX ADMIN — METOPROLOL SUCCINATE 25 MG: 25 TABLET, EXTENDED RELEASE ORAL at 08:16

## 2022-01-10 RX ADMIN — HYDROCODONE BITARTRATE AND ACETAMINOPHEN 1 TABLET: 7.5; 325 TABLET ORAL at 12:19

## 2022-01-10 RX ADMIN — DIAZEPAM 5 MG: 5 TABLET ORAL at 13:50

## 2022-01-10 RX ADMIN — METHOCARBAMOL TABLETS 750 MG: 750 TABLET, COATED ORAL at 10:34

## 2022-01-10 RX ADMIN — HYDROCODONE BITARTRATE AND ACETAMINOPHEN 2 TABLET: 7.5; 325 TABLET ORAL at 07:47

## 2022-01-10 RX ADMIN — POLYETHYLENE GLYCOL 3350 17 G: 17 POWDER, FOR SOLUTION ORAL at 08:16

## 2022-01-10 RX ADMIN — MONTELUKAST SODIUM 10 MG: 10 TABLET, FILM COATED ORAL at 08:16

## 2022-01-10 RX ADMIN — TAMSULOSIN HYDROCHLORIDE 0.4 MG: 0.4 CAPSULE ORAL at 08:16

## 2022-01-10 RX ADMIN — PRAVASTATIN SODIUM 20 MG: 20 TABLET ORAL at 08:16

## 2022-01-10 RX ADMIN — DOCUSATE SODIUM 200 MG: 100 CAPSULE, LIQUID FILLED ORAL at 08:16

## 2022-01-10 RX ADMIN — DOCUSATE SODIUM 50MG AND SENNOSIDES 8.6MG 2 TABLET: 8.6; 5 TABLET, FILM COATED ORAL at 08:16

## 2022-01-10 RX ADMIN — LOSARTAN POTASSIUM: 50 TABLET, FILM COATED ORAL at 12:19

## 2022-01-10 RX ADMIN — PANTOPRAZOLE SODIUM 40 MG: 40 TABLET, DELAYED RELEASE ORAL at 08:16

## 2022-01-10 RX ADMIN — BISACODYL 10 MG: 10 SUPPOSITORY RECTAL at 08:16

## 2022-01-10 RX ADMIN — METHOCARBAMOL TABLETS 750 MG: 750 TABLET, COATED ORAL at 00:23

## 2022-01-10 NOTE — PLAN OF CARE
Goal Outcome Evaluation:  Plan of Care Reviewed With: patient         VSS. Pt to d/c home with family

## 2022-01-10 NOTE — PLAN OF CARE
Goal Outcome Evaluation:  Plan of Care Reviewed With: patient        Progress: improving  Outcome Summary: POD#3 OF OPEN LUMBAR DECOMPRESSION. DRESSING TO BACK INTACT. VSS. NO IV SITE. AMBULATING FINE. VOIDING PER BRP. BRACE AT BEDSIDE. STILL NO BM. PO PAIN MEDICATION HELPING WITH PAIN. EDUCATION PROVIDED ON BP MONITORING AND URINE OUTPUT. PATIENT VERBALIZED UNDERSTANDING. WILL CONTINUE TO MONITOR.

## 2022-01-10 NOTE — CASE MANAGEMENT/SOCIAL WORK
Discharge Planning Assessment  [facility]     Patient Name: Christiano Barr  MRN: [id number]  Today's Date: 1/10/2022    Admit Date: 1/6/2022     Discharge Needs Assessment    No documentation.                Discharge Plan     Row Name 01/10/22 1040       Plan    Plan Home with family support & [facility].    Provided Post Acute Provider List? Yes    Post Acute Provider List Home Health    Patient/Family in Agreement with Plan yes    Plan Comments Spoke with the patient, verified current information and explained the role of the CCP. Patient said he lives with his wife/Nasima and has family support. He plans to d/c home with family support & PT. CCP Consult for HH per TORRES noted. Discussed with the patient who's agreeable. CMS Compare HH List was reviewed and he requests a referral to [facility] . Referral sent in Epic. Spoke with [facility] who will look into the patient's case and f/u with CCP. No other needs identified at this time. CCP will follow.              Continued Care and Services - Admitted Since 1/6/2022     Home Medical Care     Service Provider Request Status Selected Services Address Phone Fax Patient Preferred    [facility]  Pending - Request Sent N/A [id number], [address], [facility] [address] [phone] --              Expected Discharge Date and Time     Expected Discharge Date Expected Discharge Time    Jan 9, 2022           Trudy Whalen RN

## 2022-01-10 NOTE — DISCHARGE INSTRUCTIONS
Luis Brewer M.D., F.A.C.S  [address] [facility] [address]  [phone]          Lumbar Fusion with Instrumentation - Post-Operative Instructions          • You should have a post-operative visit scheduled with the Physician Assistant or Nurse Practitioner for approximately 2 to 2 ½ weeks post-operatively.  If you do not have one, call the office when you return home to schedule this visit.  You should be off work at least until the first visit.    • Do not lift anything over five (5) pounds.  No bending, twisting, or squatting should be done until the first visit.  However, walking is allowed and encouraged.  Walking should be done on a flat surface.  The speed and distance should be chosen according to your comfort level and stamina.  In general, short frequent walks are preferred.  Climbing stairs is allowed but should be minimized.  In general, activity restrictions will be lessened following the first visit.      • Sitting, while allowed, should be kept to a minimum until the first visit because it may increase your discomfort.  No more than 15 to 20 minutes three times a day should be done, enough time to eat your meals and use the bathroom.  Otherwise, if you are not walking or standing, you should be lying down on your back or side.  A reclining chair is acceptable but only in the reclined position.        • If you are given a back brace in the hospital, this brace is to be worn at all times when you are up or sitting.  It is not necessary to wear the brace while lying in bed.    • A bone stimulator device may be ordered for you.  If one is ordered you will be contacted by the representative about delivering it to your home. If so, follow the bone growth stimulator instructions that you will receive and wear as directed.    • Smoking is not recommended, as it interferes with the fusion process.      • Please do not drive until the first visit, although you may be driven.    • Refrain from any sexual  activity until after your first visit with the Physician Assistant or Nurse Practitioner.      • Take your dressings off and leave them off after the first day home.  You may get the incisions wet during showering and pat them dry, but do not soak the incisions or rub them vigorously with a towel.  No tub baths or hot tubs allowed.  The incision should be cleaned twice daily with hydrogen peroxide unless otherwise directed by the nurse or physician.      • A prescription for pain medications will be provided at discharge.  This medication is primarily for any pain related to the incisions and should be used only on an as-needed basis.  Sometimes, antibiotics and medication for spasms are also given.  Do not take anti-inflammatory medications such as Ibuprofen, Aspirin, Motrin or Advil.  These medications may interfere with the fusion process of your surgery.  Take medications only as directed by the physician. If a refill of your pain medication is required, please contact your pharmacy for refill approval. Our office will refill you prescription for half the original quantity one time after surgery only when refill is due.     • You have also been prescribed a muscle relaxer to help with post operative muscle pain. You may find that this medication provides more lasting relief. You can incorporate this medication into you regimen by taking four hours after your pain med. You should take the muscle relaxer medication no more than 4 times in a 24 hour period.         • If there are any problems, such as excessive back or leg pain, persistent temperature of 100.5 degrees or greater despite Tylenol, chills, excessive bloody discharge from the wound, redness, swelling or extreme warmth to skin around the incision, clear or yellowish discharge from the wound, severe headaches when sitting or standing, chest pain, shortness of air, difficulty urinating, please call the office.  A small amount of bleeding from the incision  during the first few days is not unusual.      • We look forward to seeing you again in follow-up.  Do no hesitate to call if any questions or concerns arise regarding your surgery.  We would rather you communicate with us regarding such issues early.          Thank You.

## 2022-01-10 NOTE — DISCHARGE SUMMARY
Christiano Barr  1960    Patient Care Team:  Daniel Johnson MD as PCP - General (Family Medicine)    Date of Admit: 1/6/2022    Date of Discharge:  1/10/2022    Discharge Diagnosis:  Acute urinary retention    Spinal stenosis of lumbar region with radiculopathy    Congenital spondylolysis, lumbosacral region    Spinal stenosis of lumbar region with neurogenic claudication      Procedures Performed  Procedure(s):  Open lumbar decompression with posterior lumbar interbody fusion with cages and pedicle scew/susan fixation lumbar four/five, lumbar five/sacral one using the Mazor robot       Complications: None    Consultants:   Consults     Date and Time Order Name Status Description    1/6/2022  4:32 PM Inpatient Hospitalist Consult Completed     12/23/2021  9:57 AM Inpatient Neurosurgery Consult Completed           Condition on Discharge: stable    Discharge disposition: Home with Outpatient HH    Pertinent Test Results: none    Brief HPI: Patient evaluated in office for complaints of severe back and right let pain with weakness some mild weakness in his right quadricep and right tibialis anterior.  Never really had any conservative treatment. Imaging revealed moderate foraminal stenosis on the right at L 5-S1 secondary to extension of disc material into the neuroforamen.  Epidural lipomatosis extending from mid body of L4 to the inferior aspect of the spinal canal significantly narrowing the thecal sac at L4-L5 and L5-S1. RBAs of treatment were discussed including the above procedure. Patient consented to above procedure.    Hospital Course: Patient admitted for above procedure. The procedure itself was without complication. The patient was transferred to [facility] following recovery.  He did well after surgery, he continues to have some numbness in his bilateral lateral portion of his thighs, and bilateral feet.  He is ambulating 800 feet and had completed bilateral lower extremity strengthening exercises  and was to go home with physical therapy with [facility], they do not have enough staff and will possibly go to outpatient therapy once the Covid pandemic slows down again, as he does not want to predispose himself to any unnecessary risks.    Discharge Physical Exam:    Temp:  [96.9 °F (36.1 °C)-98.8 °F (37.1 °C)] 97.4 °F (36.3 °C)  Heart Rate:  [54-70] 70  Resp:  [16-18] 18  BP: (111-128)/(61-78) 119/78    Current labs:  Lab Results (last 24 hours)     ** No results found for the last 24 hours. **            General Appearance No acute distress   HEENT NC/AT;    Neurological Awake, Alert, and oriented x 3   Cranial nerves CN II-XII intact   Motor Strength equal, tone normal, numbness bilateral feet and outer thighs   Sensory Intact to light touch decreased in feet and thighs   Gait and station Ambulating with walker, walker ordered for home use.  PT outpatient ordered.    Neck Supple, trachea midline   Back Incision with steri strips intact, no edema, no erythema, small amount of drainage.    Extremities Moves all extremities well, no edema, no cyanosis, no redness         Discharge Medications  [place] has been reviewed and narcotic consent is on file in the patient's chart.     Your medication list      START taking these medications      Instructions Last Dose Given Next Dose Due   gabapentin 300 MG capsule  Commonly known as: Neurontin      Take 1 capsule by mouth Every Night for 30 days.       tamsulosin 0.4 MG capsule 24 hr capsule  Commonly known as: FLOMAX  Start taking on: January 11, 2022      Take 1 capsule by mouth Daily.          CHANGE how you take these medications      Instructions Last Dose Given Next Dose Due   cyclobenzaprine 10 MG tablet  Commonly known as: FLEXERIL  What changed:   · when to take this  · reasons to take this      Take 1 tablet by mouth 3 (Three) Times a Day As Needed for Muscle Spasms for up to 10 days.       HYDROcodone-acetaminophen 7.5-325 MG per tablet  Commonly known as:  SYD  What changed:   · Another medication with the same name was added. Make sure you understand how and when to take each.  · Another medication with the same name was removed. Continue taking this medication, and follow the directions you see here.      Take 1 tablet by mouth Every 6 (Six) Hours As Needed for Moderate Pain .       HYDROcodone-acetaminophen 7.5-325 MG per tablet  Commonly known as: NORCO  What changed: You were already taking a medication with the same name, and this prescription was added. Make sure you understand how and when to take each.  Replaces: HYDROcodone-acetaminophen 5-325 MG per tablet      Take 1 tablet by mouth Every 6 (Six) Hours As Needed for Moderate Pain  for up to 10 days.          CONTINUE taking these medications      Instructions Last Dose Given Next Dose Due   GLUCOSAMINE 1500 COMPLEX PO      Take  by mouth. Unknown dosage, OTC, takes 2 tabs daily       losartan-hydrochlorothiazide 100-25 MG per tablet  Commonly known as: HYZAAR      Take 1 tablet by mouth Daily.       metoprolol succinate XL 25 MG 24 hr tablet  Commonly known as: TOPROL-XL      Take 1 tablet by mouth Daily.       montelukast 10 MG tablet  Commonly known as: SINGULAIR      Take 10 mg by mouth Daily.       ondansetron 8 MG tablet  Commonly known as: ZOFRAN      Take 8 mg by mouth Every 8 (Eight) Hours As Needed.       pantoprazole 40 MG EC tablet  Commonly known as: PROTONIX      Take 40 mg by mouth Daily.       pravastatin 20 MG tablet  Commonly known as: PRAVACHOL      Take 20 mg by mouth Daily.       sennosides-docusate 8.6-50 MG per tablet  Commonly known as: PERICOLACE      Take 2 tablets by mouth 2 (Two) Times a Day.          STOP taking these medications    dexamethasone 1 MG tablet  Commonly known as: DECADRON        HYDROcodone-acetaminophen 5-325 MG per tablet  Commonly known as: NORCO  Replaced by: HYDROcodone-acetaminophen 7.5-325 MG per tablet  You also have another medication with the same name  that you need to continue taking as instructed.        traMADol-acetaminophen 37.5-325 MG per tablet  Commonly known as: ULTRACET              Where to Get Your Medications      [facility] [place] [address] [facility] [facility] [address]    Hours: 7:00 AM-6:00 PM Mon-Fri, 8:00 AM-4:30 PM Sat-Sun (Closed 12-12:30PM) Phone: [phone]   · cyclobenzaprine 10 MG tablet  · gabapentin 300 MG capsule  · HYDROcodone-acetaminophen 7.5-325 MG per tablet  · tamsulosin 0.4 MG capsule 24 hr capsule         Discharge Diet:   Diet Instructions     Diet:      Diet Texture / Consistency: Regular    Fluid Consistency: Thin        Diet Order   Procedures   • Diet Regular       Activity at Discharge:   Activity Instructions     Discharge Activity      1) No driving until cleared by Neurosurgery and no longer taking narcotics.   2) Off school / work until cleared by Neurosurgery.  3) May shower tomorrow.  Do not submerge incision in pool, or tub.  4) Do not lift / push / pull more then 5 lbs.  No overhead lifting.   No impact or exertional activities.  Wear LSO brace as directed Dr. Brewer.  May have off for showers.   See Dr. Brewer's specific instructions for further information.    Other Restrictions (Specify)      Pelvic Rest            Call for: questions or concerns    Follow-up Appointments  Future Appointments   Date Time Provider Department Center   1/20/2022  2:45 PM Nguyen Ruiz APRN [facility]      Follow-up Information     Daniel Johnson MD .    Specialty: Family Medicine  Why: re:  flomax and urine retention  Contact information:  [address] [place] [address] [phone]                       Additional Instructions for the Follow-ups that You Need to Schedule     Discharge Follow-up with PCP   As directed       Currently Documented PCP:    Daniel Johnson MD    PCP Phone Number:    [phone]     Follow Up Details: re:  flomax and urine  "retention         Dressing Change Instructions   As directed      Dressing change: daily, as needed, if soiled.  Can keep dressing off, once incision is no longer draining.    Order Comments: Dressing change: daily, as needed, if soiled.  Can keep dressing off, once incision is no longer draining.          Notify Physician or Go To The ED For the Following Conditions   As directed      Temperature 100.5 or greater, wound swelling, drainage, or redness that is increasing; new or uncontrolled pain, new weakness, bowel or bladder changes.  Sudden or severe changes in voice or breathing, swallow, or neck swelling.    Order Comments: Temperature 100.5 or greater, wound swelling, drainage, or redness that is increasing; new or uncontrolled pain, new weakness, bowel or bladder changes.  Sudden or severe changes in voice or breathing, swallow, or neck swelling.            Walker ordered at d/c.    Will need outpatient PT, once d/c'd and will decide in the future if he will follow up, once the pandemic has  down once again.  He has ordered himself a hospital bed for home use.  He had requested gabapentin for the numbness in his bilateral lower extremities and feet, I have given her a 1 month supply.    Test Results Pending at Discharge     None    I discussed the discharge instructions with patient, nursing staff and Dr. Osman Mascorro, APRN  01/10/22  16:35 EST    \"Dictated utilizing Dragon dictation\".      "

## 2022-01-10 NOTE — PROGRESS NOTES
Name: Christiano Barr ADMIT: 2022   : 1960  PCP: Daniel Johnson MD    MRN: [id number] LOS: 4 days   AGE/SEX: 61 y.o. male  ROOM: [facility]     Subjective   Subjective   No new complaints or events overnight.  He is more than ready to go home.    Review of Systems   Constitutional: Negative for chills and fever.   Respiratory: Negative for cough and shortness of breath.    Cardiovascular: Negative for chest pain and leg swelling.   Gastrointestinal: Negative for abdominal pain, constipation, diarrhea, nausea and vomiting.   Genitourinary: Positive for urgency. Negative for dysuria and flank pain.   Musculoskeletal: Positive for gait problem.   Neurological: Positive for weakness.   Psychiatric/Behavioral: Negative for confusion.        Objective   Objective   Vital Signs  Temp:  [96.9 °F (36.1 °C)-98.8 °F (37.1 °C)] 97.4 °F (36.3 °C)  Heart Rate:  [54-70] 70  Resp:  [16-18] 18  BP: (111-128)/(61-78) 119/78  SpO2:  [94 %-96 %] 94 %  on   ;   Device (Oxygen Therapy): room air  Body mass index is 33.25 kg/m².     Physical Exam  Constitutional:       General: He is not in acute distress.     Appearance: He is obese. He is not toxic-appearing.   HENT:      Head: Normocephalic and atraumatic.   Cardiovascular:      Rate and Rhythm: Normal rate.      Heart sounds: Normal heart sounds.   Pulmonary:      Effort: Pulmonary effort is normal.      Breath sounds: Normal breath sounds.   Abdominal:      General: There is distension.      Palpations: Abdomen is soft.      Tenderness: There is no abdominal tenderness. There is no guarding.      Comments: hypoactive   Musculoskeletal:         General: Tenderness (RLE) present.      Cervical back: Normal range of motion and neck supple.      Right lower leg: No edema.      Left lower leg: No edema.   Skin:     General: Skin is warm and dry.   Neurological:      Mental Status: He is alert and oriented to person, place, and time.      Cranial Nerves: No cranial  nerve deficit (generalized).   Psychiatric:         Behavior: Behavior normal.         Thought Content: Thought content normal.         Results Review     I reviewed the patient's new clinical results.  Results from last 7 days   Lab Units 01/08/22  0513 01/07/22  0631   WBC 10*3/mm3 10.53 13.57*   HEMOGLOBIN g/dL 11.2* 12.4*   PLATELETS 10*3/mm3 108* 137*     Results from last 7 days   Lab Units 01/08/22  0513 01/07/22  0631   SODIUM mmol/L 131* 134*   POTASSIUM mmol/L 3.9 3.8   CHLORIDE mmol/L 99 102   CO2 mmol/L 23.8 22.3   BUN mg/dL 19 23   CREATININE mg/dL 0.70* 0.95   GLUCOSE mg/dL 97 148*   EGFR IF NONAFRICN AM mL/min/1.73 115 81       Results from last 7 days   Lab Units 01/08/22  0513 01/07/22  0631   CALCIUM mg/dL 7.7* 7.9*       COVID19   Date Value Ref Range Status   01/05/2022 Not Detected Not Detected - Ref. Range Final   12/22/2021 Not Detected Not Detected - Ref. Range Final     No results found for: HGBA1C, POCGLU    XR Spine Lumbar 2 or 3 View  Narrative: LUMBAR SPINE OPERATIVE X-RAYS     HISTORY: Back pain.     TECHNIQUE: 56 seconds of fluoroscopy was provided operatively for Dr. Brewer. Five fluoroscopic images were saved.     FINDINGS: Images provided show level localization and subsequent  application of pedicle screws at L4, L5, S1, and intervertebral cages at  the L4-5 and L5-S1 levels.     Impression: Intraoperative imaging was provided for Dr. Brewer  during lumbar spine surgery.     This report was finalized on 1/6/2022 2:34 PM by SILVIO Rodrigues Arm During Surgery  This procedure was auto-finalized with no dictation required.    Scheduled Medications  bisacodyl, 10 mg, Rectal, Daily  docusate sodium, 200 mg, Oral, BID  losartan-HCTZ (HYZAAR) 100-25 combo dose, , Oral, Daily  metoprolol succinate XL, 25 mg, Oral, Q24H  montelukast, 10 mg, Oral, Daily  pantoprazole, 40 mg, Oral, Daily  polyethylene glycol, 17 g, Oral, Daily  pravastatin, 20 mg, Oral,  Daily  senna-docusate sodium, 2 tablet, Oral, BID  sodium chloride, 10 mL, Intravenous, Q12H  tamsulosin, 0.4 mg, Oral, Daily    Infusions   Diet  Diet Regular       Assessment/Plan     Active Hospital Problems    Diagnosis  POA   • **Acute urinary retention [R33.8]  Unknown   • Spinal stenosis of lumbar region with neurogenic claudication [M48.062]  Yes   • Congenital spondylolysis, lumbosacral region [Q76.2]  Unknown   • Spinal stenosis of lumbar region with radiculopathy [M48.061, M54.16]  Unknown      Resolved Hospital Problems   No resolved problems to display.       61 y.o. male admitted with Acute urinary retention.    Complaints of urinary urgency & frequency following surgery-resolved.  Continue Flomax 0.4 mg PO daily.  He will need to follow-up with his PCP.    BP acceptable on ARB/HCTZ.     Glucose acceptable, consistent.      WBC count normalized.      Tolerating CPAP at night.      I-S encouraged.    · SCD for DVT prophylaxis.  · CPR  · Discussed with Dr. Aleman.  · Thank you for allowing A to be a part of this patient's care.  He is stable for discharge home today from internal medicine standpoint.      HERO Boone  [facility]  01/10/22  15:37 EST

## 2022-01-10 NOTE — DISCHARGE PLACEMENT REQUEST
"Juliet Mattson (61 y.o. Male)             Date of Birth Social Security Number Address Home Phone MRN    1960  [id number]   [facility] [address] [phone] [id number]    Religious Marital Status             Yazidism        Admission Date Admission Type Admitting Provider Attending Provider Department, Room/Bed    1/6/22 Elective Luis Brewer MD Villanueva, Wayne, MD [address], [address]    Discharge Date Discharge Disposition Discharge Destination                         Attending Provider: Luis Brewer MD    Allergies: No Known Allergies    Isolation: None   Infection: None   Code Status: CPR   Advance Care Planning Activity    Ht: 170.2 cm (67\")   Wt: 96.3 kg (212 lb 4.9 oz)    Admission Cmt: None   Principal Problem: Acute urinary retention [R33.8]                 Active Insurance as of 1/6/2022     Primary Coverage     Payor Plan Insurance Group Employer/Plan Group    [facility] BY DYLAN [facility] BY DYLAN [id number]     Payor Plan Address Payor Plan Phone Number Payor Plan Fax Number Effective Dates    [address]   1/1/2021 - None Entered    [facility] [address]       Subscriber Name Subscriber Birth Date Member ID       JULIET MATTSON 1960 [id number]                 Emergency Contacts      (Rel.) Home Phone Work Phone Mobile Phone    Nasima Mattson (Spouse) -- -- [phone]              "

## 2022-01-10 NOTE — CASE MANAGEMENT/SOCIAL WORK
Continued Stay Note  [facility]     Patient Name: Christiano Barr  MRN: [id number]  Today's Date: 1/10/2022    Admit Date: 1/6/2022     Discharge Plan     Row Name 01/10/22 7712       Plan    Plan Home with family support & OP PT.    Patient/Family in Agreement with Plan yes    Plan Comments Spoke with [facility] who is unable to accept the patient at this time due to staffing. [facility] is the only  agency that serves the patient's area of [place]. Updated the patient who's agreeable and plans to go to Outpatient Physical Therapy. Patient said he will schedule his OP PT appointments and arrange transportation to/from those appointments independently. No other needs identified.               Discharge Codes    No documentation.               Expected Discharge Date and Time     Expected Discharge Date Expected Discharge Time    Maciej 10, 2022             Trudy Whalen RN

## 2022-01-10 NOTE — TELEPHONE ENCOUNTER
Wanting to know if the APRN have been around to discharge.    Explain to ask charge nurse to see if they have come around.  Patient has not seen anyone

## 2022-01-11 NOTE — CASE MANAGEMENT/SOCIAL WORK
Case Management Discharge Note      Final Note: Home with OP PT.    Provided Post Acute Provider List?: Yes  Post Acute Provider List: Home Health    Selected Continued Care - Discharged on 1/10/2022 Admission date: 1/6/2022 - Discharge disposition: Home or Self Care    Destination    No services have been selected for the patient.              Durable Medical Equipment    No services have been selected for the patient.              Dialysis/Infusion    No services have been selected for the patient.              Home Medical Care    No services have been selected for the patient.              Therapy    No services have been selected for the patient.              Community Resources    No services have been selected for the patient.              Community & DME    No services have been selected for the patient.                       Final Discharge Disposition Code: 01 - home or self-care

## 2022-01-11 NOTE — OUTREACH NOTE
Prep Survey      Responses   Yarsanism facility patient discharged from? [place]   Is LACE score < 7 ? No   Emergency Room discharge w/ pulse ox? No   Eligibility Readm Mgmt   Discharge diagnosis Lumbar  compression with interbody fusion   Does the patient have one of the following disease processes/diagnoses(primary or secondary)? General Surgery   Does the patient have Home health ordered? Yes   What [facility] the [facility] health agency?  [facility]   Is there a DME ordered? No   Prep survey completed? Yes          Georgette Holbrook RN

## 2022-01-11 NOTE — PAYOR COMM NOTE
"DISCHARGED  REF #[id number]      Juliet Mattson (61 y.o. Male)             Date of Birth Social Security Number Address Home Phone MRN    1960  [id number]   [facility] [address] [phone] [id number]    Faith Marital Status             Evangelical        Admission Date Admission Type Admitting Provider Attending Provider Department, Room/Bed    1/6/22 Elective Luis Read MD  [address], [address]1    Discharge Date Discharge Disposition Discharge Destination          1/10/2022 Home or Self Care              Attending Provider: (none)   Allergies: No Known Allergies    Isolation: None   Infection: None   Code Status: Prior   Advance Care Planning Activity    Ht: 170.2 cm (67\")   Wt: 96.3 kg (212 lb 4.9 oz)    Admission Cmt: None   Principal Problem: Acute urinary retention [R33.8]                 Active Insurance as of 1/6/2022     Primary Coverage     Payor Plan Insurance Group Employer/Plan Group    [facility] BY DYLAN [facility] BY DYLAN [id number]     Payor Plan Address Payor Plan Phone Number Payor Plan Fax Number Effective Dates    [address]   1/1/2021 - None Entered    [facility] [address]       Subscriber Name Subscriber Birth Date Member ID       JULIET MATTSON 1960 [id number]                 Emergency Contacts      (Rel.) Home Phone Work Phone Mobile Phone    Nasima Mattson (Spouse) -- -- [phone]            Discharge Order (From admission, onward)     Start     Ordered    01/10/22 1625  Discharge patient  Once        Expected Discharge Date: 01/10/22    Expected Discharge Time: Afternoon    Discharge Disposition: Home or Self Care    Physician of Record for Attribution - Please select from Treatment Team: LUIS READ [[id number]]    Review needed by CMO to determine Physician of Record: No       Question Answer Comment   Physician of Record for Attribution - Please select from Treatment Team " RILEY READ    Review needed by CMO to determine Physician of Record No        01/10/22 2747

## 2022-01-13 ENCOUNTER — READMISSION MANAGEMENT (OUTPATIENT)
Dept: CALL CENTER | Facility: HOSPITAL | Age: 62
End: 2022-01-13

## 2022-01-13 NOTE — OUTREACH NOTE
General Surgery Week 1 Survey      Responses   [facility] patient discharged from? [place]   Does the patient have one of the following disease processes/diagnoses(primary or secondary)? General Surgery   Week 1 attempt successful? Yes   Call start time 1537   Call end time 1546   Meds reviewed with patient/caregiver? Yes   Is the patient having any side effects they believe may be caused by any medication additions or changes? No   Does the patient have all medications related to this admission filled (includes all antibiotics, pain medications, etc.) Yes   Is the patient taking all medications as directed (includes completed medication regime)? Yes   Does the patient have a follow up appointment scheduled with their surgeon? Yes   Comments will be keeping appointment as schedule   Has home health visited the patient within 72 hours of discharge? No   Home health comments home health could not come due to staffing from  notes   What DME was ordered? walker/ has  a hospital bed   Has all DME been delivered? Yes   Comments not having PT   Did the patient receive a copy of their discharge instructions? Yes   Nursing interventions Reviewed instructions with patient   What is the patient's perception of their health status since discharge? Improving  [having numbness and burning which is related to swelling]   Is the patient /caregiver able to teach back basic post-op care? Take showers only when approved by MD-sponge bathe until then,  No tub bath, swimming, or hot tub until instructed by MD,  Keep incision areas clean,dry and protected,  Lifting as instructed by MD in discharge instructions,  Drive as instructed by MD in discharge instructions  [reviewed with the patient]   Is the patient/caregiver able to teach back signs and symptoms of incisional infection? Increased redness, swelling or pain at the incisonal site,  Incisional warmth,  Fever,  Increased drainage or bleeding,  Pus or odor from incision   [discussed with the patient]   Is the patient/caregiver able to teach back steps to recovery at home? Rest and rebuild strength, gradually increase activity,  Set small, achievable goals for return to baseline health,  Eat a well-balance diet  [discussed]   If the patient is a current smoker, are they able to teach back resources for cessation? Not a smoker   Is the patient/caregiver able to teach back the hierarchy of who to call/visit for symptoms/problems? PCP, Specialist, Home health nurse, Urgent Care, ED, 911 Yes   Week 1 call completed? Yes   Wrap up additional comments has numbness and burning  in feet at times, stated may be due to swelling per doctor          Terese Sparrow RN

## 2022-01-17 ENCOUNTER — TELEPHONE (OUTPATIENT)
Dept: NEUROSURGERY | Facility: CLINIC | Age: 62
End: 2022-01-17

## 2022-01-17 NOTE — TELEPHONE ENCOUNTER
Pt called today to say that his is down to 2 hydrocodone tablets. He states that he has been hallucinating felling like something is crawling on his feet and like someone is touching his shoulder even when nobody is there while taking them. He wants to try to ween off of them. He states that he hasn't taken them since last night. He is in pain but the only thing he is allowed to take according to his post op instructions is tylenol. He hasn't tried that yet. Please call pt.

## 2022-01-18 DIAGNOSIS — Z98.1 STATUS POST LUMBAR SPINAL FUSION: Primary | ICD-10-CM

## 2022-01-18 RX ORDER — TRAMADOL HYDROCHLORIDE 50 MG/1
50 TABLET ORAL EVERY 8 HOURS PRN
Qty: 40 TABLET | Refills: 0 | Status: SHIPPED | OUTPATIENT
Start: 2022-01-18

## 2022-01-18 NOTE — PROGRESS NOTES
HPI:   Christiano Barr is a 61 y.o. male for follow-up of Spinal stenosis of lumbar region with neurogenic claudication and Congenital spondylolysis, lumbosacral region. He is status post Open lumbar decompression with posterior lumbar interbody fusion with cages and pedicle scew/susan fixation lumbar four/five, lumbar five/sacral one using the Mazor robot on January 6, 2021 with Dr. Brewer.    He presents unaccompanied or accompanied by his spouse.     Today Mr. Barr reports bilateral feet numbness, bilateral leg numbness.  He states he feels sharp pain in his right and left low back/hip area that radiates down his thighs laterally, The pain picks up again medially in his shins and continues to the end of his toes.    This is his first postoperative visit.  Its been about 2 weeks since he underwent a two-level lumbar decompression and fusion at L4 and L5 and L5-S1.  He had an isthmic L5-S1 spondylolisthesis but also superimposed right L4-5 herniated disc.  The severe sciatica on the right is better but he still has some numbness and tingling in his legs which I told him is not uncommon.  It was present even before the surgery but he seems to notice it more now that some of the sharp sciatic pain is better.  The incision is healing well and the drain stitch and Steri-Strips were removed.  He really does not have much incisional pain at this point so I told him he can hold off on using his Flexeril and even the pain medications although the pain medications help him sleep at night.  The numbness and tingling on the right residual neuropathic pain bother him at night so we will change his gabapentin which is now just 300 nightly to 600/300 and try a Medrol Dosepak which is helped matters along.  I do not think he is ready to drive but perhaps by the next visit in 4 weeks he can.  We will start some therapy and get some x-rays before the next visit.  Overall I encouraged him that a lot of these residual  "symptoms are not unexpected.    Review of Systems   Constitutional: Negative for fever.   All other systems reviewed and are negative.       /84   Pulse 73   Temp 97.8 °F (36.6 °C)   Ht 170.2 cm (67\")   Wt 96.8 kg (213 lb 6.4 oz)   SpO2 99%   BMI 33.42 kg/m²     Physical Exam  Constitutional:       Appearance: He is well-developed.   HENT:      Head: Normocephalic and atraumatic.   Eyes:      Extraocular Movements: EOM normal.      Conjunctiva/sclera: Conjunctivae normal.      Pupils: Pupils are equal, round, and reactive to light.   Neck:      Vascular: No carotid bruit.   Neurological:      Mental Status: He is oriented to person, place, and time.      Coordination: Finger-Nose-Finger Test and Heel to Shin Test normal.      Gait: Gait is intact.      Deep Tendon Reflexes:      Reflex Scores:       Tricep reflexes are 2+ on the right side and 2+ on the left side.       Bicep reflexes are 2+ on the right side and 2+ on the left side.       Brachioradialis reflexes are 2+ on the right side and 2+ on the left side.       Patellar reflexes are 2+ on the right side and 2+ on the left side.       Achilles reflexes are 2+ on the right side and 2+ on the left side.  Psychiatric:         Speech: Speech normal.       Neurologic Exam     Mental Status   Oriented to person, place, and time.   Registration of memory: Good recent and remote memory.   Attention: normal. Concentration: normal.   Speech: speech is normal   Level of consciousness: alert  Knowledge: consistent with education.     Cranial Nerves     CN II   Visual fields full to confrontation.   Visual acuity: normal    CN III, IV, VI   Pupils are equal, round, and reactive to light.  Extraocular motions are normal.     CN V   Facial sensation intact.   Right corneal reflex: normal  Left corneal reflex: normal    CN VII   Facial expression full, symmetric.   Right facial weakness: none  Left facial weakness: none    CN VIII   Hearing: intact    CN IX, X "   Palate: symmetric    CN XI   Right sternocleidomastoid strength: normal  Left sternocleidomastoid strength: normal    CN XII   Tongue: not atrophic  Tongue deviation: none    Motor Exam   Muscle bulk: normal  Right arm tone: normal  Left arm tone: normal  Right leg tone: normal  Left leg tone: normal    Strength   Strength 5/5 except as noted.     Sensory Exam   Light touch normal.     Gait, Coordination, and Reflexes     Gait  Gait: normal    Coordination   Finger to nose coordination: normal  Heel to shin coordination: normal    Reflexes   Right brachioradialis: 2+  Left brachioradialis: 2+  Right biceps: 2+  Left biceps: 2+  Right triceps: 2+  Left triceps: 2+  Right patellar: 2+  Left patellar: 2+  Right achilles: 2+  Left achilles: 2+  Right : 2+  Left : 2+      Findings/Results:    Assessment/Plan:  Diagnoses and all orders for this visit:    1. Postoperative visit (Primary)  -     Ambulatory Referral to Physical Therapy Evaluate and treat  -     XR Spine Lumbar Complete With Flex & Ext; Future  -     HYDROcodone-acetaminophen (NORCO) 7.5-325 MG per tablet; Take 1 tablet by mouth 2 (Two) Times a Day As Needed for Moderate Pain  for up to 10 days.  Dispense: 20 tablet; Refill: 0  -     gabapentin (Neurontin) 300 MG capsule; Take 1 capsule in the morning and 2 capsules in the evening  Dispense: 90 capsule; Refill: 3    Other orders  -     methylPREDNISolone (MEDROL) 4 MG dose pack; Take as directed on package instructions.  Dispense: 21 tablet; Refill: 0        Discussion/Summary    Again, he was reassured that the slowness of the numbness and tingling even neuropathic pain as expected.  We will increase his gabapentin to 300/600, renew his pain medications and give him a steroid pack.  He cannot drive but perhaps he can by the next visit.  We will start some physical therapy and get x-rays before the next visit.    Plan: Return in about 4 weeks (around 2/17/2022) for Face-to-face.         Patient Care  "Team    Patient Care Team:  Daniel Johnson MD as PCP - General (Family Medicine)    Luis Brewer MD  1/20/2022    \"Dictated utilizing Dragon dictation\".    "

## 2022-01-19 ENCOUNTER — TELEPHONE (OUTPATIENT)
Dept: NEUROSURGERY | Facility: CLINIC | Age: 62
End: 2022-01-19

## 2022-01-19 NOTE — TELEPHONE ENCOUNTER
I spoke with Dr. MACKEY and he states to bring patient in tomorrow to see him at 12:30 so he can physically examine him.  Patient was switched from Dorothy to Dr. MACKEY.  I told patient that Dr. MACKEY would be in surgery that morning so there maybe a little bit of a delay for him to be over here.  Patient understood and said that he would be at the office at 12:30 tomorrow to see Dr. MACKEY

## 2022-01-19 NOTE — TELEPHONE ENCOUNTER
Patient called in stating that 2 days ago both of his feet started having numbness and tingling in them.  He stated that his right one is worse than his left.  He stated that its feel like his feet are swollen but they arent physically swollen.  He denies any discoloration in either extremity or any pain or hardness in his calf.  I told patient I would speak with Dr. MACKEY and call him right back.

## 2022-01-20 ENCOUNTER — OFFICE VISIT (OUTPATIENT)
Dept: NEUROSURGERY | Facility: CLINIC | Age: 62
End: 2022-01-20

## 2022-01-20 VITALS
SYSTOLIC BLOOD PRESSURE: 126 MMHG | HEART RATE: 73 BPM | OXYGEN SATURATION: 99 % | BODY MASS INDEX: 33.49 KG/M2 | WEIGHT: 213.4 LBS | TEMPERATURE: 97.8 F | DIASTOLIC BLOOD PRESSURE: 84 MMHG | HEIGHT: 67 IN

## 2022-01-20 DIAGNOSIS — Z48.89 POSTOPERATIVE VISIT: Primary | ICD-10-CM

## 2022-01-20 PROCEDURE — 99024 POSTOP FOLLOW-UP VISIT: CPT | Performed by: NEUROLOGICAL SURGERY

## 2022-01-20 RX ORDER — HYDROCODONE BITARTRATE AND ACETAMINOPHEN 7.5; 325 MG/1; MG/1
1 TABLET ORAL 2 TIMES DAILY PRN
Qty: 20 TABLET | Refills: 0 | Status: SHIPPED | OUTPATIENT
Start: 2022-01-20 | End: 2022-01-30

## 2022-01-20 RX ORDER — METHYLPREDNISOLONE 4 MG/1
TABLET ORAL
Qty: 21 TABLET | Refills: 0 | Status: SHIPPED | OUTPATIENT
Start: 2022-01-20 | End: 2022-02-08

## 2022-01-20 RX ORDER — GABAPENTIN 300 MG/1
CAPSULE ORAL
Qty: 90 CAPSULE | Refills: 3 | Status: SHIPPED | OUTPATIENT
Start: 2022-01-20 | End: 2022-01-26 | Stop reason: SDUPTHER

## 2022-01-21 ENCOUNTER — READMISSION MANAGEMENT (OUTPATIENT)
Dept: CALL CENTER | Facility: HOSPITAL | Age: 62
End: 2022-01-21

## 2022-01-21 NOTE — OUTREACH NOTE
General Surgery Week 2 Survey      Responses   [facility] patient discharged from? [place]   Does the patient have one of the following disease processes/diagnoses(primary or secondary)? General Surgery   Week 2 attempt successful? No   Unsuccessful attempts Attempt 1          Naila Saldana RN

## 2022-01-24 ENCOUNTER — TELEPHONE (OUTPATIENT)
Dept: NEUROSURGERY | Facility: CLINIC | Age: 62
End: 2022-01-24

## 2022-01-24 NOTE — TELEPHONE ENCOUNTER
Left message for Mr. Barr regarding the PA. I explained that I have not received a PA from his pharmacy and he was welcome to call me.  
Patient is calling about PA needed for Gabapentin and Hydrocodone. He states Pharmacy faxed a request. He is out of Hydrocodone and only has a few days of Gabapentin left.   
Applied

## 2022-01-26 DIAGNOSIS — Z48.89 POSTOPERATIVE VISIT: ICD-10-CM

## 2022-01-26 RX ORDER — GABAPENTIN 300 MG/1
CAPSULE ORAL
Qty: 90 CAPSULE | Refills: 3 | Status: SHIPPED | OUTPATIENT
Start: 2022-01-26 | End: 2022-02-08

## 2022-01-26 NOTE — TELEPHONE ENCOUNTER
Patient called and is needing a refill on his gabapentin. Confirmed that the patient is taking gabapentin 1 capsule in the morning and 2 in the evening. The patient stated he will be taking his last one tonight. The patient would like it called into [facility] in [place]. Please advise.

## 2022-01-26 NOTE — TELEPHONE ENCOUNTER
Rx Refill Note  Requested Prescriptions     Pending Prescriptions Disp Refills   • gabapentin (Neurontin) 300 MG capsule 90 capsule 3     Sig: Take 1 capsule in the morning and 2 capsules in the evening      Last office visit with prescribing clinician: 1/20/2022      Next office visit with prescribing clinician: 2/18/2022            Josie Gaytan MA  01/26/22, 14:15 EST

## 2022-01-28 ENCOUNTER — TELEPHONE (OUTPATIENT)
Dept: NEUROSURGERY | Facility: CLINIC | Age: 62
End: 2022-01-28

## 2022-01-28 NOTE — TELEPHONE ENCOUNTER
I spoke to Mr. [facility]ivel [facility].   I spoke to Dorothy at [facility] and she put his Gabapentin on a discount card which he will have to pay $24.25 for his Gabapentin.  Dorothy said she has tried every way to run this Rx through his insurance and they simply will not budge.  Patient is aware and I suggested he call his insurance to figure out why.  He verbalized understanding.

## 2022-02-02 ENCOUNTER — TRANSCRIBE ORDERS (OUTPATIENT)
Dept: ADMINISTRATIVE | Facility: HOSPITAL | Age: 62
End: 2022-02-02

## 2022-02-02 DIAGNOSIS — M54.16 LUMBAR RADICULOPATHY: Primary | ICD-10-CM

## 2022-02-03 ENCOUNTER — TELEPHONE (OUTPATIENT)
Dept: NEUROSURGERY | Facility: CLINIC | Age: 62
End: 2022-02-03

## 2022-02-03 NOTE — TELEPHONE ENCOUNTER
Patient called and states that since wearing the brace back and legs have gotten worse.  Spoke w the brace people and they said not to wear the brace for a couple of days    Patient states that the medications are not working and need something that can help.    Thank you    Please advise

## 2022-02-03 NOTE — TELEPHONE ENCOUNTER
PATIENT CALLED BACK ABOUT HIS PAIN.  SPOKE WITH [facility] AND THEY SUGGESTED(w DR. MICHELLE) TELL PATIENT NOT TO WEAR BRACE AND TO STAY ON MEDICATIONS.    PATIENT STATES THAT HE WILL RUN OUT OF HYDROCODONE TOMORROW.    PLEASE CALL HIM .

## 2022-02-07 ENCOUNTER — TELEPHONE (OUTPATIENT)
Dept: NEUROSURGERY | Facility: CLINIC | Age: 62
End: 2022-02-07

## 2022-02-07 NOTE — TELEPHONE ENCOUNTER
Pt called this morning c/o severe n/t and pain in both of his feet. He says pain is a 7 of 8 and reports that his rt foot greater than lt foot. He has been alternating ice and heat. He has even used his wife's heated pedicure tub with hot water. He has used a TENS unit until the pads were worn out. He also hast tried heat and ice on his back. He states that his has rubbed his feet for hours on end.    He is taking Gabapentin 300 mg 1 am and 2 before bed. Last night he found some tramadol and hydrocodone in his medicine cabinet from a surgery 4 years ago and is taking them both at the same time and was given enough relief that he was able to sleep a little over 3 hours last night.

## 2022-02-08 ENCOUNTER — APPOINTMENT (OUTPATIENT)
Dept: MRI IMAGING | Facility: HOSPITAL | Age: 62
End: 2022-02-08

## 2022-02-08 ENCOUNTER — TELEPHONE (OUTPATIENT)
Dept: NEUROSURGERY | Facility: CLINIC | Age: 62
End: 2022-02-08

## 2022-02-08 DIAGNOSIS — Z48.89 POSTOPERATIVE VISIT: ICD-10-CM

## 2022-02-08 RX ORDER — METHYLPREDNISOLONE 4 MG/1
TABLET ORAL
Qty: 21 TABLET | Refills: 0 | Status: SHIPPED | OUTPATIENT
Start: 2022-02-08 | End: 2022-03-23

## 2022-02-08 RX ORDER — GABAPENTIN 300 MG/1
CAPSULE ORAL
Qty: 180 CAPSULE | Refills: 3 | Status: SHIPPED | OUTPATIENT
Start: 2022-02-08 | End: 2022-03-09 | Stop reason: SDUPTHER

## 2022-02-08 NOTE — TELEPHONE ENCOUNTER
I spoke to Norman Momo and informed him of his MDP and the increase in his gabapentin.  He verbalized understanding.

## 2022-02-08 NOTE — TELEPHONE ENCOUNTER
Mr Hutchison called and he is complaining of burning and tingling in both feet.  He can't sleep and he states he gets electric shock in both of his feet.  He went to Physical Therapy and they told him to go home and not to come back if his feet were no better.  Best contact phone number [phone]

## 2022-02-12 ENCOUNTER — NURSE TRIAGE (OUTPATIENT)
Dept: CALL CENTER | Facility: HOSPITAL | Age: 62
End: 2022-02-12

## 2022-02-12 NOTE — TELEPHONE ENCOUNTER
Caller had recent back surgery, has pain and burning of feet, 02/08, called the surgeon office was started on Medrol dose fausto and Gabapentin 600 mg tid. Patient stated did received some relief but now symptoms have returned. Will be speaking with the provider on Monday or UC    Reason for Disposition  • [1] MILD-MODERATE post-op pain (e.g., pain scale 1-7) AND [2] not controlled with pain medications    Additional Information  • Negative: Sounds like a life-threatening emergency to the triager  • Negative: Chest pain  • Negative: Difficulty breathing  • Negative: Acting confused (e.g., disoriented, slurred speech) or excessively sleepy  • Negative: Surgical incision symptoms and questions  • Negative: [1] Discomfort (pain, burning or stinging) when passing urine AND [2] male  • Negative: [1] Discomfort (pain, burning or stinging) when passing urine AND [2] female  • Negative: Constipation  • Negative: New or worsening leg (calf, thigh) pain  • Negative: New or worsening leg swelling  • Negative: Dizziness is severe, or persists > 24 hours after surgery  • Negative: Pain, redness, swelling, or pus at IV Site  • Negative: Symptoms arising from use of a urinary catheter (An or Coude)  • Negative: Cast problems or questions  • Negative: Medication question  • Negative: [1] Widespread rash AND [2] bright red, sunburn-like  • Negative: [1] SEVERE headache AND [2] after spinal (epidural) anesthesia  • Negative: [1] Vomiting AND [2] persists > 4 hours  • Negative: [1] Vomiting AND [2] abdomen looks much more swollen than usual  • Negative: [1] Drinking very little AND [2] dehydration suspected (e.g., no urine > 12 hours, very dry mouth, very lightheaded)  • Negative: Patient sounds very sick or weak to the triager  • Negative: Sounds like a serious complication to the triager  • Negative: Fever > 100.4 F (38.0 C)  • Negative: [1] SEVERE post-op pain (e.g., excruciating, pain scale 8-10) AND [2] not controlled with pain  "medications  • Negative: [1] Caller has URGENT question AND [2] triager unable to answer question  • Negative: [1] Headache AND [2] after spinal (epidural) anesthesia AND [3] not severe  • Negative: Fever present > 3 days (72 hours)    Answer Assessment - Initial Assessment Questions  1. SYMPTOM: \"What's the main symptom you're concerned about?\" (e.g., pain, fever, vomiting)  Pain and burning of feet  2. ONSET: \"When did one weeks  start?\"      oneweek  3. SURGERY: \"What surgery was performed?\"      Spinal surgery  4. DATE of SURGERY: \"When was surgery performed?\"       01/06  5. ANESTHESIA: \" What type of anesthesia did you have?\" (e.g., general, spinal, epidural, local)       general  6. PAIN: \"Is there any pain?\" If Yes, ask: \"How bad is it?\"  (Scale 1-10; or mild, moderate, severe)      severe  7. FEVER: \"Do you have a fever?\" If Yes, ask: \"What is your temperature, how was it measured, and when did it start?\"      no  8. VOMITING: \"Is there any vomiting?\" If yes, ask: \"How many times?\"      no  9. BLEEDING: \"Is there any bleeding?\" If Yes, ask: \"How much?\" and \"Where?\"      no  10. OTHER SYMPTOMS: \"Do you have any other symptoms?\" (e.g., drainage from wound, painful urination, constipation)        no    Protocols used: POST-OP SYMPTOMS AND QUESTIONS-ADULT-      "

## 2022-02-17 ENCOUNTER — TELEPHONE (OUTPATIENT)
Dept: NEUROSURGERY | Facility: CLINIC | Age: 62
End: 2022-02-17

## 2022-02-17 NOTE — TELEPHONE ENCOUNTER
Patient called and states that the gabapentin is causing his feet on top to itch .    It is so bad that he can not sleep.    Please advise him.

## 2022-02-17 NOTE — TELEPHONE ENCOUNTER
I spoke with the patient about the dosage, he reports that he has spoken with his pharmacist about alternatives, he states even at the 600 mg TID it is not helping with the pain he is experiencing. I did advise him to back down to 600 mg twice a day and he would like to possible look at alternatives.

## 2022-02-17 NOTE — PROGRESS NOTES
Subjective   Patient ID: Christiano Barr is a 61 y.o. male is here today for follow-up after physical therapy at hospitals in [place].  Pt last seen on 1/20/22 and reported bilateral foot/leg numbness as well as pain in his low back hip area that radiates to thighs. Pt given Hydrocodone 7.5 mg bid # 20  And Gabapentin 300mg 1am 2 pm  At last visit and pt to get xray lumbar done prior to visit.    Today  Patient states that he has N/T in B/L foot, as well as B/L foot pain. Patient also states that he also has low back pain.     Its been 6 weeks since his L5 4 to S1 decompression and fusion.  He is finished out his therapy and overall he is doing well.  The severe right leg pain that he had is much better.  The thing that bothers him the most is some numbness and tingling and burning on the dorsum side of his foot.  That is what we have been trying to treat him with gabapentin.  He was up to 600 mg 3 times daily and it did not seem to do much for that and his primary care physician raised even more without much benefit.  I still think that is mostly due to nerve manipulation at the time of surgery which should run its course.  I did mention to him that the x-ray showed that one of the cages on the left had backed out.  But I do not think he is symptomatic from that.  There is no weakness in his calf or tibialis anterior on that side and he does not have sciatic pain.  I do not think the numbness and tingling of the burning the dorsum of the feet is related frankly so I do not think we need to do anything about it but I did tell him that the cage could migrate even more and so we will follow it and get a CT scan before the next visit in 6 weeks.  His strength is getting better.  So I told him to finish out his therapy.  He can drive short distances.  He is not using any narcotics.  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  I told to take gabapentin at 600 mg 3 times daily for a week  then 300 mg 3 times daily for a week and then switch over to Lyrica 25 mg twice daily.  We will see if that helps any better but I think it is good to be mostly time.  I will see him in 6 weeks after the CT scan.        Foot Pain  This is a new problem. The current episode started 1 to 4 weeks ago. The problem occurs constantly. The problem has been unchanged. Associated symptoms include myalgias, numbness and weakness. Pertinent negatives include no chills, congestion, fever or neck pain. Associated symptoms comments: B/L foot pain, N/T. Nothing aggravates the symptoms. The treatment provided moderate relief.       The following portions of the patient's history were reviewed and updated as appropriate: allergies, current medications, past family history, past medical history, past social history, past surgical history and problem list.    Review of Systems   Constitutional: Negative for chills and fever.   HENT: Negative for congestion.    Genitourinary: Negative for difficulty urinating and dysuria.   Musculoskeletal: Positive for back pain and myalgias. Negative for neck pain and neck stiffness.        B/L foot pain   Neurological: Positive for weakness and numbness.        B/L foot   All other systems reviewed and are negative.      Objective   Physical Exam  Constitutional:       Appearance: He is well-developed.   HENT:      Head: Normocephalic and atraumatic.   Eyes:      Extraocular Movements: EOM normal.      Conjunctiva/sclera: Conjunctivae normal.      Pupils: Pupils are equal, round, and reactive to light.   Neck:      Vascular: No carotid bruit.   Neurological:      Mental Status: He is oriented to person, place, and time.      Coordination: Finger-Nose-Finger Test and Heel to Shin Test normal.      Gait: Gait is intact.      Deep Tendon Reflexes:      Reflex Scores:       Tricep reflexes are 2+ on the right side and 2+ on the left side.       Bicep reflexes are 2+ on the right side and 2+ on the left  side.       Brachioradialis reflexes are 2+ on the right side and 2+ on the left side.       Patellar reflexes are 2+ on the right side and 2+ on the left side.       Achilles reflexes are 2+ on the right side and 2+ on the left side.  Psychiatric:         Speech: Speech normal.       Neurologic Exam     Mental Status   Oriented to person, place, and time.   Registration of memory: Good recent and remote memory.   Attention: normal. Concentration: normal.   Speech: speech is normal   Level of consciousness: alert  Knowledge: consistent with education.     Cranial Nerves     CN II   Visual fields full to confrontation.   Visual acuity: normal    CN III, IV, VI   Pupils are equal, round, and reactive to light.  Extraocular motions are normal.     CN V   Facial sensation intact.   Right corneal reflex: normal  Left corneal reflex: normal    CN VII   Facial expression full, symmetric.   Right facial weakness: none  Left facial weakness: none    CN VIII   Hearing: intact    CN IX, X   Palate: symmetric    CN XI   Right sternocleidomastoid strength: normal  Left sternocleidomastoid strength: normal    CN XII   Tongue: not atrophic  Tongue deviation: none    Motor Exam   Muscle bulk: normal  Right arm tone: normal  Left arm tone: normal  Right leg tone: normal  Left leg tone: normal    Strength   Strength 5/5 except as noted.     Sensory Exam   Light touch normal.     Gait, Coordination, and Reflexes     Gait  Gait: normal    Coordination   Finger to nose coordination: normal  Heel to shin coordination: normal    Reflexes   Right brachioradialis: 2+  Left brachioradialis: 2+  Right biceps: 2+  Left biceps: 2+  Right triceps: 2+  Left triceps: 2+  Right patellar: 2+  Left patellar: 2+  Right achilles: 2+  Left achilles: 2+  Right : 2+  Left : 2+      Assessment/Plan   Independent Review of Radiographic Studies:      Postoperative x-rays done on 2/25/2022 show that the left-sided L5-S1 cage backed out about 15 mm.   Otherwise the hardware is stable.  Agree with the report.      Medical Decision Making:      Again, I do not think the backed out cage is causing any active symptoms but will follow it and get a CT scan before the next visit in 6 weeks.  I told him to go back to using his LSO when he is up and about and to minimize a lot of bending twisting and lifting.  I told him to wean off of his gabapentin as described above and start Lyrica after that 2-week wean at 25 mg twice daily.  I will see him in 6 weeks and he will finish out his therapy.  He can drive short distances.      Diagnoses and all orders for this visit:    1. Postoperative visit (Primary)  -     pregabalin (Lyrica) 25 MG capsule; Take 1 capsule by mouth 2 (Two) Times a Day.  Dispense: 60 capsule; Refill: 3  -     CT Lumbar Spine Without Contrast; Future      Return in about 6 weeks (around 4/8/2022) for Face-to-face.

## 2022-02-18 NOTE — TELEPHONE ENCOUNTER
Pt called again and I told him that Dr MACKEY has not answered his question yet.  If he does not hear back from him today he was instructed to call after hours with the on call doctor.   Pt voiced understanding.

## 2022-02-18 NOTE — TELEPHONE ENCOUNTER
Patient called to see what was going on.  Explained waiting on Dr. MACKEY to answer question.  He states ok

## 2022-02-23 NOTE — TELEPHONE ENCOUNTER
I spoke with Dr. Brewer, he is going to be seen on Friday 2/25/22 and they will discuss Gabapentin dose then.    never used

## 2022-02-25 ENCOUNTER — HOSPITAL ENCOUNTER (OUTPATIENT)
Dept: GENERAL RADIOLOGY | Facility: HOSPITAL | Age: 62
Discharge: HOME | End: 2022-02-25
Admitting: NEUROLOGICAL SURGERY

## 2022-02-25 ENCOUNTER — OFFICE VISIT (OUTPATIENT)
Dept: NEUROSURGERY | Facility: CLINIC | Age: 62
End: 2022-02-25

## 2022-02-25 VITALS
WEIGHT: 213.4 LBS | BODY MASS INDEX: 33.49 KG/M2 | HEART RATE: 97 BPM | DIASTOLIC BLOOD PRESSURE: 80 MMHG | SYSTOLIC BLOOD PRESSURE: 139 MMHG | TEMPERATURE: 98 F | HEIGHT: 67 IN

## 2022-02-25 DIAGNOSIS — Z48.89 POSTOPERATIVE VISIT: Primary | ICD-10-CM

## 2022-02-25 DIAGNOSIS — Z48.89 POSTOPERATIVE VISIT: ICD-10-CM

## 2022-02-25 PROCEDURE — 72114 X-RAY EXAM L-S SPINE BENDING: CPT

## 2022-02-25 PROCEDURE — 99024 POSTOP FOLLOW-UP VISIT: CPT | Performed by: NEUROLOGICAL SURGERY

## 2022-02-25 RX ORDER — PREGABALIN 25 MG/1
25 CAPSULE ORAL 2 TIMES DAILY
Qty: 60 CAPSULE | Refills: 3 | Status: SHIPPED | OUTPATIENT
Start: 2022-02-25

## 2022-02-28 ENCOUNTER — TELEPHONE (OUTPATIENT)
Dept: NEUROSURGERY | Facility: CLINIC | Age: 62
End: 2022-02-28

## 2022-02-28 NOTE — TELEPHONE ENCOUNTER
PATIENT IS CALLING TO SEE IF THERE IS ANY AVAILABLE TIME IN THE DR MACKEY SCHEDULE TO SEE HIM SOONER THAT 05/04.  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  HE ALSO HAS CONCERN WITH THE BRACE HE IS WEARING. IT IS PAINFUL WHEN WEARING. HE WOULD LIKE TO KNOW IF THERE ARE OTHER BRACE OPTIONS. PATIENT IS ALSO HAVING TROUBLE WITH HIS FEET. HE STATES HIS FEET FEELS LIKE THEY ARE IN A BAG WITH BUMBLE BEES CONSTANTLY STICKING HIM.  HE SAID DR MACKEY IS TRYING TO WING HIM FROM GABAPENTIN BUT IT IS HARD WHEN THE PAIN IS SO INTENSE. PLEASE CALL PATIENT AND ADVISE          [phone]

## 2022-02-28 NOTE — TELEPHONE ENCOUNTER
I called patient and explained to him that he needs to hacve CT and finish physical therapy prior to following back up in the office. I told him once the CT is done I will have Dr. MACKEY review and we will go from there. He voiced understanding.    Patient is wanting to know if he has to wear his back brace. He reports that it is causing him significant pain. 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 Please advise.

## 2022-03-02 ENCOUNTER — TELEPHONE (OUTPATIENT)
Dept: NEUROSURGERY | Facility: CLINIC | Age: 62
End: 2022-03-02

## 2022-03-02 NOTE — TELEPHONE ENCOUNTER
Pt's insurance denied coverage on the Lyrica RX.  We tried to get it authorized and they still denied it.  Pt wants to go back on Gabapentin 900/900/1200 instead of weaning off.  Is this OK?  [phone]

## 2022-03-07 ENCOUNTER — TELEPHONE (OUTPATIENT)
Dept: NEUROSURGERY | Facility: CLINIC | Age: 62
End: 2022-03-07

## 2022-03-07 NOTE — TELEPHONE ENCOUNTER
Patient is requesting a refill on gabapentin.  Patient would also like to ask if there is something to take for feet.  Feet are on fire and hurt badly.    Pharmacy verified    Please advise    Thank you

## 2022-03-09 DIAGNOSIS — Z48.89 POSTOPERATIVE VISIT: ICD-10-CM

## 2022-03-09 RX ORDER — GABAPENTIN 300 MG/1
CAPSULE ORAL
Qty: 180 CAPSULE | Refills: 3 | Status: SHIPPED | OUTPATIENT
Start: 2022-03-09 | End: 2022-03-28 | Stop reason: SDUPTHER

## 2022-03-10 ENCOUNTER — TELEPHONE (OUTPATIENT)
Dept: NEUROSURGERY | Facility: CLINIC | Age: 62
End: 2022-03-10

## 2022-03-10 NOTE — TELEPHONE ENCOUNTER
"Patient has CT scheduled on 3/11/22. Per Dr. MACKEY's last office note, \"I did mention to him that the x-ray showed that one of the cages on the left had backed out.  But I do not think he is symptomatic from that.\" As well as, \"Again, I do not think the backed out cage is causing any active symptoms but will follow it and get a CT scan before the next visit in 6 weeks.\"    He is scheduled for a follow-up to discuss CT on 5/4/22. I will send message to Dr. Brewer for him to review CT so that we can call the patient with findings.   "

## 2022-03-10 NOTE — TELEPHONE ENCOUNTER
"POST-OP PATIENT IS CALLING REGARDING A \"SLIPPING CAGE,\" AS HE WOULD LIKE TO KNOW WHAT NEEDS TO BE DONE FOR IT. ALSO STATES HE IS EXPERIENCING A LOT OF NERVE PAIN IN HIS FEET.    ADVISED PER BRYAN THAT CT SCHEDULED FOR TOMORROW WILL NEED TO BE REVIEWED; ALSO SENDING ENCOUNTER PER BRYAN.  "

## 2022-03-11 ENCOUNTER — HOSPITAL ENCOUNTER (OUTPATIENT)
Dept: CT IMAGING | Facility: HOSPITAL | Age: 62
Discharge: HOME | End: 2022-03-11
Admitting: NEUROLOGICAL SURGERY

## 2022-03-11 DIAGNOSIS — Z48.89 POSTOPERATIVE VISIT: ICD-10-CM

## 2022-03-11 PROCEDURE — 72131 CT LUMBAR SPINE W/O DYE: CPT

## 2022-03-14 ENCOUNTER — TELEPHONE (OUTPATIENT)
Dept: NEUROSURGERY | Facility: CLINIC | Age: 62
End: 2022-03-14

## 2022-03-15 NOTE — TELEPHONE ENCOUNTER
Caller: JULIET MCNALLY    Relationship: PATIENT    Best call back number: [phone]    What is the best time to reach you: ANY    What was the call regarding: TEST RESULTS CT OF LUMBAR SPINE 3/11/22    Do you require a callback: YES   PATIENT CALLED YESTERDAY FOR RESULTS. PLEASE CALL HIM BACK ASAP. PATIENT IS WANTING TO KNOW IF HE SHOULD CONTINUE PT OR NOT.   THANK YOU!

## 2022-03-23 ENCOUNTER — OFFICE VISIT (OUTPATIENT)
Dept: NEUROSURGERY | Facility: CLINIC | Age: 62
End: 2022-03-23

## 2022-03-23 VITALS
BODY MASS INDEX: 33.43 KG/M2 | OXYGEN SATURATION: 97 % | DIASTOLIC BLOOD PRESSURE: 82 MMHG | HEART RATE: 44 BPM | SYSTOLIC BLOOD PRESSURE: 148 MMHG | TEMPERATURE: 97.8 F | WEIGHT: 213 LBS | HEIGHT: 67 IN

## 2022-03-23 DIAGNOSIS — Z48.89 POSTOPERATIVE VISIT: ICD-10-CM

## 2022-03-23 DIAGNOSIS — Q76.2 CONGENITAL SPONDYLOLYSIS, LUMBOSACRAL REGION: Primary | ICD-10-CM

## 2022-03-23 DIAGNOSIS — Z97.8 ORTHOPEDIC HARDWARE PRESENT: ICD-10-CM

## 2022-03-23 PROCEDURE — 99024 POSTOP FOLLOW-UP VISIT: CPT | Performed by: NEUROLOGICAL SURGERY

## 2022-03-23 NOTE — PROGRESS NOTES
Subjective   Patient ID: Christiano Barr is a 61 y.o. male is here today for a 6 week  follow-up after a CT lumbar done on 3/11/22.  Pt last seen on 2/25/22 and reported  N/T in B/L foot, as well as B/L foot pain. Patient also states that he also has low back pain.     Today, Mr. Barr reports constant back pain that radiates up into his upper back. He reports lower bilateral leg and feet pain with numbness and tingling.     Its been about 2-1/2 months since his decompression and fusion from L4-S1.  He initially presented with severe right leg pain and while he has some residual pain that is generally better.  He has no left buttock and leg pain.  What he has complained about since his surgery is a lot of burning pain in both feet with numbness and tingling.  He actually has no motor deficits today but during the routine radiographic follow-up we found out that one of his cages at L5-S1 migrated into the spinal canal.  On the left.  I would have expected him to have left-sided sciatica which he does not.  I am not sure if the displaced cage has any role in his bilateral burning feet.  I had attributed that mainly to nerve root irritation during the posterior surgery.  But the cage itself is not in a stable position I am concerned that it could migrate even further into the spinal canal so I think it has to be revised and I described doing this through an anterior approach at L5-S1 only.  We will need to help with Dr. Rosado for the vascular approach and the goal would be to remove both cages and put an anterior cage and screwed into place.  The neural elements will be decompressed by removal of the cage we will actually need to visualize them directly.  The goals risks and benefits are described below.  I did mention the additional risk of vascular injury and difficulty with ejaculation from this approach.  But I see no other way to do this and doing it from a posterior approach I think would be far  more challenging because of the scar tissue that one would have to encounter.    I plan to leave the pedicle screws in place and not do anything posteriorly.    Back Pain  The problem occurs constantly. The problem has been gradually worsening since onset. The pain is present in the lumbar spine. The quality of the pain is described as aching and burning. The pain radiates to the right thigh, right foot, right knee, left knee, left thigh and left foot. The symptoms are aggravated by position. Associated symptoms include numbness and tingling. Pertinent negatives include no bladder incontinence, bowel incontinence, chest pain, fever or weakness.       The following portions of the patient's history were reviewed and updated as appropriate: allergies, current medications, past family history, past medical history, past social history, past surgical history and problem list.    Review of Systems   Constitutional: Negative for fever.   Respiratory: Negative for chest tightness and shortness of breath.    Cardiovascular: Negative for chest pain.   Gastrointestinal: Negative for bowel incontinence.   Genitourinary: Negative for bladder incontinence.   Musculoskeletal: Positive for back pain.   Neurological: Positive for tingling and numbness. Negative for weakness.   All other systems reviewed and are negative.      Objective   Physical Exam  Constitutional:       Appearance: He is well-developed.   HENT:      Head: Normocephalic and atraumatic.   Eyes:      Extraocular Movements: EOM normal.      Conjunctiva/sclera: Conjunctivae normal.      Pupils: Pupils are equal, round, and reactive to light.   Neck:      Vascular: No carotid bruit.   Neurological:      Mental Status: He is oriented to person, place, and time.      Coordination: Finger-Nose-Finger Test and Heel to Shin Test normal.      Gait: Gait is intact.      Deep Tendon Reflexes:      Reflex Scores:       Tricep reflexes are 2+ on the right side and 2+ on the  left side.       Bicep reflexes are 2+ on the right side and 2+ on the left side.       Brachioradialis reflexes are 2+ on the right side and 2+ on the left side.       Patellar reflexes are 2+ on the right side and 2+ on the left side.       Achilles reflexes are 2+ on the right side and 2+ on the left side.  Psychiatric:         Speech: Speech normal.       Neurologic Exam     Mental Status   Oriented to person, place, and time.   Registration of memory: Good recent and remote memory.   Attention: normal. Concentration: normal.   Speech: speech is normal   Level of consciousness: alert  Knowledge: consistent with education.     Cranial Nerves     CN II   Visual fields full to confrontation.   Visual acuity: normal    CN III, IV, VI   Pupils are equal, round, and reactive to light.  Extraocular motions are normal.     CN V   Facial sensation intact.   Right corneal reflex: normal  Left corneal reflex: normal    CN VII   Facial expression full, symmetric.   Right facial weakness: none  Left facial weakness: none    CN VIII   Hearing: intact    CN IX, X   Palate: symmetric    CN XI   Right sternocleidomastoid strength: normal  Left sternocleidomastoid strength: normal    CN XII   Tongue: not atrophic  Tongue deviation: none    Motor Exam   Muscle bulk: normal  Right arm tone: normal  Left arm tone: normal  Right leg tone: normal  Left leg tone: normal    Strength   Strength 5/5 except as noted.     Sensory Exam   Light touch normal.     Gait, Coordination, and Reflexes     Gait  Gait: normal    Coordination   Finger to nose coordination: normal  Heel to shin coordination: normal    Reflexes   Right brachioradialis: 2+  Left brachioradialis: 2+  Right biceps: 2+  Left biceps: 2+  Right triceps: 2+  Left triceps: 2+  Right patellar: 2+  Left patellar: 2+  Right achilles: 2+  Left achilles: 2+  Right : 2+  Left : 2+      Assessment/Plan   Independent Review of Radiographic Studies:      I reviewed the most  recent CT scan done on 3/11/2022 which shows displacement of the left-sided interbody cage at L5-S1 into the spinal canal.  Agree with the report.  The other cages look okay.      Medical Decision Making:      I described and recommended an anterior lumbar interbody fusion at L5-S1 with the help of Dr. Rosado for the approach.  We will remove the bilateral cages at L5-S1 anteriorly and replace it with an anterior interbody cage with screws.  The goal of surgery is to remove the cage and decompress the spinal canal and maintain stability and distraction of the interbody space to decrease his back pain and leg pain improve his quality of life.  I am not absolutely certain that this will help the numbness and tingling in his feet.  Hopefully will have some effect however.  The risks include, but are not limited to, infection, hemorrhage requiring transfusion or reoperation, CSF leak requiring reoperation, incomplete relief of symptoms, vascular injury, sexual dysfunction, stroke, paralysis, coma, and death.  He agrees to proceed.    Diagnoses and all orders for this visit:    1. Congenital spondylolysis, lumbosacral region (Primary)  -     Ambulatory Referral to Vascular Surgery  -     Case Request; Standing  -     Case Request    2. Postoperative visit  -     Ambulatory Referral to Vascular Surgery  -     Case Request; Standing  -     Case Request    3. Orthopedic hardware present  -     Ambulatory Referral to Vascular Surgery  -     Case Request; Standing  -     Case Request      Return for 2 weeks after surgery to see me.

## 2022-03-24 ENCOUNTER — HOSPITAL ENCOUNTER (OUTPATIENT)
Facility: HOSPITAL | Age: 62
Setting detail: SURGERY ADMIT
End: 2022-03-24
Attending: NEUROLOGICAL SURGERY | Admitting: NEUROLOGICAL SURGERY

## 2022-03-24 ENCOUNTER — TELEPHONE (OUTPATIENT)
Dept: NEUROSURGERY | Facility: CLINIC | Age: 62
End: 2022-03-24

## 2022-03-28 DIAGNOSIS — Z48.89 POSTOPERATIVE VISIT: ICD-10-CM

## 2022-03-28 NOTE — TELEPHONE ENCOUNTER
Rx Refill Note  Requested Prescriptions     Pending Prescriptions Disp Refills   • gabapentin (Neurontin) 300 MG capsule 180 capsule 3     Sig: Take 3 capsules in the morning and 3 capsules in the afternoon and 4 capsules in the evening.      Last office visit with prescribing clinician: 3/23/2022      Next office visit with prescribing clinician: Visit date not found            Josie Gaytan MA  03/28/22, 10:11 EDT

## 2022-03-28 NOTE — TELEPHONE ENCOUNTER
Patient called to check status of prescription. I let patient know that once Dr Brewer responds to the message it will get sent to the pharmacy.

## 2022-03-29 RX ORDER — GABAPENTIN 300 MG/1
CAPSULE ORAL
Qty: 300 CAPSULE | Refills: 3 | Status: SHIPPED | OUTPATIENT
Start: 2022-03-29 | End: 2022-07-27

## 2022-03-29 NOTE — TELEPHONE ENCOUNTER
Patient is taking 900 mg in the am, 900 mg in the afternoon and 1200 in the evening. I have attached a 30 day supply. Please approve if correct.

## 2022-03-29 NOTE — TELEPHONE ENCOUNTER
PATIENT CALLED AND STATES PATIENT IS CURRENTLY OUT OF GABAPENTIN.   EXPLAINED THAT WE ARE WAITING FOR DR. READ  TO SIGN.  PATIENT IS HOPING THAT HAPPENS SOON.    THANK YOU

## 2022-04-04 ENCOUNTER — TELEPHONE (OUTPATIENT)
Dept: NEUROSURGERY | Facility: CLINIC | Age: 62
End: 2022-04-04

## 2022-04-04 DIAGNOSIS — Z48.89 POSTOPERATIVE VISIT: ICD-10-CM

## 2022-04-04 RX ORDER — CYCLOBENZAPRINE HCL 10 MG
10 TABLET ORAL 3 TIMES DAILY PRN
Qty: 40 TABLET | Refills: 0 | Status: SHIPPED | OUTPATIENT
Start: 2022-04-04

## 2022-04-04 NOTE — TELEPHONE ENCOUNTER
Ambar Haynes, APRN  to Me    LT      4/4/22 3:05 PM  That would be fine. Please send in fir 10 mg Flexeril TID. Number 40. No refill.

## 2022-04-04 NOTE — TELEPHONE ENCOUNTER
Patient is calling to see if we have anything else to offer him as he awaits his surgery with Dr. Foster and Dr. Rosado. He is scheduled for surgery on 05-20-22 and that is the soonest we are able to get him in for surgery due to availability of Dr. Rosado. He is calling complaining of extreme numbness in his bilateral feet. He reports that his right leg is weak and gives out on him.He was last seen in the office on 03-23-22. He had a two-level lumbar decompression and fusion at L4 and L5 and L5-S1 on 01-06-22 by Dr. BOWLES

## 2022-04-04 NOTE — TELEPHONE ENCOUNTER
Patient is not taking lyrica. He is wanting to know if he can be prescribed a muscle relaxer. He had flexeril 10 mg after his initial surgery.

## 2022-04-04 NOTE — TELEPHONE ENCOUNTER
"  Caller: Christiano Barr    Relationship: Self    Best call back number: [phone]    What is the best time to reach you: ANY    Who are you requesting to speak with (clinical staff, provider,  specific staff member): CLINICAL STAFF    Do you know the name of the person who called: HAS SPOKEN WITH RENATA REGARDING THESE SYMPTOMS BEFORE.     What was the call regarding: SURGERY SCHEDULED FOR 5/20/22; PATIENT IS CALLING TO ASK IF THERE IS \"ANYTHING DR. READ CAN DO UNTIL HIS SURGERY TO HELP.\" PATIENT IS EXPERIENCING EXTREME FOOT NUMBNESS IN BOTH FEET, RIGHT LEG JUST \"GIVES OUT, HE HAS TO TAKE THE WEIGHT OFF IT.\"    PLEASE CONTACT PATIENT TO ADVISE IF THERE'S ANYTHING TO BE DONE OR PRESCRIBED UNTIL HIS SURGERY.     THANK YOU VERY MUCH.   "

## 2022-04-08 ENCOUNTER — TELEPHONE (OUTPATIENT)
Dept: NEUROSURGERY | Facility: CLINIC | Age: 62
End: 2022-04-08

## 2022-04-12 NOTE — TELEPHONE ENCOUNTER
Caller: Christiano Barr    Relationship: Self    Best call back number:[phone]    What is the best time to reach you: ANYTIME    Who are you requesting to speak with (clinical staff, provider,  specific staff member): CLINICAL STAFF    Do you know the name of the person who called:SEB SPICER    What was the call regarding: PT WAS RETURNING A MISSED CALL-PT ALSO MENTIONED THAT HE FEELS LIKE HE IS BECOMING DEPRESSED-HE WANTED TO KNOW IF  COULD HELP HIM WITH THIS OR DID HE NEED TO CONTACT HIS PCP OFFICE-PT STATES HE WAS GOING TO CALL HIS PCP OFFICE TO CHECK WITH THEM-ADVISING OFFICE THANK YOU!    Do you require a callback: YES

## 2022-04-22 NOTE — TELEPHONE ENCOUNTER
I called to check on this pt today. He is still feeling a bit useless I believe were his words but he is better. He did call his PCP and was given an Rx for Cymbalta (he thinks).

## 2022-05-02 ENCOUNTER — TELEPHONE (OUTPATIENT)
Dept: NEUROSURGERY | Facility: CLINIC | Age: 62
End: 2022-05-02

## 2022-05-02 NOTE — TELEPHONE ENCOUNTER
Patient called with questions about which medications he needs to stop before surgery, and would like a call back.

## 2022-05-03 NOTE — TELEPHONE ENCOUNTER
I SPOKE WITH PT AND INFORMED HIM THAT I HAVE SENT A MESSAGE TO DR READ REGARDING STOPPING HIS GABAPENTIN AND THAT I WILL CALL HIM BACK ONCE DR READ RESPONDS TO MY MSG. PT ALSO ADVISED TO STOP ALL ASPIRIN,BLOOD THINNERS, AND NSAIDS 5 DAYS PRIOR TO HIS SURGERY ON 5/20/22. PT VOICED UNDERSTANDING OF ALL INSTRUCTIONS.

## 2022-05-06 NOTE — TELEPHONE ENCOUNTER
Patient is currently taking 900 mg in the morning and afternoon and 1200 mg prior to bedtime. He would like to start weaning himself down but would like to do it the right way.

## 2022-05-06 NOTE — TELEPHONE ENCOUNTER
I called and spoke with the patient and advised that he could maintain his dose of 600 mg TID. Dr. Brewer would also like for him to be reevaluated in the office as his pain is better. I told the patient I will be in touch with him on Monday after speaking with Dr. Brewer.

## 2022-05-06 NOTE — TELEPHONE ENCOUNTER
"He reports that his concern with taking the Gabapentin is that he feels \"drunk.\" He states that he has taken himself down to 600 mg TID and he states he is feeling better with more energy. He reports he has minimal pain in his right foot but it is manageable and he has no pain at all in his left foot.   "

## 2022-05-06 NOTE — TELEPHONE ENCOUNTER
"I called and LVM for the patient advising that per Dr. Brewer \"Tell him I do not think it is advisable to start weaning the gabapentin until after his surgery so he should keep it at the same dose.\"     He should return call with any further questions or concerns.   "

## 2022-05-09 ENCOUNTER — TELEPHONE (OUTPATIENT)
Dept: NEUROSURGERY | Facility: CLINIC | Age: 62
End: 2022-05-09

## 2022-05-09 NOTE — TELEPHONE ENCOUNTER
I S/W PT REGARDING COMING IN FOR A FOLLOW UP APPOINTMENT WITH DR READ TO DISCUSS HIS SURGERY AND FOOT/LEG/BACK PAIN THIS Thursday AT 2:30PM. PT AGREES TO COME TO THE OFFICE FOR APPOINTMENT.

## 2022-05-10 NOTE — PROGRESS NOTES
Subjective   Patient ID: Christiano Barr is a 61 y.o. male is here today for follow-up to discuss surgery options (5/20/22). Pt last seen on 3/23/22 for congenital spondylolysis in the lumbosacral region.    Today Mr. Barr reports he is doing quite a bit better. He states he has weaned himself down on his gabapentin to 600 mg TID. He reports his low back pain is manageable until he is dealing with constipation. He states he has right leg pain on his lateral thigh if he touches it. He states he also has pain on the top of his foot.     The patient is here with his wife.  He was actually feeling better since the last time I saw him and I wanted to talk to him about this.  The burning in his feet has improved significantly.  His left leg is perfectly fine.  The residual pain that he had in his right buttock and leg is also vastly improved.  He has very little low back pain.  His strength is normal.  The migrated cage was actually on the left side at L5-S1 and he is asymptomatic from that.  I told him I thought it be best given the improvement to cancel the surgery which we will.  The turnaround began shortly after I saw him about 6 weeks ago and has continued to improve significantly over the last 2 or 3 weeks.  Given that will restart some therapy.  He wanted to wean his gabapentin.  We had him on much higher doses that he is taking 600 mg 3 times daily for now.  I told him to bring it down to 600 mg twice daily and keep it there.  I will see him in 2 months with x-rays and after therapy.  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  I told him not to lift more than 20 pounds and not to do any excessive bending twisting or lifting.  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    Back Pain  This is a chronic problem. The current episode started more than 1 year ago. The problem occurs  constantly. The problem has been gradually improving since onset. The pain is present in the lumbar spine. The quality of the pain is described as aching. The pain radiates to the right foot and right thigh. The pain is at a severity of 2/10. The pain is mild. The symptoms are aggravated by standing, twisting and bending. Associated symptoms include leg pain and numbness. Pertinent negatives include no bladder incontinence, bowel incontinence, fever, tingling or weakness.       The following portions of the patient's history were reviewed and updated as appropriate: allergies, current medications, past family history, past medical history, past social history, past surgical history and problem list.    Review of Systems   Constitutional: Negative for activity change and fever.   Gastrointestinal: Negative for bowel incontinence.   Genitourinary: Negative for bladder incontinence.   Musculoskeletal: Positive for back pain.   Neurological: Positive for numbness. Negative for tingling and weakness.   Psychiatric/Behavioral: Positive for sleep disturbance.   All other systems reviewed and are negative.      Objective   Physical Exam  Constitutional:       Appearance: He is well-developed.   HENT:      Head: Normocephalic and atraumatic.   Eyes:      Extraocular Movements: EOM normal.      Conjunctiva/sclera: Conjunctivae normal.      Pupils: Pupils are equal, round, and reactive to light.   Neck:      Vascular: No carotid bruit.   Neurological:      Mental Status: He is oriented to person, place, and time.      Coordination: Finger-Nose-Finger Test and Heel to Shin Test normal.      Gait: Gait is intact.      Deep Tendon Reflexes:      Reflex Scores:       Tricep reflexes are 2+ on the right side and 2+ on the left side.       Bicep reflexes are 2+ on the right side and 2+ on the left side.       Brachioradialis reflexes are 2+ on the right side and 2+ on the left side.       Patellar reflexes are 2+ on the right side  and 2+ on the left side.       Achilles reflexes are 2+ on the right side and 2+ on the left side.  Psychiatric:         Speech: Speech normal.       Neurologic Exam     Mental Status   Oriented to person, place, and time.   Registration of memory: Good recent and remote memory.   Attention: normal. Concentration: normal.   Speech: speech is normal   Level of consciousness: alert  Knowledge: consistent with education.     Cranial Nerves     CN II   Visual fields full to confrontation.   Visual acuity: normal    CN III, IV, VI   Pupils are equal, round, and reactive to light.  Extraocular motions are normal.     CN V   Facial sensation intact.   Right corneal reflex: normal  Left corneal reflex: normal    CN VII   Facial expression full, symmetric.   Right facial weakness: none  Left facial weakness: none    CN VIII   Hearing: intact    CN IX, X   Palate: symmetric    CN XI   Right sternocleidomastoid strength: normal  Left sternocleidomastoid strength: normal    CN XII   Tongue: not atrophic  Tongue deviation: none    Motor Exam   Muscle bulk: normal  Right arm tone: normal  Left arm tone: normal  Right leg tone: normal  Left leg tone: normal    Strength   Strength 5/5 except as noted.     Sensory Exam   Light touch normal.     Gait, Coordination, and Reflexes     Gait  Gait: normal    Coordination   Finger to nose coordination: normal  Heel to shin coordination: normal    Reflexes   Right brachioradialis: 2+  Left brachioradialis: 2+  Right biceps: 2+  Left biceps: 2+  Right triceps: 2+  Left triceps: 2+  Right patellar: 2+  Left patellar: 2+  Right achilles: 2+  Left achilles: 2+  Right : 2+  Left : 2+      [unfilled]  Independent Review of Radiographic Studies:      I reviewed the most recent CT scan done on 3/11/2022 which shows displacement of the left-sided interbody cage at L5-S1 into the spinal canal.  Agree with the report.  The other cages look okay.    Medical Decision Making:       Given the improvement we will cancel the surgery for next week.  I told him to stay on gabapentin at 600 mg twice daily and to start therapy again.  We will see him in 2 months with x-rays.  If anything happens between now then he is to let me know.      Diagnoses and all orders for this visit:    1. Congenital spondylolysis, lumbosacral region (Primary)  -     Ambulatory Referral to Physical Therapy Evaluate and treat  -     XR Spine Lumbar Complete With Flex & Ext; Future    2. Orthopedic hardware present  -     Ambulatory Referral to Physical Therapy Evaluate and treat  -     XR Spine Lumbar Complete With Flex & Ext; Future    3. Status post lumbar spinal fusion  -     Ambulatory Referral to Physical Therapy Evaluate and treat  -     XR Spine Lumbar Complete With Flex & Ext; Future      Return in about 2 months (around 7/12/2022) for Face-to-face.

## 2022-05-12 ENCOUNTER — OFFICE VISIT (OUTPATIENT)
Dept: NEUROSURGERY | Facility: CLINIC | Age: 62
End: 2022-05-12

## 2022-05-12 VITALS
HEIGHT: 67 IN | OXYGEN SATURATION: 97 % | TEMPERATURE: 97.8 F | WEIGHT: 213.19 LBS | DIASTOLIC BLOOD PRESSURE: 84 MMHG | BODY MASS INDEX: 33.46 KG/M2 | HEART RATE: 83 BPM | SYSTOLIC BLOOD PRESSURE: 120 MMHG

## 2022-05-12 DIAGNOSIS — Q76.2 CONGENITAL SPONDYLOLYSIS, LUMBOSACRAL REGION: Primary | ICD-10-CM

## 2022-05-12 DIAGNOSIS — Z97.8 ORTHOPEDIC HARDWARE PRESENT: ICD-10-CM

## 2022-05-12 DIAGNOSIS — Z98.1 STATUS POST LUMBAR SPINAL FUSION: ICD-10-CM

## 2022-05-12 PROCEDURE — 99214 OFFICE O/P EST MOD 30 MIN: CPT | Performed by: NEUROLOGICAL SURGERY

## 2022-05-12 RX ORDER — DULOXETIN HYDROCHLORIDE 30 MG/1
CAPSULE, DELAYED RELEASE ORAL
COMMUNITY
Start: 2022-04-12 | End: 2023-03-28 | Stop reason: ALTCHOICE

## 2022-05-17 ENCOUNTER — APPOINTMENT (OUTPATIENT)
Dept: PREADMISSION TESTING | Facility: HOSPITAL | Age: 62
End: 2022-05-17

## 2022-06-21 ENCOUNTER — TELEPHONE (OUTPATIENT)
Dept: NEUROSURGERY | Facility: CLINIC | Age: 62
End: 2022-06-21

## 2022-06-21 NOTE — TELEPHONE ENCOUNTER
Pt states he has fallen 3 x in the last 5 days for no apparent reason.  He thinks it may be the Gabapentin 300 mg 1 am, 2 pm.  Would like to wean off and needs a schedule for that.  [phone]

## 2022-06-23 NOTE — TELEPHONE ENCOUNTER
Per Dr. Brewer the patient can change his gabapentin dosage to 300 mg BID. I have also scheduled him for a telephone visit tomorrow to discuss this Dr. Brewer, sooner rather than later given the recent falls.

## 2022-06-24 ENCOUNTER — OFFICE VISIT (OUTPATIENT)
Dept: NEUROSURGERY | Facility: CLINIC | Age: 62
End: 2022-06-24

## 2022-06-24 DIAGNOSIS — Q76.2 CONGENITAL SPONDYLOLYSIS, LUMBOSACRAL REGION: Primary | ICD-10-CM

## 2022-06-24 DIAGNOSIS — R20.0 NUMBNESS AND TINGLING OF BOTH FEET: ICD-10-CM

## 2022-06-24 DIAGNOSIS — Z97.8 ORTHOPEDIC HARDWARE PRESENT: ICD-10-CM

## 2022-06-24 DIAGNOSIS — R20.2 NUMBNESS AND TINGLING OF BOTH FEET: ICD-10-CM

## 2022-06-24 DIAGNOSIS — Z98.1 HISTORY OF LUMBAR FUSION: ICD-10-CM

## 2022-06-24 PROCEDURE — 99442 PR PHYS/QHP TELEPHONE EVALUATION 11-20 MIN: CPT | Performed by: NEUROLOGICAL SURGERY

## 2022-06-24 RX ORDER — HYDROCHLOROTHIAZIDE 12.5 MG/1
12.5 TABLET ORAL DAILY
COMMUNITY
Start: 2022-05-18

## 2022-06-24 RX ORDER — MELOXICAM 15 MG/1
15 TABLET ORAL DAILY
COMMUNITY
Start: 2022-05-13

## 2022-06-24 RX ORDER — CIPROFLOXACIN 500 MG/1
500 TABLET, FILM COATED ORAL 2 TIMES DAILY
COMMUNITY
Start: 2022-06-14

## 2022-06-24 NOTE — PROGRESS NOTES
Subjective   Patient ID: Christiano Barr is a 61 y.o. male is here today for follow-up for leg weakness and falls, also to discuss gabapentin dosage.     You have chosen to receive care through a telephone visit. Do you consent to use a telephone visit for your medical care today? YES    Mr. Barr reports his back pain has subsided. He reports he is still having balance issues. He reports he has not fell in 3 day but reports he has had 3 falls in the last week. He reports since surgery his feet are very cold and right foot seems to have more pain. He denies any new leg pain. He reports of some numbness on his right foot behind his toes. He denies any bowel or bladder incontinence.  He reports he does not take anything for pain.      Unable to complete visit using a video connection to the patient. A phone visit was used to complete this visits. Total time of discussion was 12 minutes.    I called from the hospital.  The patient was at home.  He was supposed to stay on gabapentin at 600 mg twice daily but he reduced it to 300 mg twice daily and his feet started to bother him again in terms of numbness and sometimes pain.  Legs are fine.  He had been dizzy and he attributed to the gabapentin which I am not entirely convinced is the reason.  He went to see his primary care physician and blood work was obtained.  He has yet to discuss it with his PCP.  His blood pressure apparently was fine.  I told him to that we should stay at 600 mg twice daily and he agreed to go back on it.  We will reassess him at the neck scheduled visit in late July.  I am not sure the dizziness is related to his spine but I will need to examine him to decide if he needs to be imaged in the cervical or thoracic region.    Back Pain  This is a chronic problem. The current episode started more than 1 year ago. The problem occurs intermittently. The problem has been gradually improving since onset. The pain is present in the lumbar  spine. The quality of the pain is described as aching. The pain is at a severity of 0/10. The patient is experiencing no pain. The symptoms are aggravated by position. Associated symptoms include leg pain, numbness and tingling. Pertinent negatives include no bladder incontinence or bowel incontinence. He has tried nothing for the symptoms. The treatment provided significant relief.       The following portions of the patient's history were reviewed and updated as appropriate: allergies, current medications, past family history, past medical history, past social history, past surgical history and problem list.    Review of Systems   Gastrointestinal: Negative for bowel incontinence.   Genitourinary: Negative for bladder incontinence.   Musculoskeletal: Positive for back pain.   Neurological: Positive for tingling and numbness.           Objective     There were no vitals filed for this visit.  There is no height or weight on file to calculate BMI.      Physical Exam  Neurologic Exam        Assessment & Plan   Independent Review of Radiographic Studies:      I personally reviewed the images from the following studies.    I reviewed the most recent CT scan done on 3/11/2022 which shows displacement of the left-sided interbody cage at L5-S1 into the spinal canal.  Agree with the report.  The other cages look okay.      Medical Decision Making:      He will go back on the gabapentin at 600 mg twice daily and we will reevaluate him on 7/27/2022 as originally planned.  If we need to do more imaging of his thoracic or cervical spine, we will decide at that time.      Diagnoses and all orders for this visit:    1. Congenital spondylolysis, lumbosacral region (Primary)    2. Orthopedic hardware present    3. Numbness and tingling of both feet    4. History of lumbar fusion      Return in about 1 month (around 7/27/2022) for Already has a visit on this day, keep it.

## 2022-07-27 ENCOUNTER — HOSPITAL ENCOUNTER (OUTPATIENT)
Dept: GENERAL RADIOLOGY | Facility: HOSPITAL | Age: 62
Discharge: HOME | End: 2022-07-27

## 2022-07-27 ENCOUNTER — OFFICE VISIT (OUTPATIENT)
Dept: NEUROSURGERY | Facility: CLINIC | Age: 62
End: 2022-07-27

## 2022-07-27 VITALS
DIASTOLIC BLOOD PRESSURE: 80 MMHG | SYSTOLIC BLOOD PRESSURE: 140 MMHG | BODY MASS INDEX: 33.49 KG/M2 | WEIGHT: 213.41 LBS | HEART RATE: 72 BPM | HEIGHT: 67 IN | TEMPERATURE: 97.7 F | OXYGEN SATURATION: 98 %

## 2022-07-27 DIAGNOSIS — Z98.1 HISTORY OF LUMBAR SPINAL FUSION: ICD-10-CM

## 2022-07-27 DIAGNOSIS — Z97.8 ORTHOPEDIC HARDWARE PRESENT: ICD-10-CM

## 2022-07-27 DIAGNOSIS — Z98.1 STATUS POST LUMBAR SPINAL FUSION: ICD-10-CM

## 2022-07-27 DIAGNOSIS — M79.671 RIGHT FOOT PAIN: ICD-10-CM

## 2022-07-27 DIAGNOSIS — Q76.2 CONGENITAL SPONDYLOLYSIS, LUMBOSACRAL REGION: ICD-10-CM

## 2022-07-27 DIAGNOSIS — T84.226D DISPLACEMENT OF INTERNAL FIXATION DEVICE OF VERTEBRAE, SUBSEQUENT ENCOUNTER: ICD-10-CM

## 2022-07-27 DIAGNOSIS — Q76.2 CONGENITAL SPONDYLOLYSIS, LUMBOSACRAL REGION: Primary | ICD-10-CM

## 2022-07-27 PROCEDURE — 73630 X-RAY EXAM OF FOOT: CPT

## 2022-07-27 PROCEDURE — 99214 OFFICE O/P EST MOD 30 MIN: CPT | Performed by: NEUROLOGICAL SURGERY

## 2022-07-27 PROCEDURE — 72114 X-RAY EXAM L-S SPINE BENDING: CPT

## 2022-07-27 RX ORDER — GABAPENTIN 300 MG/1
CAPSULE ORAL
Qty: 120 CAPSULE | Refills: 5 | Status: SHIPPED | OUTPATIENT
Start: 2022-07-27

## 2022-07-27 NOTE — PROGRESS NOTES
Subjective   Patient ID: Christiano Barr is a 61 y.o. male is here today for follow-up for back pain. He had surgery on 1/6/22.    Mr. Barr reports overall he is doing well. He still gets some muscle soreness in his back, most of his soreness are in the muscles right next to his spine and right above his buttocks. He reports some right thigh pain and no left leg pain. He reports when he walks long distances he feels like someone is stabbing him in his back. He reports he still has a pain in his right foot in between his big toe. He reports these pains are things that he can deal with at this point.     He is with his wife.  Its been about 6 months since his L4-S1 decompression and fusion.  Overall he has been doing well.  The radicular pain that he was experiencing before surgery is essentially gone.  The displaced cage on the left at L5-S1 is not causing any symptoms so I do not think we need to do anything about it.  He certainly does not have any left sided sciatica.  The burning sensation in his feet has gotten better and I have encouraged him to stay on the gabapentin at a total of 1200 mg a day but he wanted to divide it differently because he was a little sleepy during the daytime so we will make it 300/900 and keep it there for now.  He is very tender on the dorsal part of his right foot which makes me think that there may be an intrinsic foot problem and that this is not related to his spinal nerves.  We will get an x-ray of his right foot and have Dr. Wagner take a look at him.  I will see him in 6 months with x-rays.        History of Present Illness    The following portions of the patient's history were reviewed and updated as appropriate: allergies, current medications, past family history, past medical history, past social history, past surgical history and problem list.    Review of Systems   Constitutional: Negative for activity change and fever.   Musculoskeletal: Positive for back  "pain.   Neurological: Positive for weakness and numbness.   Psychiatric/Behavioral: Positive for sleep disturbance.   All other systems reviewed and are negative.          Objective     Vitals:    07/27/22 1117   BP: 140/80   Pulse: 72   Temp: 97.7 °F (36.5 °C)   SpO2: 98%   Weight: 96.8 kg (213 lb 6.5 oz)   Height: 170.2 cm (67\")     Body mass index is 33.42 kg/m².      Physical Exam  Constitutional:       Appearance: He is well-developed.   HENT:      Head: Normocephalic and atraumatic.   Eyes:      Extraocular Movements: EOM normal.      Conjunctiva/sclera: Conjunctivae normal.      Pupils: Pupils are equal, round, and reactive to light.   Neck:      Vascular: No carotid bruit.   Neurological:      Mental Status: He is oriented to person, place, and time.      Coordination: Finger-Nose-Finger Test and Heel to Shin Test normal.      Gait: Gait is intact.      Deep Tendon Reflexes:      Reflex Scores:       Tricep reflexes are 2+ on the right side and 2+ on the left side.       Bicep reflexes are 2+ on the right side and 2+ on the left side.       Brachioradialis reflexes are 2+ on the right side and 2+ on the left side.       Patellar reflexes are 2+ on the right side and 2+ on the left side.       Achilles reflexes are 2+ on the right side and 2+ on the left side.  Psychiatric:         Speech: Speech normal.       Neurologic Exam     Mental Status   Oriented to person, place, and time.   Registration of memory: Good recent and remote memory.   Attention: normal. Concentration: normal.   Speech: speech is normal   Level of consciousness: alert  Knowledge: consistent with education.     Cranial Nerves     CN II   Visual fields full to confrontation.   Visual acuity: normal    CN III, IV, VI   Pupils are equal, round, and reactive to light.  Extraocular motions are normal.     CN V   Facial sensation intact.   Right corneal reflex: normal  Left corneal reflex: normal    CN VII   Facial expression full, symmetric. "   Right facial weakness: none  Left facial weakness: none    CN VIII   Hearing: intact    CN IX, X   Palate: symmetric    CN XI   Right sternocleidomastoid strength: normal  Left sternocleidomastoid strength: normal    CN XII   Tongue: not atrophic  Tongue deviation: none    Motor Exam   Muscle bulk: normal  Right arm tone: normal  Left arm tone: normal  Right leg tone: normal  Left leg tone: normal    Strength   Strength 5/5 except as noted.     Sensory Exam   Light touch normal.     Gait, Coordination, and Reflexes     Gait  Gait: normal    Coordination   Finger to nose coordination: normal  Heel to shin coordination: normal    Reflexes   Right brachioradialis: 2+  Left brachioradialis: 2+  Right biceps: 2+  Left biceps: 2+  Right triceps: 2+  Left triceps: 2+  Right patellar: 2+  Left patellar: 2+  Right achilles: 2+  Left achilles: 2+  Right : 2+  Left : 2+          Assessment & Plan   Independent Review of Radiographic Studies:      I personally reviewed the images from the following studies.    I reviewed his x-rays done on 7/27/2022 which show the pedicle screw and susan fixation from L4-S1.  The cages at L4-L5 are stable.  The left cage at L5-S1 has not moved any more than it was displaced the last time we looked at this.  The right cage at L5-S1 looks fine.  Agree with the report.        Medical Decision Making:      We will get some x-rays of his right foot and have him see orthopedics.  He will continue the gabapentin but now dosed at 300/900 which I refilled.  I will see him in 6 months with x-rays.      Diagnoses and all orders for this visit:    1. Congenital spondylolysis, lumbosacral region (Primary)  -     XR Spine Lumbar Complete With Flex & Ext; Future  -     gabapentin (NEURONTIN) 300 MG capsule; Take 1 capsule in the morning, 3 capsules in the evening  Dispense: 120 capsule; Refill: 5    2. Displacement of internal fixation device of vertebrae, subsequent encounter  -     XR Spine Lumbar  Complete With Flex & Ext; Future  -     gabapentin (NEURONTIN) 300 MG capsule; Take 1 capsule in the morning, 3 capsules in the evening  Dispense: 120 capsule; Refill: 5    3. History of lumbar spinal fusion  -     XR Spine Lumbar Complete With Flex & Ext; Future  -     gabapentin (NEURONTIN) 300 MG capsule; Take 1 capsule in the morning, 3 capsules in the evening  Dispense: 120 capsule; Refill: 5    4. Right foot pain  -     XR foot 3+ vw right; Future  -     Ambulatory Referral to Orthopedic Surgery      Return in about 6 months (around 1/27/2023) for Face-to-face.

## 2022-08-31 ENCOUNTER — OFFICE VISIT (OUTPATIENT)
Dept: ORTHOPEDIC SURGERY | Facility: CLINIC | Age: 62
End: 2022-08-31

## 2022-08-31 VITALS — HEIGHT: 67 IN | TEMPERATURE: 98.2 F | WEIGHT: 229.8 LBS | BODY MASS INDEX: 36.07 KG/M2

## 2022-08-31 DIAGNOSIS — R52 PAIN: ICD-10-CM

## 2022-08-31 DIAGNOSIS — G57.90 NEURITIS OF FOOT, UNSPECIFIED LATERALITY: Primary | ICD-10-CM

## 2022-08-31 PROCEDURE — 73630 X-RAY EXAM OF FOOT: CPT | Performed by: ORTHOPAEDIC SURGERY

## 2022-08-31 PROCEDURE — 99244 OFF/OP CNSLTJ NEW/EST MOD 40: CPT | Performed by: ORTHOPAEDIC SURGERY

## 2022-08-31 NOTE — PROGRESS NOTES
"   New Patient Complaint      Patient: Christiano Barr  YOB: 1960 61 y.o. male  Medical Record Number: [id number]    Chief Complaints: Feet hurt    History of Present Illness: Patient reports having lumbar spine surgery by Dr. Brewer in January of this year.  Prior to his back surgery he said he did not have any problems with his feet and subsequent to his surgery had a feeling of severe burning pain in both feet like he was \"walking on glass\".  Symptoms have improved and patient has been on gabapentin and I believe is currently on 1200 mg of that.  The majority of his foot pain has improved but he has persistent complaints of pain in the right greater than left foot mainly in the dorsum of the midfoot in the first webspace.  Symptoms are worse after standing and driving moving the foot.            He is seen today at the request of Dr. Brewer who has requested my opinion regarding etiology and treatment of this condition        HPI    Allergies: No Known Allergies    Medications:   Current Outpatient Medications on File Prior to Visit   Medication Sig   • ciprofloxacin (CIPRO) 500 MG tablet Take 500 mg by mouth 2 (Two) Times a Day.   • cyclobenzaprine (FLEXERIL) 10 MG tablet Take 1 tablet by mouth 3 (Three) Times a Day As Needed for Muscle Spasms.   • DULoxetine (CYMBALTA) 30 MG capsule TAKE ONE CAPSULE BY MOUTH EVERY MORNING FOR 2 WEEKS THEN 2 CAPSULES BY MOUTH EVERY MORNING   • gabapentin (NEURONTIN) 300 MG capsule Take 1 capsule in the morning, 3 capsules in the evening   • Glucosamine-Chondroit-Vit C-Mn (GLUCOSAMINE 1500 COMPLEX PO) Take  by mouth. Unknown dosage, OTC, takes 2 tabs daily   • hydroCHLOROthiazide (HYDRODIURIL) 12.5 MG tablet Take 12.5 mg by mouth Daily.   • losartan-hydrochlorothiazide (HYZAAR) 100-25 MG per tablet Take 1 tablet by mouth Daily.   • meloxicam (MOBIC) 15 MG tablet Take 15 mg by mouth Daily.   • metoprolol succinate XL (TOPROL-XL) 25 MG 24 hr tablet " Take 1 tablet by mouth Daily.   • montelukast (SINGULAIR) 10 MG tablet Take 10 mg by mouth Daily.   • ondansetron (ZOFRAN) 8 MG tablet Take 8 mg by mouth Every 8 (Eight) Hours As Needed.   • pantoprazole (PROTONIX) 40 MG EC tablet Take 40 mg by mouth Daily.   • pravastatin (PRAVACHOL) 20 MG tablet Take 20 mg by mouth Daily.   • pregabalin (Lyrica) 25 MG capsule Take 1 capsule by mouth 2 (Two) Times a Day.   • sennosides-docusate (PERICOLACE) 8.6-50 MG per tablet Take 2 tablets by mouth 2 (Two) Times a Day.   • tamsulosin (FLOMAX) 0.4 MG capsule 24 hr capsule Take 1 capsule by mouth Daily.   • traMADol (ULTRAM) 50 MG tablet Take 1 tablet by mouth Every 8 (Eight) Hours As Needed for Moderate Pain .     No current facility-administered medications on file prior to visit.       Past Medical History:   Diagnosis Date   • Acid reflux    • Arthritis     OSTEO   • Bradycardia     40's-50's    • Congenital spondylolysis, lumbosacral region    • Hypertension    • Low back pain    • Peripheral neuropathy    • Pre-diabetes    • Risk factors for obstructive sleep apnea     YOAN 7   • Sleep apnea     instructed to bring cpap for surgery    • Spinal stenosis of lumbar region with radiculopathy      Past Surgical History:   Procedure Laterality Date   • APPENDECTOMY     • INGUINAL HERNIA REPAIR Right    • LUMBAR FUSION N/A 1/6/2022    Procedure: Open lumbar decompression with posterior lumbar interbody fusion with cages and pedicle scew/susan fixation lumbar four/five, lumbar five/sacral one using the [employer] robot;  Surgeon: Luis Brewer MD;  Location: [facility] OR;  Service: Robotics - Neuro;  Laterality: N/A;   • TOTAL KNEE ARTHROPLASTY Left 1/18/2018    Procedure: LT TOTAL KNEE ARTHROPLASTY;  Surgeon: Ralph Panchal MD;  Location: [facility] OR;  Service:      Social History     Occupational History   • Not on file   Tobacco Use   • Smoking status: Never Smoker   • Smokeless tobacco: Never Used   Vaping Use   • Vaping  "Use: Never used   Substance and Sexual Activity   • Alcohol use: Yes     Alcohol/week: 8.0 standard drinks     Types: 8 Cans of beer per week     Comment: 3-4 beers a day    • Drug use: No   • Sexual activity: Defer      Social History     Social History Narrative   • Not on file     Family History   Problem Relation Age of Onset   • Malig Hyperthermia Neg Hx        Review of Systems: 14 point review of systems performed, positive pertinent findings identified in HPI. All remaining systems negative     Review of Systems      Physical Exam:   Vitals:    08/31/22 0918   Temp: 98.2 °F (36.8 °C)   Weight: 104 kg (229 lb 12.8 oz)   Height: 170.2 cm (67\")   PainSc:   4     Physical Exam   Constitutional: pleasant, well developed He is with his wife  Eyes: sclera non icteric  Hearing : adequate for exam  Cardiovascular: palpable pulses in bilaeral feet, bilateral calves/ thighs NT without sign of DVT  Respiratoy: breathing unlabored   Neurological: grossly sensate to LT throughout bilateral LEs but with some diminished sensation in the first webspace  Psychiatric: oriented with normal mood and affect.   Lymphatic: No palpable popliteal lymphadenopathy bilateral LEs  Skin: intact throughout bilateral legs/feet  Musculoskeletal: Both feet showed no focal tenderness along the Achilles or dorsum of the mid foot to palpation however Tinel's over the deep peroneal nerve at the midfoot did elicit the pain that he is experiencing in the first webspace.  He was nontender about the first MTP joints to palpation or with range of motion.  He was nontender along the base of the fifth metatarsals and had good eversion strength of the peroneals without pain  Physical Exam  Ortho Exam    Radiology: 3 views of both feet ordered evaluate pain reviewed and compared to prior x-ray of the right foot on the [facility] system from 7/27/2022.  I do not see any obvious fracture.  There are some mild arthritis of the first MTP joints with minimal " "hallux valgus.  No see any obvious fractures at the midfoot nor along the metatarsals or tarsals.  There is chronic appearing ossification just proximal to the bases of the fifth metatarsals but nothing that appears acute.  There is some posterior calcaneal spurring  left more so than right with prominent superior calcaneal tuberosity and plantar calcaneal spurring.  There may be some mild arthritic change at the midfoot left more so than right around the tarsometatarsal joints but nothing dramatic.    Assessment/Plan: 1.  Bilateral foot deep peroneal neuritis  2.  Bilateral asymptomatic insertional Achilles calcifications left greater than right-asymptomatic  3.  Mild bilateral midfoot arthritis and first MTP arthritis-asymptomatic    I had a long discussion with patient and his wife today that his symptoms really seem to be neuritic in origin localizing to over the deep peroneal nerve at the midfoot bilaterally with Tinel's eliciting the similar symptoms to what he is having at the first webspace.  Additionally he was not having any problems with this prior to his back surgery.    I do not really feel that it is anything intrinsic to the foot that I can help him with as far as this pain as it seems to be mainly neuritic in origin    I do not really see any appreciable exostosis or anything that would be pushing on those nerves as well.    Reviewed him there really was not anything I could offer from a surgical standpoint for him and he was asking about having something done with the nerves and told him I really did not do any peripheral nerve surgery and he asked about having the \"nerves burned\".  Offered to send him to pain management for further evaluation which he did not want to do at this time.    I did prescribe a compounding cream consisting of lidocaine 5% gabapentin 5% and ketoprofen 10% to apply to these areas twice daily and also get him into some physical therapy to work on desensitization in these " areas.    Will also send him to see Dr. Da Sampson who has done some peripheral nerve surgery on patients of mine to see if he has anything to offer from a surgical standpoint for the patient.  He was given him his name and number and referral was placed in epic.    Reviewed with him that I do not see that I have anything further to offer him and by mutual agreement I will see him back as needed

## 2022-09-02 ENCOUNTER — PATIENT ROUNDING (BHMG ONLY) (OUTPATIENT)
Dept: ORTHOPEDIC SURGERY | Facility: CLINIC | Age: 62
End: 2022-09-02

## 2022-09-02 NOTE — PROGRESS NOTES
A VTM Message has been sent to the patient for [place] [facility]

## 2022-09-15 ENCOUNTER — TELEPHONE (OUTPATIENT)
Dept: ORTHOPEDIC SURGERY | Facility: CLINIC | Age: 62
End: 2022-09-15

## 2022-09-20 ENCOUNTER — TELEPHONE (OUTPATIENT)
Dept: ORTHOPEDIC SURGERY | Facility: CLINIC | Age: 62
End: 2022-09-20

## 2022-09-20 DIAGNOSIS — G57.90 NEURITIS OF FOOT, UNSPECIFIED LATERALITY: Primary | ICD-10-CM

## 2022-09-20 NOTE — TELEPHONE ENCOUNTER
Dr. Wagner    Patient was referred to Dr. Sampson but they do not take his insurance.  Who would you like me to refer him to now for foot neuritis?    (follow up from 09/15/2022 message)

## 2022-09-27 NOTE — TELEPHONE ENCOUNTER
Patient's insurance is not in network with .  I spoke with  about other venues of treatment and he suggested possibly  with [facility].  I reached out to his office and his recommendations were for evaluation by pain management.  I subsequently spoke at length with the patient about this and reviewed with him that I do not know of anything further to do for him as far as any surgical procedure that would reliably help with that was within my capabilities and did not know of anyone else to really refer him.  I offered to give him the names of some other foot and ankle specialist further evaluation which he wanted to hold off on at this time and we decided that he would at least try pain management to see what further they had to offer him.  He did try the compounding cream that I prescribed and said that for the first 2 nights that he used that he had flulike sweating type symptoms that resolved within 10 minutes of washing the compounding cream off so obviously that is not treatment option for him at this point.    He appreciated the call and he will let me know if he wants to the names of any other foot and ankle specialist for evaluation for treatment and we will place a referral to pain management for him.  Also reviewed with him that given the fact that his initial symptoms after onset of this earlier in the year have improved quite a bit that this may continue to improve and may take a year or 18 months or longer.  He appreciated the call

## 2022-10-10 ENCOUNTER — OFFICE VISIT (OUTPATIENT)
Dept: ORTHOPEDIC SURGERY | Facility: CLINIC | Age: 62
End: 2022-10-10

## 2022-10-10 DIAGNOSIS — M25.511 RIGHT SHOULDER PAIN, UNSPECIFIED CHRONICITY: Primary | ICD-10-CM

## 2022-10-10 DIAGNOSIS — S46.011A TRAUMATIC TEAR OF RIGHT ROTATOR CUFF, UNSPECIFIED TEAR EXTENT, INITIAL ENCOUNTER: ICD-10-CM

## 2022-10-10 PROCEDURE — 99213 OFFICE O/P EST LOW 20 MIN: CPT | Performed by: STUDENT IN AN ORGANIZED HEALTH CARE EDUCATION/TRAINING PROGRAM

## 2022-10-10 NOTE — PROGRESS NOTES
Chief Complaint  Pain of the Right Shoulder    Subjective          Christiano Barr presents to [facility] for   History of Present Illness    Christiano presents today for evaluation of his right shoulder.  He reports he fell in June onto his right shoulder on concrete.  He experienced immediate pain at that time.  He has had pain and weakness with elevation since the injury.  He denies any prior right shoulder trauma.  He is right-hand dominant.  No Known Allergies     Social History     Socioeconomic History   • Marital status:    Tobacco Use   • Smoking status: Never   • Smokeless tobacco: Never   Vaping Use   • Vaping Use: Never used   Substance and Sexual Activity   • Alcohol use: Yes     Alcohol/week: 8.0 standard drinks     Types: 8 Cans of beer per week     Comment: 3-4 beers a day    • Drug use: No   • Sexual activity: Defer        I reviewed the patient's chief complaint, history of present illness, review of systems, past medical history, surgical history, family history, social history, medications, and allergy list.     REVIEW OF SYSTEMS    Constitutional: Denies fevers, chills, weight loss  Cardiovascular: Denies chest pain, shortness of breath  Skin: Denies rashes, acute skin changes  Neurologic: Denies headache, loss of consciousness  MSK: Right shoulder pain      Objective   Vital Signs:   There were no vitals taken for this visit.    There is no height or weight on file to calculate BMI.    Physical Exam    General: Alert. No acute distress.   Right upper extremity: No tenderness.  No wounds.  No obvious muscle atrophy.  Active elevation to 100 degrees with pain.  Passive elevation 180 degrees.  External rotation to 45 degrees.  Internal rotation of the waistline.  3 out of 5 and pain with supraspinatus and infraspinatus testing.  5 out of 5 subscapularis testing.  Pain with speeds testing.  No pain with crossarm abduction.  Distal neurovascular intact.   Deltoid fires with shoulder abduction.    Procedures    Imaging Results (Most Recent)     Procedure Component Value Units Date/Time    XR shoulder 2+ vw right [[id number]] Resulted: 10/10/22 0837     Updated: 10/10/22 0838    Narrative:      Indications: Right shoulder pain, history of a fall    Views: AP, Grashey, Scap Y, axillary right shoulder    Findings: No fractures noted.  Advanced AC joint arthritic changes noted.    Mild glenohumeral arthritis.  Slight elevation of the humeral head   compared to the glenoid.  Glenohumeral joint reduced.    Comparative Data: No comparative data available                     Assessment and Plan        XR shoulder 2+ vw right    Result Date: 10/10/2022  Narrative: Indications: Right shoulder pain, history of a fall Views: AP, Grashey, Scap Y, axillary right shoulder Findings: No fractures noted.  Advanced AC joint arthritic changes noted.  Mild glenohumeral arthritis.  Slight elevation of the humeral head compared to the glenoid.  Glenohumeral joint reduced. Comparative Data: No comparative data available        Diagnoses and all orders for this visit:    1. Right shoulder pain, unspecified chronicity (Primary)  -     Cancel: XR Scapula Right  -     Cancel: XR Scapula Right  -     XR shoulder 2+ vw right    2. Traumatic tear of right rotator cuff, unspecified tear extent, initial encounter  -     MRI Shoulder Right Without Contrast; Future        Christiano's history and exam are consistent with a rotator cuff tear secondary to his fall.  We reviewed his x-rays obtained today, no fractures are noted.  An MRI has been ordered to evaluate for rotator cuff tear.  Home exercises provided.  Follow-up after MRI to discuss results and additional treatment options.      Call or return if worsening symptoms.    Scribed for Marshall Kaur MD by Marshall Kaur MD  10/10/2022   08:36 EDT         Follow Up       MRI results    Patient was given instructions and counseling regarding his condition or  for health maintenance advice. Please see specific information pulled into the AVS if appropriate.       I have personally performed the services described in this document as scribed by the above individual and it is both accurate and complete.     Marshall Kaur MD  10/10/22  08:40 EDT

## 2022-10-27 ENCOUNTER — HOSPITAL ENCOUNTER (OUTPATIENT)
Dept: MRI IMAGING | Facility: HOSPITAL | Age: 62
End: 2022-10-27

## 2022-11-03 ENCOUNTER — HOSPITAL ENCOUNTER (OUTPATIENT)
Dept: MRI IMAGING | Facility: HOSPITAL | Age: 62
Discharge: HOME | End: 2022-11-03
Admitting: STUDENT IN AN ORGANIZED HEALTH CARE EDUCATION/TRAINING PROGRAM

## 2022-11-03 DIAGNOSIS — S46.011A TRAUMATIC TEAR OF RIGHT ROTATOR CUFF, UNSPECIFIED TEAR EXTENT, INITIAL ENCOUNTER: ICD-10-CM

## 2022-11-03 PROCEDURE — 73221 MRI JOINT UPR EXTREM W/O DYE: CPT

## 2022-11-09 ENCOUNTER — OFFICE VISIT (OUTPATIENT)
Dept: ORTHOPEDIC SURGERY | Facility: CLINIC | Age: 62
End: 2022-11-09

## 2022-11-09 VITALS — HEART RATE: 67 BPM | OXYGEN SATURATION: 99 % | HEIGHT: 67 IN | BODY MASS INDEX: 35.94 KG/M2 | WEIGHT: 229 LBS

## 2022-11-09 DIAGNOSIS — M12.811 ROTATOR CUFF ARTHROPATHY, RIGHT: Primary | ICD-10-CM

## 2022-11-09 PROCEDURE — 20610 DRAIN/INJ JOINT/BURSA W/O US: CPT | Performed by: STUDENT IN AN ORGANIZED HEALTH CARE EDUCATION/TRAINING PROGRAM

## 2022-11-09 PROCEDURE — 99213 OFFICE O/P EST LOW 20 MIN: CPT | Performed by: STUDENT IN AN ORGANIZED HEALTH CARE EDUCATION/TRAINING PROGRAM

## 2022-11-09 RX ADMIN — LIDOCAINE HYDROCHLORIDE 5 ML: 10 INJECTION, SOLUTION INFILTRATION; PERINEURAL at 08:09

## 2022-11-09 RX ADMIN — TRIAMCINOLONE ACETONIDE 40 MG: 40 INJECTION, SUSPENSION INTRA-ARTICULAR; INTRAMUSCULAR at 08:09

## 2022-11-09 NOTE — PROGRESS NOTES
"Chief Complaint  Pain and Follow-up of the Right Shoulder    Subjective          Christiano Barr presents to [facility] for   History of Present Illness    The patient presents here today for follow up evaluation of the right shoulder. The patient recently had an MRI and is here today for those results. To review, He reports he fell in June onto his right shoulder on concrete.  He experienced immediate pain at that time.  He has had pain and weakness with elevation since the injury.  He denies any prior right shoulder trauma.  He is right-hand dominant.    No Known Allergies     Social History     Socioeconomic History   • Marital status:    Tobacco Use   • Smoking status: Never   • Smokeless tobacco: Never   Vaping Use   • Vaping Use: Never used   Substance and Sexual Activity   • Alcohol use: Yes     Alcohol/week: 8.0 standard drinks     Types: 8 Cans of beer per week     Comment: 3-4 beers a day    • Drug use: No   • Sexual activity: Defer        I reviewed the patient's chief complaint, history of present illness, review of systems, past medical history, surgical history, family history, social history, medications, and allergy list.     REVIEW OF SYSTEMS    Constitutional: Denies fevers, chills, weight loss  Cardiovascular: Denies chest pain, shortness of breath  Skin: Denies rashes, acute skin changes  Neurologic: Denies headache, loss of consciousness  MSK: Right shoulder pain      Objective   Vital Signs:   Pulse 67   Ht 170.2 cm (67\")   Wt 104 kg (229 lb)   SpO2 99%   BMI 35.87 kg/m²     Body mass index is 35.87 kg/m².    Physical Exam    General: Alert. No acute distress.   Right upper extremity: No tenderness.  No wounds.  No obvious muscle atrophy.  Active elevation to 100 degrees with pain.  Passive elevation 180 degrees.  External rotation to 45 degrees.  Internal rotation of the waistline.  3 out of 5 and pain with supraspinatus and infraspinatus " testing.  5 out of 5 subscapularis testing.  Pain with speeds testing.  No pain with crossarm abduction.  Distal neurovascular intact.  Deltoid fires with shoulder abduction.    Large Joint Arthrocentesis  Date/Time: 11/9/2022 8:09 AM  Consent given by: patient  Site marked: site marked  Timeout: Immediately prior to procedure a time out was called to verify the correct patient, procedure, equipment, support staff and site/side marked as required   Supporting Documentation  Indications: pain   Procedure Details  Location: shoulder (right) -   Needle gauge: 21 G.  Medications administered: 5 mL lidocaine 1 %; 40 mg triamcinolone acetonide 40 MG/ML  Patient tolerance: patient tolerated the procedure well with no immediate complications          Imaging Results (Most Recent)     None                   Assessment and Plan        XR shoulder 2+ vw right    Result Date: 10/10/2022  Narrative: Indications: Right shoulder pain, history of a fall Views: AP, Grashey, Scap Y, axillary right shoulder Findings: No fractures noted.  Advanced AC joint arthritic changes noted.  Mild glenohumeral arthritis.  Slight elevation of the humeral head compared to the glenoid.  Glenohumeral joint reduced. Comparative Data: No comparative data available     MRI Shoulder Right Without Contrast    Result Date: 11/4/2022  Narrative: PROCEDURE: MRI SHOULDER RIGHT WO CONTRAST  COMPARISON: [facility] , CR, XR SHOULDER 2+ VW RIGHT, 10/10/2022, 8:15.  INDICATIONS: RIGHT SHOULDER PAIN AND POOR RANGE OF MOTION X 4 MONTHS. HE FELL, PREVIOUS IMAGES ON LINE FROM ORTHO OFFICE. NO CANCER.      TECHNIQUE: A variety of imaging planes and parameters were utilized for visualization of suspected pathology.  Images were performed without contrast.   FINDINGS:  No fracture is demonstrated.  Marrow signal appears normal.  The humeral head is subluxed superiorly 1.2 cm.  Moderate acromioclavicular osteoarthritis is noted.  No AC joint effusion is seen.  A  type 2 acromion is present.  There is a full-thickness tear of the supraspinatus tendon which is retracted 3.9 cm.  There is a full-thickness tear of the infra spinatus tendon with 3.8 cm tendon retraction.  The resulting gap measures approximately 5.6 cm AP.  There is mild supraspinatus and moderate infra spinatus muscle body fatty atrophy.  There is a partial thickness articular surface tear of the subscapularis tendon involving approximately 66% AP diameter thickness.  The teres minor tendon appears unremarkable.  The biceps long head tendon superior to the humeral head appears markedly attenuated.  Its insertion onto the superior labrum is not distinctly seen.  There may be a variant insertion onto the rotator cuff.  No labral tear is identified.   A small to moderate glenohumeral joint effusion is noted.  A 0.4 cm loose body is noted in the anterior joint.  Grade 2-3 chondromalacia is noted along the humeral head.  Grade 4 chondromalacia is noted along the anterior aspect of the glenoid measuring up to 0.9 cm AP.      Impression:   1. Massive rotator cuff tear involving the supraspinatus and infra spinatus tendons, as above.  Fatty atrophy of both muscle bodies 2. Partial thickness articular surface tear of the subscapularis tendon 3. Moderate acromioclavicular and mild-to-moderate glenohumeral osteoarthritis 4. High-grade partial versus complete tear of the biceps long head tendon, as detailed above 5. Small to moderate glenohumeral joint effusion      Aryan Delaney M.D.       Electronically Signed and Approved By: Aryan Delaney M.D. on 11/04/2022 at 9:31                Diagnoses and all orders for this visit:    1. Rotator cuff arthropathy, right (Primary)        Discussed the treatment options with the patient, operative vs non-operative. Discussed the risks and benefits of a Right Reverse Total Shoulder Arthroplasty vs conservative treatment with home exercises, steroid injection, medications, and topicals  as needed. The patient expressed understanding and wished to proceed with conservative treatment. Discussed the risks and benefits of a steroid injection in the right knee. The patient expressed understanding and wished to proceed. He tolerated this well. Plan to continue home exercises, oral medications and topicals as needed.       Educated on risk of smoking. Discussed options for smoking cessation.   Call or return if worsening symptoms.    Scribed for Marshall Kaur MD by Radha Murillo  11/09/2022   08:03 EST         Follow Up       PRN    Patient was given instructions and counseling regarding his condition or for health maintenance advice. Please see specific information pulled into the AVS if appropriate.       I have personally performed the services described in this document as scribed by the above individual and it is both accurate and complete.     Marshall Kaur MD  11/09/22  08:07 EST

## 2022-11-10 RX ORDER — TRIAMCINOLONE ACETONIDE 40 MG/ML
40 INJECTION, SUSPENSION INTRA-ARTICULAR; INTRAMUSCULAR
Status: COMPLETED | OUTPATIENT
Start: 2022-11-09 | End: 2022-11-09

## 2022-11-10 RX ORDER — LIDOCAINE HYDROCHLORIDE 10 MG/ML
5 INJECTION, SOLUTION INFILTRATION; PERINEURAL
Status: COMPLETED | OUTPATIENT
Start: 2022-11-09 | End: 2022-11-09

## 2022-12-15 ENCOUNTER — TELEPHONE (OUTPATIENT)
Dept: NEUROSURGERY | Facility: CLINIC | Age: 62
End: 2022-12-15

## 2022-12-15 DIAGNOSIS — R20.2 NUMBNESS AND TINGLING OF BOTH FEET: ICD-10-CM

## 2022-12-15 DIAGNOSIS — G57.90 NEURITIS OF FOOT, UNSPECIFIED LATERALITY: Primary | ICD-10-CM

## 2022-12-15 DIAGNOSIS — R52 PAIN: ICD-10-CM

## 2022-12-15 DIAGNOSIS — R20.0 NUMBNESS AND TINGLING OF BOTH FEET: ICD-10-CM

## 2022-12-15 NOTE — TELEPHONE ENCOUNTER
PT is having severe right foot nerve pain described as stabbing and a little foot pain in the left. He is requesting a referral to Dr. Foster with pain management. Patient is scheduled to follow up on 01/03/22

## 2022-12-15 NOTE — TELEPHONE ENCOUNTER
Caller: Christiano Barr    Relationship: Self    Best call back number: [phone]    What is the medical concern/diagnosis:  FOOT PAIN; STABBING NERVE PAIN ON RIGHT FOOT, 1/3 AS BAD ON LEFT FOOT.    What specialty or service is being requested: PAIN MANAGEMENT W/ DR. ELMER LEIGH    What is the provider, practice or medical service name:   [facility] [address], [address]  [place] [address]   PH: [phone]   FX: [phone]    Any additional details: PATIENT STATES HE'S 'BEEN THROUGH EVERY OPTION EXCEPT THIS ONE: PODIATRIST/ORTHO/PCP' ; EVERYONE IS SUGGESTING HE SEE DR. ELMER LEGER/ [facility] FOR ONGOING SEVERE FOOT NERVE PAIN. PATIENT DESCRIBES RIGHT FOOT AS A TERRIBLE 'STABBING PAIN, LIKE A KNIFE YANG INNER FOOT TO BIG TOE, MOST NOTICEABLE WHILE DRIVING. LEFT FOOT SIMILAR BUT 1/3 AS BAD.'    PATIENT REQUESTING CALL BACK TO CONFIRM A REFERRAL HAS BEEN GENERATED TO DR. ELMER LEGER/ [facility] OR IF WE NEED FURTHER CLINICAL INFORMATION; BEFORE END OF WEEK. THANK YOU.

## 2022-12-15 NOTE — TELEPHONE ENCOUNTER
I called and spoke with Mr. Barr and advised him I am sending refferral over to Dr. Jorge Foster as Dr. Brewer said it was okay. He voiced understanding.

## 2023-01-09 ENCOUNTER — HOSPITAL ENCOUNTER (OUTPATIENT)
Dept: GENERAL RADIOLOGY | Facility: HOSPITAL | Age: 63
Discharge: HOME | End: 2023-01-09
Admitting: NEUROLOGICAL SURGERY
Payer: COMMERCIAL

## 2023-01-09 ENCOUNTER — OFFICE VISIT (OUTPATIENT)
Dept: NEUROSURGERY | Facility: CLINIC | Age: 63
End: 2023-01-09
Payer: COMMERCIAL

## 2023-01-09 VITALS
TEMPERATURE: 96.9 F | HEIGHT: 67 IN | OXYGEN SATURATION: 98 % | HEART RATE: 64 BPM | WEIGHT: 233 LBS | SYSTOLIC BLOOD PRESSURE: 140 MMHG | BODY MASS INDEX: 36.57 KG/M2 | DIASTOLIC BLOOD PRESSURE: 80 MMHG

## 2023-01-09 DIAGNOSIS — Z98.1 HISTORY OF LUMBAR SPINAL FUSION: ICD-10-CM

## 2023-01-09 DIAGNOSIS — T84.226D DISPLACEMENT OF INTERNAL FIXATION DEVICE OF VERTEBRAE, SUBSEQUENT ENCOUNTER: ICD-10-CM

## 2023-01-09 DIAGNOSIS — Q76.2 CONGENITAL SPONDYLOLYSIS, LUMBOSACRAL REGION: ICD-10-CM

## 2023-01-09 DIAGNOSIS — M79.671 RIGHT FOOT PAIN: Primary | ICD-10-CM

## 2023-01-09 PROBLEM — T84.226A: Status: ACTIVE | Noted: 2023-01-09

## 2023-01-09 PROCEDURE — 99214 OFFICE O/P EST MOD 30 MIN: CPT | Performed by: NEUROLOGICAL SURGERY

## 2023-01-09 PROCEDURE — 72114 X-RAY EXAM L-S SPINE BENDING: CPT

## 2023-01-09 RX ORDER — METHYLPREDNISOLONE 4 MG/1
TABLET ORAL
Qty: 21 TABLET | Refills: 0 | Status: SHIPPED | OUTPATIENT
Start: 2023-01-09

## 2023-01-09 NOTE — PROGRESS NOTES
Subjective   Patient ID: Chrisitano Barr is a 62 y.o. male is here today for 6 month follow-up with B/L foot pain. No new imaging    Today patient reports that he has B/L foot pain along with mild low back pain. Patient states that his foot becomes worse at night    Its been about a year since he had a L4-5 L5-S1 instrumented decompression and fusion.  It was a rough postoperative course for a while particularly with the pain and numbness and tingling in his feet.  We had put him on relatively higher doses of gabapentin and he was taking up to 1200 mg/day in divided doses consisting of 300/900.  He is now working with Dr. Jorge Foster is trying to get him to wean that medicine which is certainly fine with me and he is apparently down to 300/600.  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  He is tender to the touch on the right dorsum of his foot which again does not really suggest a spinal etiology but he says this is the type of pain that he was having after the surgery.  He does not have the left buttock and leg pain.  He does have a left L5-S1 posteriorly migrated cage and that not had to do anything about it because he was not having any significant radicular symptoms.  As long as that is the case we will just continue to watch that cage.  He does not have much back pain and not much buttock pain which was the main source of his discomfort that led to his surgery a year ago.  I urged him to continue working with Dr. Foster.  He is scheduled to have an EMG and nerve conduction study ordered by him.  I will see him in 6 months with x-rays.  I will defer to Dr. Foster about the weaning of the gabapentin.          Back Pain  This is a chronic problem. The problem occurs intermittently. The problem is unchanged. The pain is present in the lumbar spine. The pain radiates to the right foot and left  foot. The pain is severe. The pain is worse during the night. The symptoms are aggravated by standing. Associated symptoms include numbness, tingling and weakness. Pertinent negatives include no bowel incontinence, chest pain or fever. He has tried nothing for the symptoms. The treatment provided no relief.       The following portions of the patient's history were reviewed and updated as appropriate: allergies, current medications, past family history, past medical history, past social history, past surgical history and problem list.    Review of Systems   Constitutional: Negative for chills and fever.   HENT: Negative for congestion.    Cardiovascular: Negative for chest pain.   Gastrointestinal: Negative for bowel incontinence.   Musculoskeletal: Positive for back pain and myalgias. Negative for gait problem.        B/L foot pain   Neurological: Positive for tingling, weakness and numbness.   All other systems reviewed and are negative.          Objective     Vitals:    01/09/23 1012   BP: 140/80   Pulse: 64   Temp: 96.9 °F (36.1 °C)   SpO2: 98%   Weight: 106 kg (233 lb)   Height: 170.2 cm (67.01\")     Body mass index is 36.48 kg/m².    Tobacco Use: Low Risk    • Smoking Tobacco Use: Never   • Smokeless Tobacco Use: Never   • Passive Exposure: Not on file          Physical Exam  Constitutional:       Appearance: He is well-developed.   HENT:      Head: Normocephalic and atraumatic.   Eyes:      Extraocular Movements: EOM normal.      Conjunctiva/sclera: Conjunctivae normal.      Pupils: Pupils are equal, round, and reactive to light.   Neck:      Vascular: No carotid bruit.   Neurological:      Mental Status: He is oriented to person, place, and time.      Coordination: Finger-Nose-Finger Test and Heel to Shin Test normal.      Gait: Gait is intact.      Deep Tendon Reflexes:      Reflex Scores:       Tricep reflexes are 2+ on the right side and 2+ on the left side.       Bicep reflexes are 2+ on the right side  and 2+ on the left side.       Brachioradialis reflexes are 2+ on the right side and 2+ on the left side.       Patellar reflexes are 2+ on the right side and 2+ on the left side.       Achilles reflexes are 2+ on the right side and 2+ on the left side.  Psychiatric:         Speech: Speech normal.       Neurologic Exam     Mental Status   Oriented to person, place, and time.   Registration of memory: Good recent and remote memory.   Attention: normal. Concentration: normal.   Speech: speech is normal   Level of consciousness: alert  Knowledge: consistent with education.     Cranial Nerves     CN II   Visual fields full to confrontation.   Visual acuity: normal    CN III, IV, VI   Pupils are equal, round, and reactive to light.  Extraocular motions are normal.     CN V   Facial sensation intact.   Right corneal reflex: normal  Left corneal reflex: normal    CN VII   Facial expression full, symmetric.   Right facial weakness: none  Left facial weakness: none    CN VIII   Hearing: intact    CN IX, X   Palate: symmetric    CN XI   Right sternocleidomastoid strength: normal  Left sternocleidomastoid strength: normal    CN XII   Tongue: not atrophic  Tongue deviation: none    Motor Exam   Muscle bulk: normal  Right arm tone: normal  Left arm tone: normal  Right leg tone: normal  Left leg tone: normal    Strength   Strength 5/5 except as noted.     Sensory Exam   Light touch normal.     Gait, Coordination, and Reflexes     Gait  Gait: normal    Coordination   Finger to nose coordination: normal  Heel to shin coordination: normal    Reflexes   Right brachioradialis: 2+  Left brachioradialis: 2+  Right biceps: 2+  Left biceps: 2+  Right triceps: 2+  Left triceps: 2+  Right patellar: 2+  Left patellar: 2+  Right achilles: 2+  Left achilles: 2+  Right : 2+  Left : 2+          Assessment & Plan   Independent Review of Radiographic Studies:      I personally reviewed the images from the following studies.    The plain  x-rays done on 1/9/2023 show no change in the posteriorly migrated cage at L5-S1.  The rest of the cages and the pedicle screw fixation from L4-S1 are stable.        Medical Decision Making:      I urged him to continue working with Dr. Foster.  We will try a steroid pack.  I urged him to continue working with Dr. Foster who is now managing his gabapentin.  I will see him in 6 months with another set of x-rays.  If he has severe radiculopathy particularly on the left side, he is to let me know.      Diagnoses and all orders for this visit:    1. Right foot pain (Primary)    2. History of lumbar spinal fusion  -     XR Spine Lumbar Complete With Flex & Ext; Future    3. Displacement of internal fixation device of vertebrae, subsequent encounter  -     XR Spine Lumbar Complete With Flex & Ext; Future    4. Congenital spondylolysis, lumbosacral region      Return in about 6 months (around 7/9/2023) for Face-to-face.

## 2023-03-28 ENCOUNTER — OFFICE VISIT (OUTPATIENT)
Dept: SLEEP MEDICINE | Facility: HOSPITAL | Age: 63
End: 2023-03-28
Payer: COMMERCIAL

## 2023-03-28 VITALS
WEIGHT: 219.7 LBS | HEIGHT: 67 IN | HEART RATE: 66 BPM | SYSTOLIC BLOOD PRESSURE: 167 MMHG | OXYGEN SATURATION: 99 % | BODY MASS INDEX: 34.48 KG/M2 | DIASTOLIC BLOOD PRESSURE: 73 MMHG

## 2023-03-28 DIAGNOSIS — Z99.89 OSA ON CPAP: Primary | ICD-10-CM

## 2023-03-28 DIAGNOSIS — G47.33 OSA ON CPAP: Primary | ICD-10-CM

## 2023-03-28 DIAGNOSIS — E66.09 CLASS 1 OBESITY DUE TO EXCESS CALORIES WITH BODY MASS INDEX (BMI) OF 34.0 TO 34.9 IN ADULT, UNSPECIFIED WHETHER SERIOUS COMORBIDITY PRESENT: ICD-10-CM

## 2023-03-28 PROCEDURE — 1160F RVW MEDS BY RX/DR IN RCRD: CPT | Performed by: NURSE PRACTITIONER

## 2023-03-28 PROCEDURE — 1159F MED LIST DOCD IN RCRD: CPT | Performed by: NURSE PRACTITIONER

## 2023-03-28 PROCEDURE — 3077F SYST BP >= 140 MM HG: CPT | Performed by: NURSE PRACTITIONER

## 2023-03-28 PROCEDURE — 3078F DIAST BP <80 MM HG: CPT | Performed by: NURSE PRACTITIONER

## 2023-03-28 PROCEDURE — 99204 OFFICE O/P NEW MOD 45 MIN: CPT | Performed by: NURSE PRACTITIONER

## 2023-03-28 PROCEDURE — G0463 HOSPITAL OUTPT CLINIC VISIT: HCPCS

## 2023-03-28 NOTE — PROGRESS NOTES
[facility]  [address] [place] [address]  [place]   [place] [address]  Phone: [phone]  Fax: [phone]        Christiano Barr  [id number]   1960  62 y.o.  male      Referring Provider and PCP: Daniel Johnson MD    Type of service: Initial Sleep Medicine Consult.  Date of service: 3/28/2023      CHIEF COMPLAINT: Obstructive sleep apnea on CPAP      HISTORY OF PRESENT ILLNESS:  Thank you for asking us to see Christiano Barr.  Christiano Barr 62 y.o. was seen today on 3/28/2023 at [facility].  Patient presents today to establish care for obstructive sleep apnea.  His previous sleep medicine provider in [place] is no longer accepting his insurance.  He received his new DreamStation CPAP 1/2022 after his previous one was recalled.  It looks like this was originally set with minimum pressure 6, maximum 18, a flex of 2, ramp pressure starting at 4 cm H2O.  He reports that when he was hospitalized in the past he felt that the starting pressure was too low and somebody trying to help him adjust this.  Somehow it looks like his machine got changed to a set pressure of 6 cm H2O.  Patient feels that the 6 cm H2O is too low.  He has been having some issues falling asleep recently since he stopped his Cymbalta.  He feels that his anxiety is playing a role here.  He has a follow-up with his primary care provider tomorrow to discuss alternate treatment options for anxiety.      SLEEP HISTORY:  Sleep schedule:  Bedtime: 10 to 11 PM  Wake time: 7 to 8 AM  Average hours of sleep: 9  Number of naps per day: 0      Medical Conditions (PMH):   1. Anxiety  2. Hypertension  3. Acid reflux  4. Arthritis  5. Chronic pain  6. Neuritis    Social history:  Do you drive a commercial vehicle:  No   Shift work:  No   Tobacco use:  No  Alcohol use: Has increased beer intake since he stopped his Cymbalta.  He will discuss further with PCP tomorrow.  Caffeinated drinks: 3 per  "day  Occupation: [employer]    Family History (parents and siblings) (pertaining to sleep medicine):  1. Parkinson's disease    Medications: reviewed    Allergies:  Patient has no known allergies.      REVIEW OF SYSTEMS:  Pertinent positive symptoms are:  • Daytime excessive sleepiness with [place] Sleepiness Scale of Total score: 8   • Anxiety  • Heartburn        PHYSICAL EXAM:  CONSTITUTINONAL:   Vitals:    03/28/23 0900   BP: 167/73   Pulse: 66   SpO2: 99%   Weight: 99.7 kg (219 lb 11.2 oz)   Height: 170.2 cm (67.01\")    Body mass index is 34.4 kg/m².   HEAD: atraumatic, normocephalic   NECK: Neck Circumference: 17 inches, trachea is midline  RESPIRATORY SYSTEM: Breathing even and unlabored, normal rate  CARDIOVASULAR SYSTEM: Regular rate, no edema  NEUROLOGICAL SYSTEM: Alert and oriented x 3, normal gait  PSYCHIATRIC SYSTEM: Mood is normal/ appropriate     Office notes from care team reviewed. Office note dated 1/9/2023, reviewed.          ASSESSMENT AND PLAN:   Obstructive Sleep Apnea (G 47.33): sleep apnea symptoms have improved with the device and treatment.  Patient has excellent compliance with the device for treatment of sleep apnea.  I have personally reviewed the smart card download and discussed the download data with the patient and encouarged continued use of the device.  The residual AHI is acceptable. The device is benefiting the patient and the device is medically necessary.  Without adequate treatment of sleep apnea and without good compliance, patient would be at increased risk of hypertension, diabetes mellitus, pulmonary hypertension, atrial fibrillation, stroke, and nonrestorative sleep with hypersomnia which could increase risk of motor vehicle accidents. The patient is also instructed to get the supplies from the [employer] and and change them on a regular basis.  A prescription for supplies has been sent to the [employer].  I have also discussed with this patient the importance of good sleep " hygiene and getting an adequate amount of sleep to aid in overall good health.  We will also send prescription to change CPAP back to auto setting and increase minimum pressure to 8 cm H2O as patient feels 6 is too low.  He will follow-up in 30 days or call sooner for issues or concerns.  · Obesity: Body mass index is 34.4 kg/m².. Patients who are overweight or obese are at increased risk of sleep apnea/ sleep disordered breathing. Weight reduction and healthy lifestyle are encouraged in overweight/ obese patients as part of a comprehensive approach to sleep apnea treatment.    · History of lumbar spine fusion  · Neuritis of foot  · Anxiety: He stopped his Cymbalta on his own.  He has appointment PCP tomorrow to discuss alternate treatment options.  · Hypertension: Seeing PCP tomorrow.  Asymptomatic.  Had increased stress this morning.    Patient will follow-up in 30 days with his SIM card or contact the office sooner for questions or concerns. Patient's questions were answered.            Thank you again for asking me to consult on this patient.  Please do not hesitate to call me if you have additional questions or concerns.       Isamar Mack DNP, APRN  [facility]

## 2023-05-01 ENCOUNTER — OFFICE VISIT (OUTPATIENT)
Dept: SLEEP MEDICINE | Facility: HOSPITAL | Age: 63
End: 2023-05-01
Payer: COMMERCIAL

## 2023-05-01 VITALS
HEART RATE: 89 BPM | HEIGHT: 67 IN | OXYGEN SATURATION: 98 % | BODY MASS INDEX: 34.14 KG/M2 | DIASTOLIC BLOOD PRESSURE: 87 MMHG | WEIGHT: 217.5 LBS | SYSTOLIC BLOOD PRESSURE: 140 MMHG

## 2023-05-01 DIAGNOSIS — E66.9 OBESITY (BMI 30-39.9): ICD-10-CM

## 2023-05-01 DIAGNOSIS — Z99.89 OSA ON CPAP: Primary | ICD-10-CM

## 2023-05-01 DIAGNOSIS — G47.33 OSA ON CPAP: Primary | ICD-10-CM

## 2023-05-01 PROCEDURE — G0463 HOSPITAL OUTPT CLINIC VISIT: HCPCS

## 2023-05-01 NOTE — PROGRESS NOTES
"  [address] [address]  Phone: [phone]  Fax: [phone]        SLEEP CLINIC FOLLOW-UP PROGRESS NOTE    Christiano Barr  [id number]   1960  62 y.o.  male      PCP: Daniel Johnson MD    DATE OF VISIT: 5/1/2023        CHIEF COMPLAINT: Obstructive sleep apnea on CPAP    HPI:  This is a 62 y.o. year old patient who presents to the clinic today for the management of obstructive sleep apnea.  Patient received new DreamStation CPAP 1/2022 after previous 1 was recalled.  He found that the pressure was starting to loneliness was increased to 8 last visit.  Patient reports it did seem like he was starting at 8 for a while but then for some reason not has reverted back to 6.  He still prefers to start pressure of 8.  We will resend this to his card today.  He will call us if this does not go through.  He is tolerating the CPAP well.  His mean pressure is 8.5, it is 11.5 cm H2O or less 90% of the time.  He does report his legs move at times when he is sleeping.  He is not bothered by it wife reports this to him at times.  He has significant intake of 25 alcoholic beverages per week.  Recommended cutting this back and notify office if symptoms fail to improve.  He feels his residual daytime fatigue is from \"staying lazy\" and not significant.      MEDICATIONS: reviewed     ALLERGIES:  Doxycycline    SOCIAL HISTORY (habits pertaining to sleep medicine):  • History tobacco use: No   • History of alcohol use: 25 per week--recommended slowly cutting this back  • Caffeine use: 3     REVIEW OF SYSTEMS:   Pertinent positive symptoms are:  • [place] Sleepiness Scale :Total score: 11   • Nasal congestion--- OTC Flonase okay as needed  • Heartburn  • Depression--- to discuss further with PCP      PHYSICAL EXAMINATION:  CONSTITUTIONAL:  Vitals:    05/01/23 1100   BP: 140/87   Pulse: 89   SpO2: 98%   Weight: 98.7 kg (217 lb 8 oz)   Height: 170.2 cm (67.01\")    " Body mass index is 34.06 kg/m².   HEAD: atraumatic, normocephalic  RESP SYSTEM: not in respiratory distress, breathing unlabored  CARDIOVASULAR: normal rate, no edema noted   NEURO: Alert and oriented x 3, mood and affect appeared appropriate      DATA REVIEWED:  The Smart card downloaded on 5/1/2023 has been reviewed independently by me for compliance and discussed the data with the patient.   Compliance: 100%  More than 4 hr use: 100%  Average use of the device: 9 hours 3 minutes per night  Residual AHI: 1.7 /hr (goal < 5.0 /hr)  Device: DreamStation auto CPAP--- received new device after recall  DME: [employer]        ASSESSMENT AND PLAN:  · Obstructive Sleep Apnea (G 47.33): sleep apnea symptoms have improved with the device and treatment.  Patient has excellent compliance with the device for treatment of sleep apnea.  I have personally reviewed the smart card download and discussed the download data with the patient and encouarged continued use of the device.  The residual AHI is acceptable. The device is benefiting the patient and the device is medically necessary.  Without adequate treatment of sleep apnea and without good compliance, patient would be at increased risk of hypertension, diabetes mellitus, pulmonary hypertension, atrial fibrillation, stroke, and nonrestorative sleep with hypersomnia which could increase risk of motor vehicle accidents. The patient is also instructed to get the supplies from the [employer] and and change them on a regular basis.  A prescription for supplies has been sent to the [employer] company.  I have also discussed with this patient the importance of good sleep hygiene and getting an adequate amount of sleep to aid in overall good health.   · Obesity:  Body mass index is 34.06 kg/m².. Patients who are overweight or obese are at increased risk of sleep apnea/ sleep disordered breathing. Weight reduction and healthy lifestyle are encouraged in overweight/ obese patients as  part of a comprehensive approach to sleep apnea treatment.    · History of lumbar spine fusion: Follows with neurosurgery  · Neuritis of foot: Follows with neurosurgery.  Did not tolerate gabapentin at times he was on  · Anxiety and depression: To discuss further with PCP  · Hypertension: PCP managing  · History of nocturnal limb movements: Significant alcohol use recently.  Recommended cutting back on this and notify office if symptoms fail to improve.  · Alcohol use: Recommend slowly cutting back and follow-up with PCP if he has an issue with this.      Patient will follow-up in 1 year, per patient preference (declines sooner follow-up) or follow-up sooner for any issues or concerns.  Patient's questions were answered.          Thank you for allowing me to participate in the care of this patient.     Isamar Mack DNP, APRN  [facility]

## 2023-12-26 ENCOUNTER — HOSPITAL ENCOUNTER (EMERGENCY)
Facility: HOSPITAL | Age: 63
Discharge: HOME | End: 2023-12-26
Admitting: EMERGENCY MEDICINE
Payer: COMMERCIAL

## 2023-12-26 ENCOUNTER — APPOINTMENT (OUTPATIENT)
Dept: GENERAL RADIOLOGY | Facility: HOSPITAL | Age: 63
End: 2023-12-26
Payer: COMMERCIAL

## 2023-12-26 VITALS
RESPIRATION RATE: 18 BRPM | TEMPERATURE: 98.4 F | WEIGHT: 229.28 LBS | HEIGHT: 67 IN | DIASTOLIC BLOOD PRESSURE: 91 MMHG | SYSTOLIC BLOOD PRESSURE: 169 MMHG | OXYGEN SATURATION: 98 % | BODY MASS INDEX: 35.99 KG/M2 | HEART RATE: 75 BPM

## 2023-12-26 DIAGNOSIS — Z53.21 ELOPED FROM EMERGENCY DEPARTMENT: Primary | ICD-10-CM

## 2023-12-26 LAB
ALBUMIN SERPL-MCNC: 4.3 G/DL (ref 3.5–5.2)
ALBUMIN/GLOB SERPL: 1.5 G/DL
ALP SERPL-CCNC: 65 U/L (ref 39–117)
ALT SERPL W P-5'-P-CCNC: 27 U/L (ref 1–41)
ANION GAP SERPL CALCULATED.3IONS-SCNC: 14.4 MMOL/L (ref 5–15)
AST SERPL-CCNC: 25 U/L (ref 1–40)
BASOPHILS # BLD AUTO: 0.05 10*3/MM3 (ref 0–0.2)
BASOPHILS NFR BLD AUTO: 0.6 % (ref 0–1.5)
BILIRUB SERPL-MCNC: 0.5 MG/DL (ref 0–1.2)
BUN SERPL-MCNC: 20 MG/DL (ref 8–23)
BUN/CREAT SERPL: 20 (ref 7–25)
CALCIUM SPEC-SCNC: 9.4 MG/DL (ref 8.6–10.5)
CHLORIDE SERPL-SCNC: 103 MMOL/L (ref 98–107)
CO2 SERPL-SCNC: 20.6 MMOL/L (ref 22–29)
CREAT SERPL-MCNC: 1 MG/DL (ref 0.76–1.27)
DEPRECATED RDW RBC AUTO: 42.1 FL (ref 37–54)
EGFRCR SERPLBLD CKD-EPI 2021: 84.6 ML/MIN/1.73
EOSINOPHIL # BLD AUTO: 0.12 10*3/MM3 (ref 0–0.4)
EOSINOPHIL NFR BLD AUTO: 1.5 % (ref 0.3–6.2)
ERYTHROCYTE [DISTWIDTH] IN BLOOD BY AUTOMATED COUNT: 12.5 % (ref 12.3–15.4)
GLOBULIN UR ELPH-MCNC: 2.8 GM/DL
GLUCOSE SERPL-MCNC: 107 MG/DL (ref 65–99)
HCT VFR BLD AUTO: 38.3 % (ref 37.5–51)
HGB BLD-MCNC: 13.5 G/DL (ref 13–17.7)
HOLD SPECIMEN: NORMAL
HOLD SPECIMEN: NORMAL
IMM GRANULOCYTES # BLD AUTO: 0.07 10*3/MM3 (ref 0–0.05)
IMM GRANULOCYTES NFR BLD AUTO: 0.9 % (ref 0–0.5)
LYMPHOCYTES # BLD AUTO: 1.29 10*3/MM3 (ref 0.7–3.1)
LYMPHOCYTES NFR BLD AUTO: 15.8 % (ref 19.6–45.3)
MCH RBC QN AUTO: 32.7 PG (ref 26.6–33)
MCHC RBC AUTO-ENTMCNC: 35.2 G/DL (ref 31.5–35.7)
MCV RBC AUTO: 92.7 FL (ref 79–97)
MONOCYTES # BLD AUTO: 0.5 10*3/MM3 (ref 0.1–0.9)
MONOCYTES NFR BLD AUTO: 6.1 % (ref 5–12)
NEUTROPHILS NFR BLD AUTO: 6.15 10*3/MM3 (ref 1.7–7)
NEUTROPHILS NFR BLD AUTO: 75.1 % (ref 42.7–76)
NRBC BLD AUTO-RTO: 0 /100 WBC (ref 0–0.2)
NT-PROBNP SERPL-MCNC: 151.6 PG/ML (ref 0–900)
PLATELET # BLD AUTO: 193 10*3/MM3 (ref 140–450)
PMV BLD AUTO: 8.9 FL (ref 6–12)
POTASSIUM SERPL-SCNC: 3.9 MMOL/L (ref 3.5–5.2)
PROT SERPL-MCNC: 7.1 G/DL (ref 6–8.5)
RBC # BLD AUTO: 4.13 10*6/MM3 (ref 4.14–5.8)
SODIUM SERPL-SCNC: 138 MMOL/L (ref 136–145)
TROPONIN T SERPL HS-MCNC: 14 NG/L
WBC NRBC COR # BLD AUTO: 8.18 10*3/MM3 (ref 3.4–10.8)
WHOLE BLOOD HOLD COAG: NORMAL
WHOLE BLOOD HOLD SPECIMEN: NORMAL

## 2023-12-26 PROCEDURE — 99283 EMERGENCY DEPT VISIT LOW MDM: CPT

## 2023-12-26 PROCEDURE — 93005 ELECTROCARDIOGRAM TRACING: CPT

## 2023-12-26 PROCEDURE — 80053 COMPREHEN METABOLIC PANEL: CPT

## 2023-12-26 PROCEDURE — 85025 COMPLETE CBC W/AUTO DIFF WBC: CPT

## 2023-12-26 PROCEDURE — 36415 COLL VENOUS BLD VENIPUNCTURE: CPT

## 2023-12-26 PROCEDURE — 84484 ASSAY OF TROPONIN QUANT: CPT

## 2023-12-26 PROCEDURE — 83880 ASSAY OF NATRIURETIC PEPTIDE: CPT

## 2023-12-26 RX ORDER — SODIUM CHLORIDE 0.9 % (FLUSH) 0.9 %
10 SYRINGE (ML) INJECTION AS NEEDED
Status: DISCONTINUED | OUTPATIENT
Start: 2023-12-26 | End: 2023-12-26 | Stop reason: HOSPADM

## 2023-12-26 NOTE — ED PROVIDER NOTES
Time: 3:58 PM EST  Date of encounter:  12/26/2023  Independent Historian/Clinical History and Information was obtained by:   Patient    History is limited by: N/A    Chief Complaint   Patient presents with    Foot Swelling    Hypertension         History of Present Illness:  Patient is a 63 y.o. year old male who presents to the emergency department for evaluation of high blood pressure and bilateral lower extremity edema, bilateral foot pain for 5 days.  Patient reports 2 weeks ago he abruptly stopped taking his Cymbalta due to side effects.  Patient reports shortness of air when he lies down at night.  Patient reports he was sent to the ED from urgent care with concern for heart failure.  Patient denies chest pain.  (HERO Phillips, provider in triage)     Patient Care Team  Primary Care Provider: Daniel Johnson MD    Past Medical History:     Allergies   Allergen Reactions    Doxycycline Photosensitivity     Past Medical History:   Diagnosis Date    Acid reflux     Anxiety     Arthritis     OSTEO    Bradycardia     40's-50's     Congenital spondylolysis, lumbosacral region     Hypertension     Low back pain     Peripheral neuropathy     Pre-diabetes     Risk factors for obstructive sleep apnea     YOAN 7    Sleep apnea     instructed to bring cpap for surgery     Spinal stenosis of lumbar region with radiculopathy      Past Surgical History:   Procedure Laterality Date    APPENDECTOMY      INGUINAL HERNIA REPAIR Right     LUMBAR FUSION N/A 1/6/2022    Procedure: Open lumbar decompression with posterior lumbar interbody fusion with cages and pedicle scew/susan fixation lumbar four/five, lumbar five/sacral one using the [employer] robot;  Surgeon: Luis Brewer MD;  Location: [facility];  Service: Robotics - Neuro;  Laterality: N/A;    TOTAL KNEE ARTHROPLASTY Left 1/18/2018    Procedure: LT TOTAL KNEE ARTHROPLASTY;  Surgeon: Ralph Panchal MD;  Location: [facility] OR;  Service:      Family History    Problem Relation Age of Onset    Malig Hyperthermia Neg Hx        Home Medications:  Prior to Admission medications    Medication Sig Start Date End Date Taking? Authorizing Provider   cyclobenzaprine (FLEXERIL) 10 MG tablet Take 1 tablet by mouth 3 (Three) Times a Day As Needed for Muscle Spasms. 4/4/22   Luis Brewer MD   gabapentin (NEURONTIN) 300 MG capsule Take 1 capsule in the morning, 3 capsules in the evening 7/27/22   Luis Brewer MD   Glucosamine-Chondroit-Vit C-Mn (GLUCOSAMINE 1500 COMPLEX PO) Take  by mouth. Unknown dosage, OTC, takes 2 tabs daily    Sharmaine Feldman MD   hydroCHLOROthiazide (HYDRODIURIL) 12.5 MG tablet Take 1 tablet by mouth Daily. 5/18/22   Sharmaine Feldman MD   losartan-hydrochlorothiazide (HYZAAR) 100-25 MG per tablet Take 1 tablet by mouth Daily. 11/14/21   Sharmaine Feldman MD   meloxicam (MOBIC) 15 MG tablet Take 1 tablet by mouth Daily. 5/13/22   Sharmaine Feldman MD   metoprolol succinate XL (TOPROL-XL) 25 MG 24 hr tablet Take 1 tablet by mouth Daily. 12/28/21   Jose Holloway DO   montelukast (SINGULAIR) 10 MG tablet Take 1 tablet by mouth Daily. 8/21/21   Sharmaine Feldman MD   ondansetron (ZOFRAN) 8 MG tablet Take 1 tablet by mouth Every 8 (Eight) Hours As Needed. 12/17/21   Sharmaine Feldman MD   pantoprazole (PROTONIX) 40 MG EC tablet Take 1 tablet by mouth Daily. 11/14/21   Sharmaine Feldman MD   pravastatin (PRAVACHOL) 20 MG tablet Take 1 tablet by mouth Daily. 11/14/21   Sharmaine Feldman MD   pregabalin (Lyrica) 25 MG capsule Take 1 capsule by mouth 2 (Two) Times a Day. 2/25/22   Luis Brewer MD   sennosides-docusate (PERICOLACE) 8.6-50 MG per tablet Take 2 tablets by mouth 2 (Two) Times a Day. 12/27/21   Jose Holloway DO   sertraline (ZOLOFT) 50 MG tablet Take 1 tablet by mouth Daily. 4/23/23   Sharmaine Feldman MD   tamsulosin (FLOMAX) 0.4 MG capsule 24 hr capsule Take 1 capsule by mouth Daily.  "1/11/22   Ludwin Alaina GREEN HERO   ciprofloxacin (CIPRO) 500 MG tablet Take 1 tablet by mouth 2 (Two) Times a Day. 6/14/22 12/26/23  Provider, MD Sharmaine   methylPREDNISolone (MEDROL) 4 MG dose pack Take as directed on package instructions. 1/9/23 12/26/23  Luis Brewer MD   traMADol (ULTRAM) 50 MG tablet Take 1 tablet by mouth Every 8 (Eight) Hours As Needed for Moderate Pain . 1/18/22 12/26/23  Luis Brewer MD        Social History:   Social History     Tobacco Use    Smoking status: Never    Smokeless tobacco: Never   Vaping Use    Vaping Use: Never used   Substance Use Topics    Alcohol use: Yes     Alcohol/week: 8.0 standard drinks of alcohol     Types: 8 Cans of beer per week     Comment: 3-4 beers a day     Drug use: No         Review of Systems:  Review of Systems   Respiratory:  Positive for shortness of breath.    Cardiovascular:  Positive for leg swelling. Negative for chest pain.        Physical Exam:  /91 (BP Location: Left arm, Patient Position: Sitting)   Pulse 75   Temp 98.4 °F (36.9 °C) (Oral)   Resp 18   Ht 170.2 cm (67\")   Wt 104 kg (229 lb 4.5 oz)   SpO2 98%   BMI 35.91 kg/m²         Physical Exam  Constitutional:       Appearance: Normal appearance.   HENT:      Head: Normocephalic.   Eyes:      Extraocular Movements: Extraocular movements intact.      Conjunctiva/sclera: Conjunctivae normal.   Pulmonary:      Effort: Pulmonary effort is normal.   Abdominal:      General: There is no distension.   Musculoskeletal:      Right lower leg: Edema present.      Left lower leg: Edema present.   Skin:     General: Skin is warm.      Coloration: Skin is not cyanotic.   Neurological:      Mental Status: He is alert and oriented to person, place, and time.   Psychiatric:         Attention and Perception: Attention and perception normal.         Mood and Affect: Mood normal.                Procedures:  Procedures      Medical Decision Making:      Comorbidities that affect " care:    Diabetes, Hypertension    External Notes reviewed:          The following orders were placed and all results were independently analyzed by me:  Orders Placed This Encounter   Procedures    [place] Draw    Comprehensive Metabolic Panel    BNP    Single High Sensitivity Troponin T    CBC Auto Differential    ECG 12 Lead ED Triage Standing Order; SOA    CBC & Differential    Green Top (Gel)    Lavender Top    Gold Top - SST    Light Blue Top       Medications Given in the Emergency Department:  Medications - No data to display       ED Course:    The patient was initially evaluated in the triage area where orders were placed. The patient was later dispositioned by HERO Phillips.      The patient was advised to stay for completion of workup which includes but is not limited to communication of labs and radiological results, reassessment and plan. The patient was advised that leaving prior to disposition by a provider could result in critical findings that are not communicated to the patient.          Labs:    Lab Results (last 24 hours)       ** No results found for the last 24 hours. **             Imaging:    No Radiology Exams Resulted Within Past 24 Hours      Differential Diagnosis and Discussion:      Edema: Based on the patient's history, signs, and symptoms, the differential diagnosis includes but is not limited to heart failure, kidney disease, liver disease, venous insufficiency, lymphedema, pregnancy, medications, allergic reactions.        MDM               Patient Care Considerations:          Consultants/Shared Management Plan:        Social Determinants of Health:    Patient is independent, reliable, and has access to care.       Disposition and Care Coordination:    Eloped: This patient has left the emergency department or waiting room with no communication to myself, nursing or administrative staff. There was no opportunity to discuss the patient's decision to leave, provide medical  advice or discuss alternatives to. The staff has made efforts to locate patient without success.        Final diagnoses:   Eloped from emergency department        ED Disposition       ED Disposition   Eloped    Condition   --    Comment   --               This medical record created using voice recognition software.             Lexi Bello, APRN  12/31/23 3616

## 2023-12-29 LAB
QT INTERVAL: 369 MS
QTC INTERVAL: 393 MS

## 2024-01-02 NOTE — PROGRESS NOTES
"Subjective   Patient ID: Christiano Barr is a 63 y.o. male is here today for follow-up.    Is been 2 years since I did an L4-L5 and L5-S1 instrumented fusion.  Overall he is done well although one of his cages at L5-S1 migrated posteriorly on the left into the canal.  But surprisingly he is never really had sciatica on the left side which is what I would expect if the migrated cage was symptomatic.  He still has his numb feet which apparently the injections with the podiatrist seem to help with.  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  He has no significant sciatica.  Manageable low back pain.  He has no motor deficits.  The x-rays that we got did not show any change in the degree of migration of the left cage.  Will leave things alone and I will see him in 1 year with another set of x-rays.  At that migrated cage become symptomatic then he needs to have them extracted anteriorly and undergo an ALIF at L5-S1.  Right now we can avoid that.    History of Present Illness    The following portions of the patient's history were reviewed and updated as appropriate: allergies, current medications, past family history, past medical history, past social history, past surgical history, and problem list.    Review of Systems   Constitutional:  Negative for fever.   Musculoskeletal:  Negative for back pain.   Neurological:  Positive for numbness (feet). Negative for weakness.   All other systems reviewed and are negative.          Objective     Vitals:    01/03/24 1145   BP: 172/70   BP Location: Left arm   Patient Position: Sitting   Cuff Size: Adult   Pulse: 69   Resp: 20   SpO2: 96%   Weight: 104 kg (229 lb)   Height: 170.2 cm (67.01\")   PainSc: 0-No pain     Body mass index is 35.86 kg/m².    Tobacco Use: Low Risk  (1/3/2024)    Patient History     Smoking Tobacco Use: Never     Smokeless Tobacco Use: Never     Passive Exposure: Not on file          Physical Exam  Constitutional:       Appearance: He is " well-developed.   HENT:      Head: Normocephalic and atraumatic.   Eyes:      Extraocular Movements: EOM normal.      Conjunctiva/sclera: Conjunctivae normal.      Pupils: Pupils are equal, round, and reactive to light.   Neck:      Vascular: No carotid bruit.   Neurological:      Mental Status: He is oriented to person, place, and time.      Coordination: Finger-Nose-Finger Test and Heel to Shin Test normal.      Gait: Gait is intact.      Deep Tendon Reflexes:      Reflex Scores:       Tricep reflexes are 2+ on the right side and 2+ on the left side.       Bicep reflexes are 2+ on the right side and 2+ on the left side.       Brachioradialis reflexes are 2+ on the right side and 2+ on the left side.       Patellar reflexes are 2+ on the right side and 2+ on the left side.       Achilles reflexes are 2+ on the right side and 2+ on the left side.  Psychiatric:         Speech: Speech normal.       Neurologic Exam     Mental Status   Oriented to person, place, and time.   Registration of memory: Good recent and remote memory.   Attention: normal. Concentration: normal.   Speech: speech is normal   Level of consciousness: alert  Knowledge: consistent with education.     Cranial Nerves     CN II   Visual fields full to confrontation.   Visual acuity: normal    CN III, IV, VI   Pupils are equal, round, and reactive to light.  Extraocular motions are normal.     CN V   Facial sensation intact.   Right corneal reflex: normal  Left corneal reflex: normal    CN VII   Facial expression full, symmetric.   Right facial weakness: none  Left facial weakness: none    CN VIII   Hearing: intact    CN IX, X   Palate: symmetric    CN XI   Right sternocleidomastoid strength: normal  Left sternocleidomastoid strength: normal    CN XII   Tongue: not atrophic  Tongue deviation: none    Motor Exam   Muscle bulk: normal  Right arm tone: normal  Left arm tone: normal  Right leg tone: normal  Left leg tone: normal    Strength   Strength 5/5  except as noted.     Sensory Exam   Light touch normal.     Gait, Coordination, and Reflexes     Gait  Gait: normal    Coordination   Finger to nose coordination: normal  Heel to shin coordination: normal    Reflexes   Right brachioradialis: 2+  Left brachioradialis: 2+  Right biceps: 2+  Left biceps: 2+  Right triceps: 2+  Left triceps: 2+  Right patellar: 2+  Left patellar: 2+  Right achilles: 2+  Left achilles: 2+  Right : 2+  Left : 2+          Assessment & Plan   Independent Review of Radiographic Studies:      I personally reviewed the images from the following studies.    The x-rays today do show no change in the migrated left interbody cage into the canal.  Agree with the report.        Medical Decision Making:      Clinically he is doing well despite the cage migration issue.  I will see him in 1 year with x-rays.  If he develops some left-sided sciatica or even a right-sided sciatica for that matter, he will let me know.        Diagnoses and all orders for this visit:    1. Right foot pain (Primary)    2. History of lumbar spinal fusion  -     XR Spine Lumbar Complete With Flex & Ext; Future    3. Displacement of internal fixation device of vertebrae, subsequent encounter  -     XR Spine Lumbar Complete With Flex & Ext; Future      Return in about 1 year (around 1/3/2025) for Face-to-face.

## 2024-01-03 ENCOUNTER — HOSPITAL ENCOUNTER (OUTPATIENT)
Dept: GENERAL RADIOLOGY | Facility: HOSPITAL | Age: 64
Discharge: HOME | End: 2024-01-03
Admitting: NEUROLOGICAL SURGERY
Payer: COMMERCIAL

## 2024-01-03 ENCOUNTER — OFFICE VISIT (OUTPATIENT)
Dept: NEUROSURGERY | Facility: CLINIC | Age: 64
End: 2024-01-03
Payer: COMMERCIAL

## 2024-01-03 VITALS
RESPIRATION RATE: 20 BRPM | SYSTOLIC BLOOD PRESSURE: 172 MMHG | HEIGHT: 67 IN | DIASTOLIC BLOOD PRESSURE: 70 MMHG | BODY MASS INDEX: 35.94 KG/M2 | OXYGEN SATURATION: 96 % | WEIGHT: 229 LBS | HEART RATE: 69 BPM

## 2024-01-03 DIAGNOSIS — T84.226D DISPLACEMENT OF INTERNAL FIXATION DEVICE OF VERTEBRAE, SUBSEQUENT ENCOUNTER: ICD-10-CM

## 2024-01-03 DIAGNOSIS — M79.671 RIGHT FOOT PAIN: Primary | ICD-10-CM

## 2024-01-03 DIAGNOSIS — Z98.1 HISTORY OF LUMBAR SPINAL FUSION: ICD-10-CM

## 2024-01-03 PROCEDURE — 72114 X-RAY EXAM L-S SPINE BENDING: CPT

## 2024-06-10 ENCOUNTER — OFFICE VISIT (OUTPATIENT)
Dept: SLEEP MEDICINE | Facility: HOSPITAL | Age: 64
End: 2024-06-10
Payer: COMMERCIAL

## 2024-06-10 VITALS
OXYGEN SATURATION: 96 % | SYSTOLIC BLOOD PRESSURE: 152 MMHG | HEART RATE: 62 BPM | DIASTOLIC BLOOD PRESSURE: 63 MMHG | BODY MASS INDEX: 35.14 KG/M2 | WEIGHT: 223.9 LBS | HEIGHT: 67 IN

## 2024-06-10 DIAGNOSIS — E66.01 CLASS 2 SEVERE OBESITY WITH SERIOUS COMORBIDITY AND BODY MASS INDEX (BMI) OF 35.0 TO 35.9 IN ADULT, UNSPECIFIED OBESITY TYPE: ICD-10-CM

## 2024-06-10 DIAGNOSIS — G47.33 MODERATE OBSTRUCTIVE SLEEP APNEA: Primary | ICD-10-CM

## 2024-06-10 PROCEDURE — 3078F DIAST BP <80 MM HG: CPT | Performed by: NURSE PRACTITIONER

## 2024-06-10 PROCEDURE — G0463 HOSPITAL OUTPT CLINIC VISIT: HCPCS

## 2024-06-10 PROCEDURE — 1160F RVW MEDS BY RX/DR IN RCRD: CPT | Performed by: NURSE PRACTITIONER

## 2024-06-10 PROCEDURE — 1159F MED LIST DOCD IN RCRD: CPT | Performed by: NURSE PRACTITIONER

## 2024-06-10 PROCEDURE — 3077F SYST BP >= 140 MM HG: CPT | Performed by: NURSE PRACTITIONER

## 2024-06-10 PROCEDURE — 99214 OFFICE O/P EST MOD 30 MIN: CPT | Performed by: NURSE PRACTITIONER

## 2024-06-10 RX ORDER — AMLODIPINE BESYLATE 2.5 MG/1
1 TABLET ORAL DAILY
COMMUNITY
Start: 2024-05-29

## 2024-06-10 NOTE — PROGRESS NOTES
"  [address] [address]  Phone: [phone]  Fax: [phone]        SLEEP CLINIC FOLLOW-UP PROGRESS NOTE    Christiano Barr  [id number]   1960  63 y.o.  male      PCP: Daniel Johnson MD    DATE OF VISIT: 6/10/2024          CHIEF COMPLAINT: Obstructive sleep apnea    HPI:  This is a 63 y.o. year old patient who presents to the clinic today for the management of obstructive sleep apnea.  Patient had a(n) side sleep study 4/2018 showing moderate obstructive sleep apnea with AHI of 21.5/hr. uzair SpO2 was 85%.  Weight was 96.27 kg at that time.  This patient is using positive airway pressure therapy with auto CPAP.   He did receive replacement DreamStation device after the recall.  Patient's sleep apnea has improved with this therapy, residual AHI 0.4/h..  Getting about 8 hours and 40 minutes of PAP use per day.  No significant mask leak.  He does feel that the PAP pressures are little too low at times, has to open mouth so we can get a good breath in.  Will increase minimum pressure from 8 to 9 cmH2O. he reports chronic dyspnea for the last couple of years, no new or worsening symptoms.  Did recommend that he address further with PCP and he verbalized understanding of importance.          MEDICATIONS: reviewed     ALLERGIES:  Doxycycline    SOCIAL HISTORY (habits pertaining to sleep medicine):  Tobacco use: No   Alcohol use: 10 per week  Caffeine use: 3     REVIEW OF SYSTEMS:   Pertinent positive symptoms are:  Glidden Sleepiness Scale :Total score: 10   Nasal congestion  Dry mouth  Cough  Dyspnea  Heartburn        PHYSICAL EXAMINATION:  CONSTITUTIONAL:  Vitals:    06/10/24 0700   BP: 152/63   Pulse: 62   SpO2: 96%   Weight: 102 kg (223 lb 14.4 oz)   Height: 170.2 cm (67.01\")    Body mass index is 35.06 kg/m².   HEAD: atraumatic, normocephalic  RESP SYSTEM: not in respiratory distress, breathing unlabored  CARDIOVASULAR: normal rate, no edema " noted   NEURO: Alert and oriented x 3, mood and affect appeared appropriate      DATA REVIEWED:  The PAP compliance summary downloaded on 6/9/2024 has been reviewed independently by me and discussed with the patient.   Compliance: 100%  More than 4 hr use: 100%  Average use of the device: 8 hours 40 minutes per night  Residual AHI: 0.4 /hr (goal < 5.0 /hr)  Device: DreamStation auto CPAP  Mask type: Nasal pillow  DME: [employer]          ASSESSMENT AND PLAN:  Obstructive Sleep Apnea: sleep apnea has improved with the device and treatment.  Patient has excellent compliance with the device for treatment of sleep apnea.  I have personally reviewed the smart card download and discussed the download data with the patient and encouarged continued use of the device.  The residual AHI is acceptable. The device is benefiting the patient and the device is medically necessary.  Recommend patient get supplies from the DME company, change them on a regular basis, and clean as directed.  A prescription for supplies has been sent to the [facility].  Recommend continued usage of PAP device, most insurances require minimum usage of 4 hours per night at least 70% of the time, would recommend using all night every night if possible for optimum health.  Recommend prioritizing sleep to aid in overall health.  Increase minimum pressure and ramp pressure from 8 cm H2O to 9 cm H2O.  Turn humidifier on at a 1 and patient to use distilled water.  Discussed how to address accordingly.  Follow-up in 2 to 3 months or sooner for issues or concerns.  Obesity: Body mass index is 35.06 kg/m².. Patients who are overweight or obese are at increased risk of sleep apnea/ sleep disordered breathing. Weight reduction and healthy lifestyle are encouraged in overweight/ obese patients as part of a comprehensive approach to sleep apnea treatment.    Hypertension: BP elevated in the office today, reports he just took medications before coming in,  reports stays lower at home.  Asymptomatic in office. He will recheck at home and contact managing provider if still elevated.    Chronic dyspnea: Reports [place].  Denies chest pain or discomfort.  Reports symptoms are not new or worsening.  Recommended he address further with PCP.  History of lumbar spine fusion      Patient will follow-up in 2 to 3 months or follow-up sooner for any issues or concerns.  Patient's questions were answered.          Thank you for allowing me to participate in the care of this patient.     Isamar Mack DNP, APRN  [facility]

## 2024-07-24 ENCOUNTER — TRANSCRIBE ORDERS (OUTPATIENT)
Dept: ADMINISTRATIVE | Facility: HOSPITAL | Age: 64
End: 2024-07-24
Payer: COMMERCIAL

## 2024-07-24 DIAGNOSIS — M75.102 TEAR OF LEFT ROTATOR CUFF, UNSPECIFIED TEAR EXTENT, UNSPECIFIED WHETHER TRAUMATIC: Primary | ICD-10-CM

## 2024-08-09 ENCOUNTER — OFFICE VISIT (OUTPATIENT)
Dept: ORTHOPEDIC SURGERY | Facility: CLINIC | Age: 64
End: 2024-08-09
Payer: COMMERCIAL

## 2024-08-09 VITALS
SYSTOLIC BLOOD PRESSURE: 150 MMHG | HEART RATE: 54 BPM | HEIGHT: 67 IN | DIASTOLIC BLOOD PRESSURE: 78 MMHG | BODY MASS INDEX: 33.74 KG/M2 | WEIGHT: 215 LBS | OXYGEN SATURATION: 97 %

## 2024-08-09 DIAGNOSIS — M25.512 LEFT SHOULDER PAIN, UNSPECIFIED CHRONICITY: Primary | ICD-10-CM

## 2024-08-09 DIAGNOSIS — M75.102 TEAR OF LEFT ROTATOR CUFF, UNSPECIFIED TEAR EXTENT, UNSPECIFIED WHETHER TRAUMATIC: ICD-10-CM

## 2024-08-09 RX ORDER — HYDROCODONE BITARTRATE AND ACETAMINOPHEN 5; 325 MG/1; MG/1
TABLET ORAL
COMMUNITY
Start: 2024-07-24

## 2024-08-09 RX ORDER — CYCLOBENZAPRINE HCL 10 MG
10 TABLET ORAL 3 TIMES DAILY PRN
Qty: 30 TABLET | Refills: 0 | Status: SHIPPED | OUTPATIENT
Start: 2024-08-09

## 2024-08-09 NOTE — PROGRESS NOTES
"Chief Complaint  Initial Evaluation and Pain of the Left Shoulder    Subjective          Christiano Barr presents to [facility] for   History of Present Illness    Christiano presents today for evaluation of his left shoulder.  He reports he had an injury to the left shoulder 1 month ago while bush hogging.  He had severe pain.  He is attempted a steroid injection without relief.  He has tried multiple medications without relief.  He has a history of right shoulder rotator cuff arthropathy.  No prior left shoulder surgeries.    Allergies   Allergen Reactions    Doxycycline Photosensitivity        Social History     Socioeconomic History    Marital status:    Tobacco Use    Smoking status: Never    Smokeless tobacco: Never   Vaping Use    Vaping status: Never Used   Substance and Sexual Activity    Alcohol use: Yes     Alcohol/week: 8.0 standard drinks of alcohol     Types: 8 Cans of beer per week     Comment: 3-4 beers a day     Drug use: No    Sexual activity: Defer        I reviewed the patient's chief complaint, history of present illness, review of systems, past medical history, surgical history, family history, social history, medications, and allergy list.     REVIEW OF SYSTEMS    Constitutional: Denies fevers, chills, weight loss  Cardiovascular: Denies chest pain, shortness of breath  Skin: Denies rashes, acute skin changes  Neurologic: Denies headache, loss of consciousness  MSK: Left shoulder pain      Objective   Vital Signs:   /78   Pulse 54   Ht 170.2 cm (67\")   Wt 97.5 kg (215 lb)   SpO2 97%   BMI 33.67 kg/m²     Body mass index is 33.67 kg/m².    Physical Exam    General: Alert. No acute distress.   Left upper extremity: No point tenderness.  Assisted elevation to 180 degrees, X rotation 45 degrees, internal rotation to the mid lumbar spine.  4+ out of 5 rotator cuff testing with pain during supraspinatus testing.  Positive impingement.  Negative speeds " and [place].  Neurovascular intact in the hand.    Procedures    Imaging Results (Most Recent)       Procedure Component Value Units Date/Time    XR Shoulder 2+ View Left [[id number]] Resulted: 08/09/24 0951     Updated: 08/09/24 0952    Narrative:      Indications: Left shoulder pain    Views: AP, Grashey, Scap Y, axillary left shoulder    Findings: No fractures noted.  There is a calcific density in the axillary   recess.  The humeral head appears slightly high riding.  Mild degenerative   changes are present.  Glenohumeral joint reduced.    Comparative Data: No comparative data available                     Assessment and Plan        XR Shoulder 2+ View Left    Result Date: 8/9/2024  Narrative: Indications: Left shoulder pain Views: AP, Grashey, Scap Y, axillary left shoulder Findings: No fractures noted.  There is a calcific density in the axillary recess.  The humeral head appears slightly high riding.  Mild degenerative changes are present.  Glenohumeral joint reduced. Comparative Data: No comparative data available      Diagnoses and all orders for this visit:    1. Left shoulder pain, unspecified chronicity (Primary)  -     XR Shoulder 2+ View Left    2. Tear of left rotator cuff, unspecified tear extent, unspecified whether traumatic  -     MRI Shoulder Left Without Contrast; Future  -     cyclobenzaprine (FLEXERIL) 10 MG tablet; Take 1 tablet by mouth 3 (Three) Times a Day As Needed for Muscle Spasms.  Dispense: 30 tablet; Refill: 0        We discussed his injury.  He has failed nonoperative care with an exam concerning for rotator cuff tear.  An MRI was ordered today.  We will add a muscle relaxer.  He will follow-up after the MRI to discuss results and additional treatment options.      Call or return if worsening symptoms.    Scribed for Marshall Kaur MD by Marshall Kaur MD  08/09/2024   09:55 EDT         Follow Up       MRI results    Patient was given instructions and counseling regarding his condition  or for health maintenance advice. Please see specific information pulled into the AVS if appropriate.       I have personally performed the services described in this document as scribed by the above individual and it is both accurate and complete. Marshall Kaur MD 08/09/24 09:56 EDT

## 2024-08-21 ENCOUNTER — OFFICE VISIT (OUTPATIENT)
Dept: ORTHOPEDIC SURGERY | Facility: CLINIC | Age: 64
End: 2024-08-21
Payer: COMMERCIAL

## 2024-08-21 ENCOUNTER — PREP FOR SURGERY (OUTPATIENT)
Dept: OTHER | Facility: HOSPITAL | Age: 64
End: 2024-08-21
Payer: COMMERCIAL

## 2024-08-21 VITALS — HEIGHT: 67 IN | OXYGEN SATURATION: 97 % | WEIGHT: 215 LBS | HEART RATE: 71 BPM | BODY MASS INDEX: 33.74 KG/M2

## 2024-08-21 DIAGNOSIS — M75.122 NONTRAUMATIC COMPLETE TEAR OF LEFT ROTATOR CUFF: Primary | ICD-10-CM

## 2024-08-21 NOTE — PROGRESS NOTES
"Chief Complaint  Pain and Follow-up of the Left Shoulder    Subjective          Christiano Barr presents to [facility] for a follow up for his left shoulder.     History of Present Illness    The patient presents here today for a follow up for his left shoulder. He was last seen in the office on 08/09/24 where we ordered an MRI on his shoulder. He present to the office today for these results. To review, he reports he had an injury to the left shoulder 1 month ago while bush hogging. He had severe pain. He has attempted a steroid injection without relief. He has tried multiple medications without relief. He has a history of right shoulder rotator cuff arthropathy. No prior left shoulder surgeries.     Allergies   Allergen Reactions    Doxycycline Photosensitivity        Social History     Socioeconomic History    Marital status:    Tobacco Use    Smoking status: Never    Smokeless tobacco: Never   Vaping Use    Vaping status: Never Used   Substance and Sexual Activity    Alcohol use: Yes     Alcohol/week: 8.0 standard drinks of alcohol     Types: 8 Cans of beer per week     Comment: 3-4 beers a day     Drug use: No    Sexual activity: Defer        I reviewed the patient's chief complaint, history of present illness, review of systems, past medical history, surgical history, family history, social history, medications, and allergy list.     REVIEW OF SYSTEMS    Constitutional: Denies fevers, chills, weight loss  Cardiovascular: Denies chest pain, shortness of breath  Skin: Denies rashes, acute skin changes  Neurologic: Denies headache, loss of consciousness  MSK: left shoulder pain       Objective   Vital Signs:   Pulse 71   Ht 170.2 cm (67\")   Wt 97.5 kg (215 lb)   SpO2 97%   BMI 33.67 kg/m²     Body mass index is 33.67 kg/m².    Physical Exam    General: Alert. No acute distress.   Left upper extremity: No point tenderness. Assisted elevation to 180 degrees, X " rotation 45 degrees, internal rotation to the mid lumbar spine. 4+ out of 5 rotator cuff testing with pain during supraspinatus testing. Positive impingement. Negative speeds and [place]. Neurovascular intact in the hand.     Procedures    Imaging Results (Most Recent)       Procedure Component Value Units Date/Time    IMAGING SCANNED [[id number]] Resulted: 08/21/24     Updated: 08/20/24 1406                     Assessment and Plan        XR Shoulder 2+ View Left    Result Date: 8/9/2024  Narrative: Indications: Left shoulder pain Views: AP, Grashey, Scap Y, axillary left shoulder Findings: No fractures noted.  There is a calcific density in the axillary recess.  The humeral head appears slightly high riding.  Mild degenerative changes are present.  Glenohumeral joint reduced. Comparative Data: No comparative data available      Diagnoses and all orders for this visit:    1. Nontraumatic complete tear of left rotator cuff (Primary)    Other orders  -     IMAGING SCANNED        The patient presents here today for a follow up for his left shoulder. MRI was reviewed and discussed with the patient today in the office.     Discussed operative treatment options regarding a left shoulder arthroscopy with rotator cuff repair, possible biceps tenotomy versus tenodesis, subacromial decompression versus non operative treatment options regarding injections, physical therapy and medications.     Patient wishes to proceed with operative treatment. Risks and benefits was discussed and reviewed with the patient today. Patient expressed understanding and wishes to proceed.      Discussed surgery., Risks/benefits discussed with patient including, but not limited to: infection, bleeding, neurovascular damage, re-rupture, aesthetic deformity, need for further surgery, and death., Surgery pamphlet given., Call or return if worsening symptoms., and I am ordering the use of the Nice1 cold therapy machine for 60 days post-op as part of an  opioid-sparing approach to help manage pain and edema.  I feel this is medically necessary for the best care for this patient.       Scribed for Marshall Kaur MD by Amanda Asif  08/21/2024   09:31 EDT         Follow Up       2 weeks post-operatively      Patient was given instructions and counseling regarding his condition or for health maintenance advice. Please see specific information pulled into the AVS if appropriate.       I have personally performed the services described in this document as scribed by the above individual and it is both accurate and complete. Marshall Kaur MD 08/21/24 12:09 EDT

## 2024-08-26 NOTE — PRE-PROCEDURE INSTRUCTIONS
ATIENT INSTRUCTED TO BE:    - NOTHING TO EAT AFTER MIDNIGHT OR CHEW, EXCEPT CAN HAVE CLEAR LIQUIDS 2 HOURS PRIOR TO SURGERY ARRIVAL TIME , NO MORE THAN 8 OZ. (NOTHING RED)     - TO HOLD ALL VITAMINS, SUPPLEMENTS, NSAIDS FOR ONE WEEK PRIOR TO THEIR SURGICAL PROCEDURE    - DO NOT TAKE ______________________ 7 DAYS PRIOR TO PROCEDURE PER ANESTHESIA RECOMMENDATIONS/INSTRUCTIONS     - INSTRUCTED PT TO USE SURGICAL SOAP 1 TIME THE NIGHT PRIOR TO SURGERY ___________ OR THE AM OF SURGERY _____________   USE THE SOAP FROM NECK TO TOES, AVOID THEIR FACE, HAIR, AND PRIVATE PARTS. IF USE THE SOAP THE NIGHT PRIOR TO SURGERY, CHANGE BED LINENS AND NO PETS IN THE BED.     INSTRUCTED NO LOTIONS, JEWELRY, PIERCINGS,  NAIL POLISH, OR DEODORANT DAY OF SURGERY    - IF DIABETIC, CHECK BLOOD GLUCOSE IF LESS THAN 70 OR HAVING SYMPTOMS CALL THE PREOP AREA FOR INSTRUCTIONS ON AM OF SURGERY ([phone])    -INSTRUCTED TO TAKE THE FOLLOWING MEDICATIONS THE DAY OF SURGERY WITH SIPS OF WATER:   AMLODIPINE PRAVASTATIN, METOPROLOL, SINGULAIR, PANTOPRAZOLE        - DO NOT BRING ANY MEDICATIONS WITH YOU TO THE HOSPITAL THE DAY OF SURGERY, EXCEPT IF USE INHALERS. BRING INHALERS DAY OF SURGERY       - BRING CPAP OR BIPAP TO THE HOSPITAL ONLY IF YOU ARE SPENDING THE NIGHT    - DO NOT SMOKE OR VAPE 24 HOURS PRIOR TO PROCEDURE PER ANESTHESIA REQUEST     -MAKE SURE YOU HAVE A RIDE HOME OR SOMEONE TO STAY WITH YOU THE DAY OF THE PROCEDURE AFTER YOU GO HOME     - FOLLOW ANY OTHER INSTRUCTIONS GIVEN TO YOU BY YOUR SURGEON'S OFFICE.     - DAY OF SURGERY _ COME TO [facility]/ [facility], FIRST FLOOR. CHECK IN AT THE DESK FOR REGISTRATION/SURGERY    - YOU WILL RECEIVE A PHONE CALL THE DAY PRIOR TO SURGERY BETWEEN 1PM AND 4 PM WITH ARRIVAL TIME, IF YOUR SURGERY IS ON A MONDAY YOU WILL RECEIVE A CALL THE FRIDAY PRIOR TO SURGERY DATE    - BRING CASH OR CREDIT CARD FOR COPAYMENT OF MEDICATIONS AFTER SURGERY IF YOU USE THE HOSPITAL PHARMACY (MEDS TO BED)  NA  -  PREADMISSION TESTING NURSE GILL JAIN [phone] IF HAVE ANY QUESTIONS     -PATIENT PROVIDED THE NUMBER FOR PREOP SURGICAL DEPT IF HAD QUESTIONS AFTER HOURS PRIOR TO SURGERY ([phone]).  INFORMED PT IF NO ANSWER, LEAVE A MESSAGE AND SOMEONE WILL RETURN THEIR CALL       PATIENT VERBALIZED UNDERSTANDING

## 2024-08-27 ENCOUNTER — ANESTHESIA EVENT CONVERTED (OUTPATIENT)
Dept: ANESTHESIOLOGY | Facility: HOSPITAL | Age: 64
End: 2024-08-27
Payer: COMMERCIAL

## 2024-08-27 ENCOUNTER — HOSPITAL ENCOUNTER (OUTPATIENT)
Facility: HOSPITAL | Age: 64
Setting detail: HOSPITAL OUTPATIENT SURGERY
Discharge: HOME | End: 2024-08-27
Attending: STUDENT IN AN ORGANIZED HEALTH CARE EDUCATION/TRAINING PROGRAM | Admitting: STUDENT IN AN ORGANIZED HEALTH CARE EDUCATION/TRAINING PROGRAM
Payer: COMMERCIAL

## 2024-08-27 ENCOUNTER — ANESTHESIA (OUTPATIENT)
Dept: PERIOP | Facility: HOSPITAL | Age: 64
End: 2024-08-27
Payer: COMMERCIAL

## 2024-08-27 ENCOUNTER — ANESTHESIA EVENT (OUTPATIENT)
Dept: PERIOP | Facility: HOSPITAL | Age: 64
End: 2024-08-27
Payer: COMMERCIAL

## 2024-08-27 VITALS
OXYGEN SATURATION: 98 % | TEMPERATURE: 97.8 F | HEART RATE: 65 BPM | SYSTOLIC BLOOD PRESSURE: 155 MMHG | HEIGHT: 67 IN | DIASTOLIC BLOOD PRESSURE: 78 MMHG | BODY MASS INDEX: 33.74 KG/M2 | WEIGHT: 214.95 LBS | RESPIRATION RATE: 13 BRPM

## 2024-08-27 PROCEDURE — G0463 HOSPITAL OUTPT CLINIC VISIT: HCPCS | Performed by: STUDENT IN AN ORGANIZED HEALTH CARE EDUCATION/TRAINING PROGRAM

## 2024-08-27 RX ORDER — MIDAZOLAM HYDROCHLORIDE 2 MG/2ML
2 INJECTION, SOLUTION INTRAMUSCULAR; INTRAVENOUS ONCE
Status: DISCONTINUED | OUTPATIENT
Start: 2024-08-27 | End: 2024-08-27

## 2024-08-27 RX ORDER — ACETAMINOPHEN 500 MG
1000 TABLET ORAL ONCE
Status: DISCONTINUED | OUTPATIENT
Start: 2024-08-27 | End: 2024-08-27

## 2024-08-27 RX ORDER — SODIUM CHLORIDE, SODIUM LACTATE, POTASSIUM CHLORIDE, CALCIUM CHLORIDE 600; 310; 30; 20 MG/100ML; MG/100ML; MG/100ML; MG/100ML
9 INJECTION, SOLUTION INTRAVENOUS CONTINUOUS PRN
Status: DISCONTINUED | OUTPATIENT
Start: 2024-08-27 | End: 2024-08-27

## 2024-08-27 RX ORDER — FENTANYL CITRATE 50 UG/ML
100 INJECTION, SOLUTION INTRAMUSCULAR; INTRAVENOUS ONCE
Status: DISCONTINUED | OUTPATIENT
Start: 2024-08-27 | End: 2024-08-27

## 2024-08-27 NOTE — PROGRESS NOTES
Patient elected to cancel surgery today.  He will follow-up in my office in 8 weeks for reevaluation.    Electronically signed by Marshall Kaur MD, 08/27/24, 7:53 AM EDT.

## 2024-08-27 NOTE — ANESTHESIA PROCEDURE NOTES
Peripheral Block    Pre-sedation assessment completed: 8/27/2024 7:58 AM    Patient reassessed immediately prior to procedure    Patient location during procedure: pre-op  Reason for block: at surgeon's request and post-op pain management  Performed by  Anesthesiologist: Jennifer Daley MD  Preanesthetic Checklist  Completed: patient identified, IV checked, site marked, risks and benefits discussed, surgical consent, monitors and equipment checked, pre-op evaluation and timeout performed  Prep:  Pt Position: supine (HOB elevated)  Sterile barriers:cap, washed/disinfected hands, sterile barriers, gloves, mask, partial drape and alcohol skin prep  Prep: ChloraPrep  Patient monitoring: blood pressure monitoring, continuous pulse oximetry and EKG  Procedure    Sedation: yes  Performed under: local infiltration  Guidance:ultrasound guided and nerve stimulator    ULTRASOUND INTERPRETATION.  Using ultrasound guidance a 22 G gauge needle was placed in close proximity to the brachial plexus nerve, at which point, under ultrasound guidance anesthetic was injected in the area of the nerve and spread of the anesthesia was seen on ultrasound in close proximity thereto.  There were no abnormalities seen on ultrasound; a digital image was taken; and the patient tolerated the procedure with no complications. Images:still images obtained, printed/placed on chart    Laterality:left  Block Type:interscalene  Injection Technique:single-shot  Needle Type:echogenic  Needle Gauge:22 G (2in)  Resistance on Injection: none          Post Assessment  Injection Assessment: negative aspiration for heme, no paresthesia on injection and incremental injection  Patient Tolerance:comfortable throughout block  Complications:no  Additional Notes  The block or continuous infusion is requested by the referring physician for management of postoperative pain, or pain related to a procedure. Ultrasound guidance (deemed medically necessary). Painless  injection, pt was awake and conversant during the procedure without complications. Needle and surrounding structures visualized throughout procedure. No adverse reactions or complications seen during this period. Post-procedure image showed no signs of complication, and anatomy was consistent with an uncomplicated nerve blockade.  Performed by: Jennifer Daley MD

## 2024-10-21 ENCOUNTER — OFFICE VISIT (OUTPATIENT)
Dept: ORTHOPEDIC SURGERY | Facility: CLINIC | Age: 64
End: 2024-10-21
Payer: COMMERCIAL

## 2024-10-21 VITALS
HEART RATE: 71 BPM | BODY MASS INDEX: 35.94 KG/M2 | SYSTOLIC BLOOD PRESSURE: 156 MMHG | OXYGEN SATURATION: 95 % | HEIGHT: 67 IN | DIASTOLIC BLOOD PRESSURE: 75 MMHG | WEIGHT: 229 LBS

## 2024-10-21 DIAGNOSIS — M75.102 TEAR OF LEFT ROTATOR CUFF, UNSPECIFIED TEAR EXTENT, UNSPECIFIED WHETHER TRAUMATIC: ICD-10-CM

## 2024-10-21 DIAGNOSIS — M75.122 NONTRAUMATIC COMPLETE TEAR OF LEFT ROTATOR CUFF: Primary | ICD-10-CM

## 2024-10-21 PROCEDURE — 3077F SYST BP >= 140 MM HG: CPT | Performed by: STUDENT IN AN ORGANIZED HEALTH CARE EDUCATION/TRAINING PROGRAM

## 2024-10-21 PROCEDURE — 99213 OFFICE O/P EST LOW 20 MIN: CPT | Performed by: STUDENT IN AN ORGANIZED HEALTH CARE EDUCATION/TRAINING PROGRAM

## 2024-10-21 PROCEDURE — 1160F RVW MEDS BY RX/DR IN RCRD: CPT | Performed by: STUDENT IN AN ORGANIZED HEALTH CARE EDUCATION/TRAINING PROGRAM

## 2024-10-21 PROCEDURE — 3078F DIAST BP <80 MM HG: CPT | Performed by: STUDENT IN AN ORGANIZED HEALTH CARE EDUCATION/TRAINING PROGRAM

## 2024-10-21 PROCEDURE — 1159F MED LIST DOCD IN RCRD: CPT | Performed by: STUDENT IN AN ORGANIZED HEALTH CARE EDUCATION/TRAINING PROGRAM

## 2024-10-21 NOTE — PROGRESS NOTES
"Chief Complaint  Follow-up and Pain of the Left Shoulder    Subjective          Christiano Barr presents to [facility] for a follow up for his left shoulder.     History of Present Illness    The patient presents here today for a follow up for his left shoulder. He was scheduled for left shoulder arthroscopy back in August but ended up cancelling his surgery at that time. He reports his shoulder is doing well and not having any complications to his left shoulder. He denies any decrease in strength or range of motion. He has had steroid injections in the past with some relief. He has had his right shoulder rotator cuff arthroscopy in the past but no prior surgeries on his left shoulder.     Allergies   Allergen Reactions    Doxycycline Photosensitivity        Social History     Socioeconomic History    Marital status:    Tobacco Use    Smoking status: Never    Smokeless tobacco: Never   Vaping Use    Vaping status: Never Used   Substance and Sexual Activity    Alcohol use: Yes     Alcohol/week: 8.0 standard drinks of alcohol     Types: 8 Cans of beer per week     Comment: 3-4 beers a day     Drug use: No    Sexual activity: Defer        I reviewed the patient's chief complaint, history of present illness, review of systems, past medical history, surgical history, family history, social history, medications, and allergy list.     REVIEW OF SYSTEMS    Constitutional: Denies fevers, chills, weight loss  Cardiovascular: Denies chest pain, shortness of breath  Skin: Denies rashes, acute skin changes  Neurologic: Denies headache, loss of consciousness  MSK: left shoulder pain       Objective   Vital Signs:   /75   Pulse 71   Ht 170.2 cm (67\")   Wt 104 kg (229 lb)   SpO2 95%   BMI 35.87 kg/m²     Body mass index is 35.87 kg/m².    Physical Exam    General: Alert. No acute distress.   Left upper extremity: No point tenderness. Assisted elevation to 180 degrees, X " rotation 45 degrees, internal rotation to the mid lumbar spine. 4+ out of 5 rotator cuff testing with pain during supraspinatus testing. Positive impingement. Negative speeds and [place]. Neurovascular intact in the hand.     Procedures    Imaging Results (Most Recent)       None                     Assessment and Plan        No results found.     Diagnoses and all orders for this visit:    1. Nontraumatic complete tear of left rotator cuff (Primary)    2. Tear of left rotator cuff, unspecified tear extent, unspecified whether traumatic        The patient presents here today for a follow up for his left shoulder.     Patient is overall doing well and not having any complications or symptoms. He will continue to monitor his shoulder and will call with any new concerns.      Will obtain X-Rays of left shoulder at next visit.     Call or return if worsening symptoms.    Scribed for Marshall Kaur MD by Amanda Asif  10/21/2024   08:02 EDT         Follow Up       PRN     Patient was given instructions and counseling regarding his condition or for health maintenance advice. Please see specific information pulled into the AVS if appropriate.     I have personally performed the services described in this document as scribed by the above individual and it is both accurate and complete. Marshall Karu MD 10/21/24 08:11 EDT

## 2025-01-14 ENCOUNTER — HOSPITAL ENCOUNTER (OUTPATIENT)
Dept: GENERAL RADIOLOGY | Facility: HOSPITAL | Age: 65
Discharge: HOME | End: 2025-01-14
Admitting: NEUROLOGICAL SURGERY
Payer: COMMERCIAL

## 2025-01-14 ENCOUNTER — TELEPHONE (OUTPATIENT)
Dept: NEUROSURGERY | Facility: CLINIC | Age: 65
End: 2025-01-14
Payer: COMMERCIAL

## 2025-01-14 DIAGNOSIS — Z98.1 HISTORY OF LUMBAR SPINAL FUSION: ICD-10-CM

## 2025-01-14 DIAGNOSIS — Z98.1 HISTORY OF LUMBAR SPINAL FUSION: Primary | ICD-10-CM

## 2025-01-14 PROCEDURE — 72114 X-RAY EXAM L-S SPINE BENDING: CPT

## 2025-01-14 NOTE — TELEPHONE ENCOUNTER
When [facility] called phone call was answered I did advise the hub the order can be placed now. I did request the hub to transfer the patient threw I spoke to the patient to let him know I was placing the order now as we were on the phone. I did ask the patient the where he was currently at to get the Xray done he stated he was at [facility]. The xray has been completed.

## 2025-01-14 NOTE — TELEPHONE ENCOUNTER
PATIENT CALLED IN AND STATES HE WENT IN TO GET HIS XRAY AND WAS TOLD THERE IS NO XRAY ORDER IN HIS CHART     CONFIRMED NO XRAY ORDER; PLEASE SUBMIT XRAY ORDER FOR PATIENT ASAP; HE DROVE 60 MILES TO GET XRAY     ATTEMPTED WT AND NOT SUCCESSFUL     HE IS SCHEDULED WITH DR. READ ON 2/1/25     THANK YOU!

## 2025-02-01 ENCOUNTER — OFFICE VISIT (OUTPATIENT)
Dept: NEUROSURGERY | Facility: CLINIC | Age: 65
End: 2025-02-01
Payer: COMMERCIAL

## 2025-02-01 VITALS
DIASTOLIC BLOOD PRESSURE: 72 MMHG | RESPIRATION RATE: 20 BRPM | WEIGHT: 229 LBS | BODY MASS INDEX: 35.94 KG/M2 | HEIGHT: 67 IN | SYSTOLIC BLOOD PRESSURE: 138 MMHG

## 2025-02-01 DIAGNOSIS — Z98.1 HISTORY OF LUMBAR SPINAL FUSION: ICD-10-CM

## 2025-02-01 DIAGNOSIS — M79.671 RIGHT FOOT PAIN: ICD-10-CM

## 2025-02-01 DIAGNOSIS — T84.226D DISPLACEMENT OF INTERNAL FIXATION DEVICE OF VERTEBRAE, SUBSEQUENT ENCOUNTER: Primary | ICD-10-CM

## 2025-02-01 NOTE — PROGRESS NOTES
Subjective   Patient ID: Christiano Barr is a 64 y.o. male is here today for follow-up.    It has been about 3 years since I fusion from L4-S1.  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  The same is true today.  X-rays were reviewed and they look the same.  He still has the right foot pain which I do not think is related to the spine.  He sees a podiatrist Dr. Martinez about this and gets injections which only helped for short period of time.  I suggested that he might want to get a second opinion from a foot and ankle physician or another podiatrist about this but he does not want to.  Will just continue to watch him.  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  I will see him in 18 months with x-rays    History of Present Illness    The following portions of the patient's history were reviewed and updated as appropriate: allergies, current medications, past family history, past medical history, past social history, past surgical history, and problem list.    Review of Systems   Constitutional:  Negative for fever.   Musculoskeletal:  Negative for back pain and gait problem.   Neurological:  Negative for weakness and numbness.   All other systems reviewed and are negative.      Objective   Physical Exam  Constitutional:       General: He is awake.      Appearance: He is well-developed.   HENT:      Head: Normocephalic and atraumatic.   Eyes:      General: Lids are normal.      Extraocular Movements: Extraocular movements intact.      Conjunctiva/sclera: Conjunctivae normal.      Pupils: Pupils are equal, round, and reactive to light.   Neck:      Vascular: No carotid bruit.   Neurological:      Mental Status: He is alert.      Coordination: Coordination is intact.      Deep  Tendon Reflexes:      Reflex Scores:       Tricep reflexes are 2+ on the right side and 2+ on the left side.       Bicep reflexes are 2+ on the right side and 2+ on the left side.       Brachioradialis reflexes are 2+ on the right side and 2+ on the left side.       Patellar reflexes are 2+ on the right side and 2+ on the left side.       Achilles reflexes are 2+ on the right side and 2+ on the left side.  Psychiatric:         Speech: Speech normal.       Neurological Exam  Mental Status  Awake and alert. Oriented only to person, place, time and situation. Recent and remote memory are intact. Speech is normal. Language is fluent with no aphasia. Attention and concentration are normal. Fund of knowledge is appropriate for level of education.    Cranial Nerves  CN II: Visual acuity is normal. Visual fields full to confrontation.  CN III, IV, VI: Extraocular movements intact bilaterally. Normal lids and orbits bilaterally. Pupils equal round and reactive to light bilaterally.  CN V: Facial sensation is normal.  CN VII: Full and symmetric facial movement.  CN IX, X: Palate elevates symmetrically. Normal gag reflex.  CN XI: Shoulder shrug strength is normal.  CN XII: Tongue midline without atrophy or fasciculations.    Motor  Normal muscle bulk throughout. Normal muscle tone.                                               Right                     Left  Rhomboids                            5                          5  Infraspinatus                          5                          5  Supraspinatus                       5                          5  Deltoid                                   5                          5   Biceps                                   5                          5  Brachioradialis                      5                          5   Triceps                                  5                          5   Pronator                                5                          5   Supinator                               5                           5   Wrist flexor                            5                          5   Wrist extensor                       5                          5   Finger flexor                          5                          5   Finger extensor                     5                          5   Interossei                              5                          5   Abductor pollicis brevis         5                          5   Flexor pollicis brevis             5                          5   Opponens pollicis                 5                          5  Extensor digitorum               5                          5  Abductor digiti minimi           5                          5   Abdominal                            5                          5  Glutei                                    5                          5  Hip abductor                         5                          5  Hip adductor                         5                          5   Iliopsoas                               5                          5   Quadriceps                           5                          5   Hamstring                             5                          5   Gastrocnemius                     5                           5   Anterior tibialis                      5                          5   Posterior tibialis                    5                          5   Peroneal                               5                          5  Ankle dorsiflexor                   5                          5  Ankle plantar flexor              5                           5  Extensor hallucis longus      5                           5    Sensory  Light touch is normal in upper and lower extremities. Proprioception is normal in upper and lower extremities.     Reflexes                                            Right                      Left  Brachioradialis                    2+                         2+  Biceps                                  2+                         2+  Triceps                                2+                         2+  Finger flex                           2+                         2+  Hamstring                            2+                         2+  Patellar                                2+                         2+  Achilles                                2+                         2+    Coordination    Finger-to-nose, rapid alternating movements and heel-to-shin normal bilaterally without dysmetria.    Gait  Casual gait is normal including stance, stride, and arm swing.Normal toe walking. Normal heel walking. Normal tandem gait.       Assessment & Plan   Independent Review of Radiographic Studies:      X-rays done on 1/3/2024 show no change in the position of the cage that had migrated on the left at L5-S1 into the canal.  It looks the same as it did a year ago.  The other cages and the pedicle screws and rods from L4-S1 are stable.  Agree with the report.      Medical Decision Making:      Again, I encouraged him to get a second opinion about the right foot since I do not think this is related to any spinal compression and the lumbar region.  I will see him in 18 months with x-rays.  He will call if there are any changes in symptoms.      Diagnoses and all orders for this visit:    1. Displacement of internal fixation device of vertebrae, subsequent encounter (Primary)    2. History of lumbar spinal fusion  -     XR Spine Lumbar Complete With Flex & Ext; Future    3. Right foot pain      Return in about 18 months (around 8/1/2026) for face to face.

## 2025-04-29 ENCOUNTER — APPOINTMENT (OUTPATIENT)
Dept: GENERAL RADIOLOGY | Facility: HOSPITAL | Age: 65
End: 2025-04-29
Payer: COMMERCIAL

## 2025-04-29 ENCOUNTER — HOSPITAL ENCOUNTER (INPATIENT)
Facility: HOSPITAL | Age: 65
LOS: 2 days | Discharge: HOME | End: 2025-05-01
Attending: EMERGENCY MEDICINE | Admitting: INTERNAL MEDICINE
Payer: COMMERCIAL

## 2025-04-29 DIAGNOSIS — I25.2 H/O ST ELEVATION MYOCARDIAL INFARCTION: ICD-10-CM

## 2025-04-29 DIAGNOSIS — I21.3 ST ELEVATION MYOCARDIAL INFARCTION (STEMI), UNSPECIFIED ARTERY: Primary | ICD-10-CM

## 2025-04-29 PROBLEM — I21.11 ST ELEVATION MYOCARDIAL INFARCTION INVOLVING RIGHT CORONARY ARTERY: Status: ACTIVE | Noted: 2025-04-29

## 2025-04-29 LAB
ACT BLD: 273 SECONDS (ref 89–137)
ALBUMIN SERPL-MCNC: 4.5 G/DL (ref 3.5–5.2)
ALBUMIN/GLOB SERPL: 1.6 G/DL
ALP SERPL-CCNC: 77 U/L (ref 39–117)
ALT SERPL W P-5'-P-CCNC: 27 U/L (ref 1–41)
ANION GAP SERPL CALCULATED.3IONS-SCNC: 15.3 MMOL/L (ref 5–15)
AST SERPL-CCNC: 27 U/L (ref 1–40)
BASOPHILS # BLD AUTO: 0.04 10*3/MM3 (ref 0–0.2)
BASOPHILS NFR BLD AUTO: 0.4 % (ref 0–1.5)
BILIRUB SERPL-MCNC: 0.7 MG/DL (ref 0–1.2)
BUN SERPL-MCNC: 19 MG/DL (ref 8–23)
BUN/CREAT SERPL: 18.4 (ref 7–25)
CALCIUM SPEC-SCNC: 9.1 MG/DL (ref 8.6–10.5)
CHLORIDE SERPL-SCNC: 104 MMOL/L (ref 98–107)
CK MB SERPL-CCNC: 4.2 NG/ML
CK SERPL-CCNC: 263 U/L (ref 20–200)
CO2 SERPL-SCNC: 21.7 MMOL/L (ref 22–29)
CREAT SERPL-MCNC: 1.03 MG/DL (ref 0.76–1.27)
DEPRECATED RDW RBC AUTO: 42.1 FL (ref 37–54)
EGFRCR SERPLBLD CKD-EPI 2021: 81.1 ML/MIN/1.73
EOSINOPHIL # BLD AUTO: 0.19 10*3/MM3 (ref 0–0.4)
EOSINOPHIL NFR BLD AUTO: 2.1 % (ref 0.3–6.2)
ERYTHROCYTE [DISTWIDTH] IN BLOOD BY AUTOMATED COUNT: 12.7 % (ref 12.3–15.4)
GEN 5 1HR TROPONIN T REFLEX: 255 NG/L
GLOBULIN UR ELPH-MCNC: 2.8 GM/DL
GLUCOSE SERPL-MCNC: 103 MG/DL (ref 65–99)
HCT VFR BLD AUTO: 43.5 % (ref 37.5–51)
HGB BLD-MCNC: 15.5 G/DL (ref 13–17.7)
HOLD SPECIMEN: NORMAL
IMM GRANULOCYTES # BLD AUTO: 0.04 10*3/MM3 (ref 0–0.05)
IMM GRANULOCYTES NFR BLD AUTO: 0.4 % (ref 0–0.5)
LIPASE SERPL-CCNC: 25 U/L (ref 13–60)
LYMPHOCYTES # BLD AUTO: 1.42 10*3/MM3 (ref 0.7–3.1)
LYMPHOCYTES NFR BLD AUTO: 15.7 % (ref 19.6–45.3)
MAGNESIUM SERPL-MCNC: 2.3 MG/DL (ref 1.6–2.4)
MCH RBC QN AUTO: 32.6 PG (ref 26.6–33)
MCHC RBC AUTO-ENTMCNC: 35.6 G/DL (ref 31.5–35.7)
MCV RBC AUTO: 91.4 FL (ref 79–97)
MONOCYTES # BLD AUTO: 0.77 10*3/MM3 (ref 0.1–0.9)
MONOCYTES NFR BLD AUTO: 8.5 % (ref 5–12)
NEUTROPHILS NFR BLD AUTO: 6.61 10*3/MM3 (ref 1.7–7)
NEUTROPHILS NFR BLD AUTO: 72.9 % (ref 42.7–76)
NRBC BLD AUTO-RTO: 0 /100 WBC (ref 0–0.2)
NT-PROBNP SERPL-MCNC: 68.1 PG/ML (ref 0–900)
PLATELET # BLD AUTO: 231 10*3/MM3 (ref 140–450)
PMV BLD AUTO: 9.1 FL (ref 6–12)
POTASSIUM SERPL-SCNC: 3.6 MMOL/L (ref 3.5–5.2)
PROT SERPL-MCNC: 7.3 G/DL (ref 6–8.5)
RBC # BLD AUTO: 4.76 10*6/MM3 (ref 4.14–5.8)
SODIUM SERPL-SCNC: 141 MMOL/L (ref 136–145)
TROPONIN T NUMERIC DELTA: 235 NG/L
TROPONIN T SERPL HS-MCNC: 20 NG/L
WBC NRBC COR # BLD AUTO: 9.07 10*3/MM3 (ref 3.4–10.8)
WHOLE BLOOD HOLD COAG: NORMAL
WHOLE BLOOD HOLD SPECIMEN: NORMAL

## 2025-04-29 PROCEDURE — C1887 CATHETER, GUIDING: HCPCS | Performed by: INTERNAL MEDICINE

## 2025-04-29 PROCEDURE — 71045 X-RAY EXAM CHEST 1 VIEW: CPT

## 2025-04-29 PROCEDURE — B2151ZZ FLUOROSCOPY OF LEFT HEART USING LOW OSMOLAR CONTRAST: ICD-10-PCS | Performed by: INTERNAL MEDICINE

## 2025-04-29 PROCEDURE — 36415 COLL VENOUS BLD VENIPUNCTURE: CPT | Performed by: INTERNAL MEDICINE

## 2025-04-29 PROCEDURE — 93005 ELECTROCARDIOGRAM TRACING: CPT | Performed by: INTERNAL MEDICINE

## 2025-04-29 PROCEDURE — C1769 GUIDE WIRE: HCPCS | Performed by: INTERNAL MEDICINE

## 2025-04-29 PROCEDURE — 25010000002 HYDRALAZINE PER 20 MG: Performed by: INTERNAL MEDICINE

## 2025-04-29 PROCEDURE — 027034Z DILATION OF CORONARY ARTERY, ONE ARTERY WITH DRUG-ELUTING INTRALUMINAL DEVICE, PERCUTANEOUS APPROACH: ICD-10-PCS | Performed by: INTERNAL MEDICINE

## 2025-04-29 PROCEDURE — 93458 L HRT ARTERY/VENTRICLE ANGIO: CPT | Performed by: INTERNAL MEDICINE

## 2025-04-29 PROCEDURE — 99291 CRITICAL CARE FIRST HOUR: CPT

## 2025-04-29 PROCEDURE — 99291 CRITICAL CARE FIRST HOUR: CPT | Performed by: INTERNAL MEDICINE

## 2025-04-29 PROCEDURE — 25010000002 LIDOCAINE 2% SOLUTION: Performed by: INTERNAL MEDICINE

## 2025-04-29 PROCEDURE — 93005 ELECTROCARDIOGRAM TRACING: CPT | Performed by: EMERGENCY MEDICINE

## 2025-04-29 PROCEDURE — 83880 ASSAY OF NATRIURETIC PEPTIDE: CPT | Performed by: EMERGENCY MEDICINE

## 2025-04-29 PROCEDURE — 83690 ASSAY OF LIPASE: CPT | Performed by: EMERGENCY MEDICINE

## 2025-04-29 PROCEDURE — C1874 STENT, COATED/COV W/DEL SYS: HCPCS | Performed by: INTERNAL MEDICINE

## 2025-04-29 PROCEDURE — C1760 CLOSURE DEV, VASC: HCPCS | Performed by: INTERNAL MEDICINE

## 2025-04-29 PROCEDURE — 25810000003 SODIUM CHLORIDE 0.9 % SOLUTION: Performed by: INTERNAL MEDICINE

## 2025-04-29 PROCEDURE — 25010000002 MIDAZOLAM PER 1MG: Performed by: INTERNAL MEDICINE

## 2025-04-29 PROCEDURE — C9606 PERC D-E COR REVASC W AMI S: HCPCS | Performed by: INTERNAL MEDICINE

## 2025-04-29 PROCEDURE — 4A023N7 MEASUREMENT OF CARDIAC SAMPLING AND PRESSURE, LEFT HEART, PERCUTANEOUS APPROACH: ICD-10-PCS | Performed by: INTERNAL MEDICINE

## 2025-04-29 PROCEDURE — 99153 MOD SED SAME PHYS/QHP EA: CPT | Performed by: INTERNAL MEDICINE

## 2025-04-29 PROCEDURE — 82550 ASSAY OF CK (CPK): CPT | Performed by: INTERNAL MEDICINE

## 2025-04-29 PROCEDURE — 82553 CREATINE MB FRACTION: CPT | Performed by: INTERNAL MEDICINE

## 2025-04-29 PROCEDURE — 25010000002 DIPHENHYDRAMINE PER 50 MG: Performed by: INTERNAL MEDICINE

## 2025-04-29 PROCEDURE — 25010000002 HEPARIN (PORCINE) PER 1000 UNITS: Performed by: INTERNAL MEDICINE

## 2025-04-29 PROCEDURE — 25010000002 HEPARIN (PORCINE) PER 1000 UNITS: Performed by: EMERGENCY MEDICINE

## 2025-04-29 PROCEDURE — 25510000001 IOPAMIDOL PER 1 ML: Performed by: INTERNAL MEDICINE

## 2025-04-29 PROCEDURE — 84484 ASSAY OF TROPONIN QUANT: CPT | Performed by: INTERNAL MEDICINE

## 2025-04-29 PROCEDURE — 85347 COAGULATION TIME ACTIVATED: CPT

## 2025-04-29 PROCEDURE — B2111ZZ FLUOROSCOPY OF MULTIPLE CORONARY ARTERIES USING LOW OSMOLAR CONTRAST: ICD-10-PCS | Performed by: INTERNAL MEDICINE

## 2025-04-29 PROCEDURE — C1894 INTRO/SHEATH, NON-LASER: HCPCS | Performed by: INTERNAL MEDICINE

## 2025-04-29 PROCEDURE — 84484 ASSAY OF TROPONIN QUANT: CPT | Performed by: EMERGENCY MEDICINE

## 2025-04-29 PROCEDURE — 85025 COMPLETE CBC W/AUTO DIFF WBC: CPT | Performed by: EMERGENCY MEDICINE

## 2025-04-29 PROCEDURE — 25010000002 FENTANYL CITRATE (PF) 50 MCG/ML SOLUTION: Performed by: INTERNAL MEDICINE

## 2025-04-29 PROCEDURE — 80053 COMPREHEN METABOLIC PANEL: CPT | Performed by: EMERGENCY MEDICINE

## 2025-04-29 PROCEDURE — 99152 MOD SED SAME PHYS/QHP 5/>YRS: CPT | Performed by: INTERNAL MEDICINE

## 2025-04-29 PROCEDURE — 83735 ASSAY OF MAGNESIUM: CPT | Performed by: EMERGENCY MEDICINE

## 2025-04-29 PROCEDURE — C1725 CATH, TRANSLUMIN NON-LASER: HCPCS | Performed by: INTERNAL MEDICINE

## 2025-04-29 DEVICE — XIENCE SKYPOINT™ EVEROLIMUS ELUTING CORONARY STENT SYSTEM 2.25 MM X 18 MM / RAPID-EXCHANGE
Type: IMPLANTABLE DEVICE | Status: FUNCTIONAL
Brand: XIENCE SKYPOINT™

## 2025-04-29 RX ORDER — ATORVASTATIN CALCIUM 10 MG/1
TABLET, FILM COATED ORAL
Status: COMPLETED | OUTPATIENT
Start: 2025-04-29 | End: 2025-04-29

## 2025-04-29 RX ORDER — LOSARTAN POTASSIUM 50 MG/1
25 TABLET ORAL
Status: DISCONTINUED | OUTPATIENT
Start: 2025-04-29 | End: 2025-04-30

## 2025-04-29 RX ORDER — MONTELUKAST SODIUM 10 MG/1
10 TABLET ORAL DAILY
Status: DISCONTINUED | OUTPATIENT
Start: 2025-04-30 | End: 2025-05-01 | Stop reason: HOSPADM

## 2025-04-29 RX ORDER — HYDRALAZINE HYDROCHLORIDE 20 MG/ML
20 INJECTION INTRAMUSCULAR; INTRAVENOUS EVERY 4 HOURS PRN
Status: DISCONTINUED | OUTPATIENT
Start: 2025-04-29 | End: 2025-05-01 | Stop reason: HOSPADM

## 2025-04-29 RX ORDER — METOPROLOL SUCCINATE 100 MG/1
100 TABLET, EXTENDED RELEASE ORAL DAILY
COMMUNITY
Start: 2025-04-10

## 2025-04-29 RX ORDER — MORPHINE SULFATE 2 MG/ML
1 INJECTION, SOLUTION INTRAMUSCULAR; INTRAVENOUS EVERY 4 HOURS PRN
Status: ACTIVE | OUTPATIENT
Start: 2025-04-29 | End: 2025-04-30

## 2025-04-29 RX ORDER — ASPIRIN 81 MG/1
81 TABLET, CHEWABLE ORAL DAILY
Status: DISCONTINUED | OUTPATIENT
Start: 2025-04-30 | End: 2025-05-01 | Stop reason: HOSPADM

## 2025-04-29 RX ORDER — FENTANYL CITRATE 50 UG/ML
INJECTION, SOLUTION INTRAMUSCULAR; INTRAVENOUS
Status: DISCONTINUED | OUTPATIENT
Start: 2025-04-29 | End: 2025-04-29 | Stop reason: HOSPADM

## 2025-04-29 RX ORDER — PRAVASTATIN SODIUM 40 MG
40 TABLET ORAL DAILY
COMMUNITY
Start: 2025-04-10 | End: 2025-05-01 | Stop reason: HOSPADM

## 2025-04-29 RX ORDER — ATORVASTATIN CALCIUM 40 MG/1
40 TABLET, FILM COATED ORAL NIGHTLY
Status: DISCONTINUED | OUTPATIENT
Start: 2025-04-29 | End: 2025-04-29

## 2025-04-29 RX ORDER — ATORVASTATIN CALCIUM 40 MG/1
40 TABLET, FILM COATED ORAL DAILY
Status: DISCONTINUED | OUTPATIENT
Start: 2025-04-30 | End: 2025-05-01 | Stop reason: HOSPADM

## 2025-04-29 RX ORDER — LIDOCAINE HYDROCHLORIDE 20 MG/ML
INJECTION, SOLUTION INFILTRATION; PERINEURAL
Status: DISCONTINUED | OUTPATIENT
Start: 2025-04-29 | End: 2025-04-29 | Stop reason: HOSPADM

## 2025-04-29 RX ORDER — IOPAMIDOL 755 MG/ML
INJECTION, SOLUTION INTRAVASCULAR
Status: DISCONTINUED | OUTPATIENT
Start: 2025-04-29 | End: 2025-04-29 | Stop reason: HOSPADM

## 2025-04-29 RX ORDER — DIPHENHYDRAMINE HYDROCHLORIDE 50 MG/ML
INJECTION, SOLUTION INTRAMUSCULAR; INTRAVENOUS
Status: DISCONTINUED | OUTPATIENT
Start: 2025-04-29 | End: 2025-04-29 | Stop reason: HOSPADM

## 2025-04-29 RX ORDER — ASPIRIN 81 MG/1
324 TABLET, CHEWABLE ORAL ONCE
Status: COMPLETED | OUTPATIENT
Start: 2025-04-29 | End: 2025-04-29

## 2025-04-29 RX ORDER — SODIUM CHLORIDE 0.9 % (FLUSH) 0.9 %
10 SYRINGE (ML) INJECTION AS NEEDED
Status: DISCONTINUED | OUTPATIENT
Start: 2025-04-29 | End: 2025-05-01 | Stop reason: HOSPADM

## 2025-04-29 RX ORDER — HEPARIN SODIUM 5000 [USP'U]/ML
INJECTION, SOLUTION INTRAVENOUS; SUBCUTANEOUS
Status: COMPLETED | OUTPATIENT
Start: 2025-04-29 | End: 2025-04-29

## 2025-04-29 RX ORDER — MIDAZOLAM HYDROCHLORIDE 2 MG/2ML
INJECTION, SOLUTION INTRAMUSCULAR; INTRAVENOUS
Status: DISCONTINUED | OUTPATIENT
Start: 2025-04-29 | End: 2025-04-29 | Stop reason: HOSPADM

## 2025-04-29 RX ORDER — NITROGLYCERIN 0.4 MG/1
0.4 TABLET SUBLINGUAL
Status: DISCONTINUED | OUTPATIENT
Start: 2025-04-29 | End: 2025-05-01 | Stop reason: HOSPADM

## 2025-04-29 RX ORDER — TAMSULOSIN HYDROCHLORIDE 0.4 MG/1
0.4 CAPSULE ORAL NIGHTLY
Status: DISCONTINUED | OUTPATIENT
Start: 2025-04-29 | End: 2025-04-29

## 2025-04-29 RX ORDER — SODIUM CHLORIDE 9 MG/ML
100 INJECTION, SOLUTION INTRAVENOUS CONTINUOUS
Status: ACTIVE | OUTPATIENT
Start: 2025-04-29 | End: 2025-04-29

## 2025-04-29 RX ORDER — ONDANSETRON 4 MG/1
4 TABLET, ORALLY DISINTEGRATING ORAL EVERY 6 HOURS PRN
Status: DISCONTINUED | OUTPATIENT
Start: 2025-04-29 | End: 2025-05-01 | Stop reason: HOSPADM

## 2025-04-29 RX ORDER — ATORVASTATIN CALCIUM 40 MG/1
40 TABLET, FILM COATED ORAL NIGHTLY
Status: DISCONTINUED | OUTPATIENT
Start: 2025-04-30 | End: 2025-04-29

## 2025-04-29 RX ORDER — HEPARIN SODIUM 1000 [USP'U]/ML
INJECTION, SOLUTION INTRAVENOUS; SUBCUTANEOUS
Status: DISCONTINUED | OUTPATIENT
Start: 2025-04-29 | End: 2025-04-29 | Stop reason: HOSPADM

## 2025-04-29 RX ORDER — PANTOPRAZOLE SODIUM 40 MG/1
40 TABLET, DELAYED RELEASE ORAL DAILY
Status: DISCONTINUED | OUTPATIENT
Start: 2025-04-30 | End: 2025-05-01 | Stop reason: HOSPADM

## 2025-04-29 RX ORDER — METOPROLOL SUCCINATE 100 MG/1
100 TABLET, EXTENDED RELEASE ORAL DAILY
Status: DISCONTINUED | OUTPATIENT
Start: 2025-04-30 | End: 2025-05-01 | Stop reason: HOSPADM

## 2025-04-29 RX ORDER — ACETAMINOPHEN 325 MG/1
650 TABLET ORAL EVERY 4 HOURS PRN
Status: DISCONTINUED | OUTPATIENT
Start: 2025-04-29 | End: 2025-05-01 | Stop reason: HOSPADM

## 2025-04-29 RX ORDER — ONDANSETRON 2 MG/ML
4 INJECTION INTRAMUSCULAR; INTRAVENOUS EVERY 6 HOURS PRN
Status: DISCONTINUED | OUTPATIENT
Start: 2025-04-29 | End: 2025-05-01 | Stop reason: HOSPADM

## 2025-04-29 RX ORDER — NALOXONE HCL 0.4 MG/ML
0.4 VIAL (ML) INJECTION
Status: DISCONTINUED | OUTPATIENT
Start: 2025-04-29 | End: 2025-05-01 | Stop reason: HOSPADM

## 2025-04-29 RX ORDER — TAMSULOSIN HYDROCHLORIDE 0.4 MG/1
0.4 CAPSULE ORAL DAILY
Status: DISCONTINUED | OUTPATIENT
Start: 2025-04-30 | End: 2025-05-01 | Stop reason: HOSPADM

## 2025-04-29 RX ADMIN — ASPIRIN 324 MG: 81 TABLET, CHEWABLE ORAL at 17:37

## 2025-04-29 RX ADMIN — HYDRALAZINE HYDROCHLORIDE 20 MG: 20 INJECTION INTRAMUSCULAR; INTRAVENOUS at 18:12

## 2025-04-29 RX ADMIN — HEPARIN SODIUM 6120 UNITS: 5000 INJECTION INTRAVENOUS; SUBCUTANEOUS at 15:07

## 2025-04-29 RX ADMIN — LOSARTAN POTASSIUM 25 MG: 50 TABLET, FILM COATED ORAL at 18:12

## 2025-04-29 RX ADMIN — ATORVASTATIN CALCIUM 40 MG: 10 TABLET, FILM COATED ORAL at 15:06

## 2025-04-29 RX ADMIN — TICAGRELOR 90 MG: 90 TABLET ORAL at 20:48

## 2025-04-29 RX ADMIN — SODIUM CHLORIDE 100 ML/HR: 9 INJECTION, SOLUTION INTRAVENOUS at 17:39

## 2025-04-29 NOTE — PLAN OF CARE
Goal Outcome Evaluation:  Plan of Care Reviewed With: patient        Progress: improving  Outcome Evaluation: Hypertensive, given po losartan and prn iv hydralazine. Assessed femoral site, site WNL. Given aspirin and started continuious fluid.

## 2025-04-29 NOTE — H&P
[facility]   CARDIOLOGY HISTORY AND PHYSICAL    Patient Name: Christiano Barr  : 1960  MRN: [id number]  Primary Care Physician:  Daniel Johnson MD  Date of admission: 2025    Subjective   Subjective     Chief Complaint: Chest pain    HPI:    Christiano Barr is a 64 y.o. male with hypertension, hyperlipidemia, chronic back pain, previous surgery, neuropathy of the lower extremities, obstructive sleep apnea and prediabetic state.  He present to the emergency room because of chest pain of several hours duration.  He woke up around 3 AM because of severe heartburn which radiated to the back.  The symptoms eventually subsided but came back later with more intensity.  It later changed to a chest heaviness and pressure along with heartburn.  He had no shortness of breath, palpitations or dizziness.  EMS was called.  EKGs done in the ER showed ST segment elevations in the inferior leads.  He received aspirin loading along with IV heparin.  He was emergently transferred to cardiac Cath Lab.    On arrival to Cath Lab, patient is still reporting moderate chest pain, no significant improvement with nitroglycerin or aspirin.  Cardiac catheterization revealed 100% occlusion of the right PLV branch which appeared to be the culprit lesion.  He underwent successful angioplasty with stent placement.  LAD artery had borderline lesions in the midsegment along with tight lesion involving a small OM branch.  LV gram showed ejection fraction 50% with mild basal to mid inferior wall hypokinesis with a normal LVEDP.  He became chest pain-free at the end of the procedure.    He had no documented previous cardiac history.  He was seen by [facility] cardiology earlier this week for preoperative evaluation before gastric sleeve surgery.    Review of Systems   All systems were reviewed and negative except for: Per HPI    Personal History     Past Medical History:   Diagnosis Date    Acid reflux      Anxiety     Arthritis     OSTEO    Bradycardia     40's-50's NO CARDIOLOGIST NO MEDS    Congenital spondylolysis, lumbosacral region     Hyperlipidemia     Hypertension     Low back pain     Peripheral neuropathy     Pre-diabetes     NO MEDS    Risk factors for obstructive sleep apnea     YOAN 7    Rotator cuff tear     LEFT    Sleep apnea     instructed to bring cpap for surgery     Spinal stenosis of lumbar region with radiculopathy        Family History: Reviewed, no family history of premature coronary artery disease    Social History:  reports that he has never smoked. He has never used smokeless tobacco. He reports current alcohol use of about 8.0 standard drinks of alcohol per week. He reports that he does not use drugs.    Home Medications:  amLODIPine, hydroCHLOROthiazide, losartan-hydrochlorothiazide, meloxicam, metoprolol succinate XL, montelukast, pantoprazole, pravastatin, and tamsulosin      Allergies:  Allergies   Allergen Reactions    Doxycycline Photosensitivity       Objective   Objective     Vitals:   Temp:  [98.2 °F (36.8 °C)] 98.2 °F (36.8 °C)  Heart Rate:  [51] 51  Resp:  [17] 17  BP: (172)/(72) 172/72  Flow (L/min) (Oxygen Therapy):  [2] 2  Physical Exam    Constitutional: Awake, alert, in moderate distress   Eyes: PERRLA, sclerae anicteric, no conjunctival injection   HENT: NCAT, mucous membranes moist   Neck: Supple, no thyromegaly, no lymphadenopathy, trachea midline   Respiratory: Clear to auscultation bilaterally, nonlabored respirations    Cardiovascular: RRR, no murmurs, rubs, or gallops, palpable pedal pulses bilaterally   Gastrointestinal: Positive bowel sounds, soft, nontender, nondistended   Musculoskeletal: No bilateral ankle edema, no clubbing or cyanosis to extremities   Psychiatric: Appropriate affect, cooperative   Neurologic: Oriented x 3,  speech clear   Skin: No rashes     Result Review    Result Review:  I have personally reviewed the results from the time of this  admission to 4/29/2025 18:06 EDT and agree with these findings:  [x]  Laboratory  []  Microbiology  [x]  Radiology  [x]  EKG/Telemetry   [x]  Cardiology/Vascular   []  Pathology  [x]  Old records  []  Other:  Most notable findings include:      Latest Reference Range & Units 04/29/25 15:06   Creatine Kinase 20 - 200 U/L 263 (H)   CKMB <=10.40 ng/mL 4.20   HS Troponin T <22 ng/L 20   proBNP 0.0 - 900.0 pg/mL 68.1         EKG on arrival showed sinus bradycardia, atrial premature complexes, 0.5 to 1 mm ST segment elevation inferior leads with reciprocal changes in high lateral leads and anterior leads, consistent with acute inferior ST segment elevation myocardial infarction.    CMP          4/29/2025    15:06   CMP   Glucose 103    BUN 19    Creatinine 1.03    EGFR 81.1    Sodium 141    Potassium 3.6    Chloride 104    Calcium 9.1    Total Protein 7.3    Albumin 4.5    Globulin 2.8    Total Bilirubin 0.7    Alkaline Phosphatase 77    AST (SGOT) 27    ALT (SGPT) 27    Albumin/Globulin Ratio 1.6    BUN/Creatinine Ratio 18.4    Anion Gap 15.3           Pre and post images        Assessment & Plan   Assessment / Plan     Brief Patient Summary:  Christiano Barr is a 64 y.o. male with hypertension, hyperlipidemia, chronic back pain, previous surgery, neuropathy of the lower extremities, obstructive sleep apnea and prediabetic state.  He presented because of chest pain of several hours duration and was noted to have acute inferior ST segment elevation myocardial infarction.    Active Hospital Problems:  Active Hospital Problems    Diagnosis     **ST elevation myocardial infarction involving right coronary artery      Acute inferior ST segment elevation myocardial infarction : Culprit lesion is 100% occlusion of the PLV branch.  He underwent successful primary angioplasty with stent placement.  LAD artery has borderline lesions and there is an 80% lesion involving small OM branch.  He is currently chest pain-free.   LVEF is 50% with mild basal to mid inferior wall hypokinesis    Essential hypertension : Blood pressure not well-controlled  Prediabetic stage  Next hyperlipidemia, on pravastatin at home    Chronic back pain, previous back surgeries      Plan:     Will continue dual antiplatelet therapy with aspirin and Brilinta  Initiate high intensity statins  Continue home beta-blockers  Starting losartan 25 mg daily  As needed IV hydralazine for systolic blood pressure above 160 mmHg      Echocardiography to be done to reevaluate LV function and valvular function  Further management in intensive care unit tonight  Repeat labs a.m.    VTE Prophylaxis: Bilateral SCDs      CODE STATUS:    Code Status (Patient has no pulse and is not breathing): CPR (Attempt to Resuscitate)  Medical Interventions (Patient has pulse or is breathing): Full Support  Level Of Support Discussed With: Patient    Admission Status:  I believe this patient meets inpatient status.    Electronically signed by Lauri Wyatt MD, 04/29/25, 5:57 PM EDT.

## 2025-04-29 NOTE — Clinical Note
First balloon inflation max pressure = 6 opal. First balloon inflation duration = 10 seconds. Second inflation of balloon - Max pressure = 12 opal. 2nd Inflation of balloon - Duration = 10 seconds.

## 2025-04-29 NOTE — ED PROVIDER NOTES
Time: 3:06 PM EDT  Date of encounter:  4/29/2025  Independent Historian/Clinical History and Information was obtained by:   Patient and EMS    History is limited by: N/A    Chief Complaint: Chest pain      History of Present Illness:  Patient is a 64 y.o. year old male who presents to the emergency department for evaluation of chest pain.  Patient reports onset of chest pain today, EMS administered aspirin and nitro.      Patient Care Team  Primary Care Provider: Daniel Johnson MD    Past Medical History:     Allergies   Allergen Reactions    Doxycycline Photosensitivity     Past Medical History:   Diagnosis Date    Acid reflux     Anxiety     Arthritis     OSTEO    Bradycardia     40's-50's NO CARDIOLOGIST NO MEDS    Congenital spondylolysis, lumbosacral region     Hyperlipidemia     Hypertension     Low back pain     Peripheral neuropathy     Pre-diabetes     NO MEDS    Risk factors for obstructive sleep apnea     YOAN 7    Rotator cuff tear     LEFT    Sleep apnea     instructed to bring cpap for surgery     Spinal stenosis of lumbar region with radiculopathy      Past Surgical History:   Procedure Laterality Date    APPENDECTOMY      INGUINAL HERNIA REPAIR Right     LUMBAR FUSION N/A 1/6/2022    Procedure: Open lumbar decompression with posterior lumbar interbody fusion with cages and pedicle scew/susan fixation lumbar four/five, lumbar five/sacral one using the "[employer]or robot;  Surgeon: Luis Brewer MD;  Location: [facility];  Service: Robotics - Neuro;  Laterality: N/A;    TOTAL KNEE ARTHROPLASTY Left 1/18/2018    Procedure: LT TOTAL KNEE ARTHROPLASTY;  Surgeon: Ralph Panchal MD;  Location: [facility] OR;  Service:      Family History   Problem Relation Age of Onset    Malig Hyperthermia Neg Hx        Home Medications:  Prior to Admission medications    Medication Sig Start Date End Date Taking? Authorizing Provider   amLODIPine (NORVASC) 2.5 MG tablet Take 1 tablet by mouth Daily. 5/29/24    Provider, MD Sharmaine   hydroCHLOROthiazide (HYDRODIURIL) 12.5 MG tablet Take 1 tablet by mouth Daily. 5/18/22   Sharmaine Feldman MD   losartan-hydrochlorothiazide (HYZAAR) 100-25 MG per tablet Take 1 tablet by mouth Daily. 11/14/21   Sharmaine Feldman MD   meloxicam (MOBIC) 15 MG tablet Take 1 tablet by mouth Daily. 5/13/22   Sharmaine Feldman MD   metoprolol succinate XL (TOPROL-XL) 25 MG 24 hr tablet Take 1 tablet by mouth Daily. 12/28/21   Jose Holloway DO   montelukast (SINGULAIR) 10 MG tablet Take 1 tablet by mouth Daily. 8/21/21   Sharmaine Feldman MD   pantoprazole (PROTONIX) 40 MG EC tablet Take 1 tablet by mouth Daily. 11/14/21   Sharmaine Feldman MD   pravastatin (PRAVACHOL) 20 MG tablet Take 1 tablet by mouth Daily. 11/14/21   Sharmaine Feldman MD   tamsulosin (FLOMAX) 0.4 MG capsule 24 hr capsule Take 1 capsule by mouth Daily.  Patient taking differently: Take 1 capsule by mouth Every Night. 1/11/22   Alaina Mascorro APRN        Social History:   Social History     Tobacco Use    Smoking status: Never    Smokeless tobacco: Never   Vaping Use    Vaping status: Never Used   Substance Use Topics    Alcohol use: Yes     Alcohol/week: 8.0 standard drinks of alcohol     Types: 8 Cans of beer per week     Comment: 3-4 beers a day     Drug use: No         Review of Systems:  Review of Systems   Constitutional:  Negative for chills and fever.   HENT:  Negative for congestion, rhinorrhea and sore throat.    Eyes:  Negative for pain and visual disturbance.   Respiratory:  Negative for apnea, cough, chest tightness and shortness of breath.    Cardiovascular:  Positive for chest pain. Negative for palpitations.   Gastrointestinal:  Negative for abdominal pain, diarrhea, nausea and vomiting.   Genitourinary:  Negative for difficulty urinating and dysuria.   Musculoskeletal:  Negative for joint swelling and myalgias.   Skin:  Negative for color change.   Neurological:  Negative for  "seizures and headaches.   Psychiatric/Behavioral: Negative.     All other systems reviewed and are negative.       Physical Exam:  /75 (BP Location: Left leg, Patient Position: Lying)   Pulse 89   Temp 97.7 °F (36.5 °C) (Oral)   Resp 18   Ht 170.2 cm (67\")   Wt 102 kg (225 lb 12 oz)   SpO2 99%   BMI 35.36 kg/m²     Physical Exam  Vitals and nursing note reviewed.   Constitutional:       General: He is not in acute distress.     Appearance: Normal appearance. He is ill-appearing. He is not toxic-appearing.   HENT:      Head: Normocephalic and atraumatic.      Jaw: There is normal jaw occlusion.   Eyes:      General: Lids are normal.      Extraocular Movements: Extraocular movements intact.      Conjunctiva/sclera: Conjunctivae normal.      Pupils: Pupils are equal, round, and reactive to light.   Cardiovascular:      Rate and Rhythm: Normal rate and regular rhythm.      Pulses: Normal pulses.      Heart sounds: Normal heart sounds.   Pulmonary:      Effort: Pulmonary effort is normal. No respiratory distress.      Breath sounds: Normal breath sounds. No wheezing or rhonchi.   Abdominal:      General: Abdomen is flat.      Palpations: Abdomen is soft.      Tenderness: There is no abdominal tenderness. There is no guarding or rebound.   Musculoskeletal:         General: Normal range of motion.      Cervical back: Normal range of motion and neck supple.      Right lower leg: No edema.      Left lower leg: No edema.   Skin:     General: Skin is warm and dry.   Neurological:      Mental Status: He is alert and oriented to person, place, and time. Mental status is at baseline.   Psychiatric:         Mood and Affect: Mood normal.                    Medical Decision Making:      Comorbidities that affect care:    Hypertension, GERD    External Notes reviewed:    Previous Clinic Note: Family medicine office visit for general medical management      The following orders were placed and all results were " independently analyzed by me:  Orders Placed This Encounter   Procedures    XR Chest 1 View    [place] Draw    High Sensitivity Troponin T    Comprehensive Metabolic Panel    Lipase    BNP    Magnesium    CBC Auto Differential    High Sensitivity Troponin T 1Hr    CK    CK-MB    Comprehensive Metabolic Panel    Hemoglobin A1c    Lipid Panel    Magnesium    High Sensitivity Troponin T    TSH    CK    CK-MB    Diet: Cardiac; Healthy Heart (2-3 Na+); Fluid Consistency: Thin (IDDSI 0)    Undress & Gown    Vital Signs and Check distal extremity for warmth, color, sensation and pulses with each vital sign and site check.    Maintain IV Access    Telemetry - Place Orders & Notify Provider of Results When Patient Experiences Acute Chest Pain, Dysrhythmia or Respiratory Distress    Change site dressing    Encourage fluids    Strict intake and output    Continuous Pulse Oximetry    Advance Diet As Tolerated -    Notify MD if platelet count is less than 100,000, is less than 1/2 baseline, or if Hgb drops by more than 3mg/dl.    Notify MD of hypotension (SBP less than 95), bleeding, or dysrythmia and follow Sheath Removal Policy if needed.    Hold metFORMIN (GLUCOPHAGE) for 48 Hours    Closure Device Used    Assess Puncture Site, Vital Signs, Distal Pulses & Observe Site for Bleeding or Swelling    Head of Bed: Flat; 2 Hours; Elevate Head of Bed: 30 Degrees; Keep Affected Extremity/ Extremities Straight; Total Bedrest Time? 4 Hours    Code Status and Medical Interventions: CPR (Attempt to Resuscitate); Full Support    Oxygen Therapy- Nasal Cannula; Titrate 1-6 LPM Per SpO2; 90 - 95%    Oxygen Therapy- Nasal Cannula; 2 LPM    POC Activated Clotting Time    POC Activated Clotting Time    ECG 12 Lead ED Triage Standing Order; Chest Pain    ECG 12 Lead ED Triage Standing Order; Chest Pain    ECG 12 Lead Other; STEMI s/p PCI    ECG 12 Lead Other; STEMI s/p PCI    Insert Peripheral IV    Inpatient Admission    CBC & Differential     Green Top (Gel)    Lavender Top    Gold Top - SST    Light Blue Top    Extra Tubes    Gray Top    CBC & Differential       Medications Given in the Emergency Department:  Medications   sodium chloride 0.9 % flush 10 mL (has no administration in time range)   metoprolol succinate XL (TOPROL-XL) 24 hr tablet 100 mg (has no administration in time range)   montelukast (SINGULAIR) tablet 10 mg (has no administration in time range)   pantoprazole (PROTONIX) EC tablet 40 mg (has no administration in time range)   nitroglycerin (NITROSTAT) SL tablet 0.4 mg (has no administration in time range)   sodium chloride 0.9 % infusion (0 mL/hr Intravenous Stopped 4/29/25 2052)   acetaminophen (TYLENOL) tablet 650 mg (has no administration in time range)   morphine injection 1 mg (has no administration in time range)     And   naloxone (NARCAN) injection 0.4 mg (has no administration in time range)   ondansetron ODT (ZOFRAN-ODT) disintegrating tablet 4 mg (has no administration in time range)     Or   ondansetron (ZOFRAN) injection 4 mg (has no administration in time range)   aspirin chewable tablet 81 mg (has no administration in time range)   ticagrelor (BRILINTA) tablet 90 mg (90 mg Oral Given 4/29/25 2048)   hydrALAZINE (APRESOLINE) injection 20 mg (20 mg Intravenous Given 4/29/25 1812)   losartan (COZAAR) tablet 25 mg (25 mg Oral Given 4/29/25 1812)   tamsulosin (FLOMAX) 24 hr capsule 0.4 mg (has no administration in time range)   atorvastatin (LIPITOR) tablet 40 mg (has no administration in time range)   aspirin chewable tablet 324 mg (324 mg Oral Given 4/29/25 1737)   atorvastatin (LIPITOR) tablet (40 mg Oral Given 4/29/25 1506)   heparin (porcine) 5000 UNIT/ML injection (6,120 Units Intravenous Given 4/29/25 1507)        ED Course:         Labs:    Lab Results (last 24 hours)       Procedure Component Value Units Date/Time    High Sensitivity Troponin T [[id number]]  (Normal) Collected: 04/29/25 1506    Specimen: Blood  Updated: 04/29/25 1529     HS Troponin T 20 ng/L     Narrative:      High Sensitive Troponin T Reference Range:  <14.0 ng/L- Negative Female for AMI  <22.0 ng/L- Negative Male for AMI  >=14 - Abnormal Female indicating possible myocardial injury.  >=22 - Abnormal Male indicating possible myocardial injury.   Clinicians would have to utilize clinical acumen, EKG, Troponin, and serial changes to determine if it is an Acute Myocardial Infarction or myocardial injury due to an underlying chronic condition.         CBC & Differential [[id number]]  (Abnormal) Collected: 04/29/25 1506    Specimen: Blood Updated: 04/29/25 1509    Narrative:      The following orders were created for panel order CBC & Differential.  Procedure                               Abnormality         Status                     ---------                               -----------         ------                     CBC Auto Differential[[id number]]        Abnormal            Final result                 Please view results for these tests on the individual orders.    Comprehensive Metabolic Panel [[id number]]  (Abnormal) Collected: 04/29/25 1506    Specimen: Blood Updated: 04/29/25 1529     Glucose 103 mg/dL      BUN 19 mg/dL      Creatinine 1.03 mg/dL      Sodium 141 mmol/L      Potassium 3.6 mmol/L      Comment: Slight hemolysis detected by analyzer. Result may be falsely elevated.        Chloride 104 mmol/L      CO2 21.7 mmol/L      Calcium 9.1 mg/dL      Total Protein 7.3 g/dL      Albumin 4.5 g/dL      ALT (SGPT) 27 U/L      AST (SGOT) 27 U/L      Alkaline Phosphatase 77 U/L      Total Bilirubin 0.7 mg/dL      Globulin 2.8 gm/dL      A/G Ratio 1.6 g/dL      BUN/Creatinine Ratio 18.4     Anion Gap 15.3 mmol/L      eGFR 81.1 mL/min/1.73     Narrative:      GFR Categories in Chronic Kidney Disease (CKD)      GFR Category          GFR (mL/min/1.73)    Interpretation  G1                     90 or greater         Normal or high (1)  G2                       60-89                Mild decrease (1)  G3a                   45-59                Mild to moderate decrease  G3b                   30-44                Moderate to severe decrease  G4                    15-29                Severe decrease  G5                    14 or less           Kidney failure          (1)In the absence of evidence of kidney disease, neither GFR category G1 or G2 fulfill the criteria for CKD.    eGFR calculation 2021 CKD-EPI creatinine equation, which does not include race as a factor    Lipase [[id number]]  (Normal) Collected: 04/29/25 1506    Specimen: Blood Updated: 04/29/25 1529     Lipase 25 U/L     BNP [[id number]]  (Normal) Collected: 04/29/25 1506    Specimen: Blood Updated: 04/29/25 1526     proBNP 68.1 pg/mL     Narrative:      This assay is used as an aid in the diagnosis of individuals suspected of having heart failure. It can be used as an aid in the diagnosis of acute decompensated heart failure (ADHF) in patients presenting with signs and symptoms of ADHF to the emergency department (ED). In addition, NT-proBNP of <300 pg/mL indicates ADHF is not likely.    Age Range Result Interpretation  NT-proBNP Concentration (pg/mL:      <50             Positive            >450                   Gray                 300-450                    Negative             <300    50-75           Positive            >900                  Gray                300-900                  Negative            <300      >75             Positive            >1800                  Gray                300-1800                  Negative            <300    Magnesium [[id number]]  (Normal) Collected: 04/29/25 1506    Specimen: Blood Updated: 04/29/25 1529     Magnesium 2.3 mg/dL     CBC Auto Differential [[id number]]  (Abnormal) Collected: 04/29/25 1506    Specimen: Blood Updated: 04/29/25 1509     WBC 9.07 10*3/mm3      RBC 4.76 10*6/mm3      Hemoglobin 15.5 g/dL      Hematocrit 43.5 %      MCV 91.4 fL      MCH 32.6 pg       MCHC 35.6 g/dL      RDW 12.7 %      RDW-SD 42.1 fl      MPV 9.1 fL      Platelets 231 10*3/mm3      Neutrophil % 72.9 %      Lymphocyte % 15.7 %      Monocyte % 8.5 %      Eosinophil % 2.1 %      Basophil % 0.4 %      Immature Grans % 0.4 %      Neutrophils, Absolute 6.61 10*3/mm3      Lymphocytes, Absolute 1.42 10*3/mm3      Monocytes, Absolute 0.77 10*3/mm3      Eosinophils, Absolute 0.19 10*3/mm3      Basophils, Absolute 0.04 10*3/mm3      Immature Grans, Absolute 0.04 10*3/mm3      nRBC 0.0 /100 WBC     CK [[id number]]  (Abnormal) Collected: 25 150    Specimen: Blood Updated: 25 173     Creatine Kinase 263 U/L     CK-MB [[id number]]  (Normal) Collected: 25 150    Specimen: Blood Updated: 25     CKMB 4.20 ng/mL     Narrative:      Results may be falsely decreased if patient taking Biotin.      POC Activated Clotting Time [[id number]]  (Abnormal) Collected: 25 161    Specimen: Blood Updated: 25 161     Activated Clotting Time  273 Seconds      Comment: Serial Number: [id number]:  [id number]       High Sensitivity Troponin T 1Hr [[id number]]  (Abnormal) Collected: 25 1728    Specimen: Blood from Hand, Left Updated: 25 1834     HS Troponin T 255 ng/L      Troponin T Numeric Delta 235 ng/L     Narrative:      High Sensitive Troponin T Reference Range:  <14.0 ng/L- Negative Female for AMI  <22.0 ng/L- Negative Male for AMI  >=14 - Abnormal Female indicating possible myocardial injury.  >=22 - Abnormal Male indicating possible myocardial injury.   Clinicians would have to utilize clinical acumen, EKG, Troponin, and serial changes to determine if it is an Acute Myocardial Infarction or myocardial injury due to an underlying chronic condition.                  Imaging:    Cardiac Catheterization/Vascular Study  Result Date: 2025  [facility] CARDIAC CATHETERIZATION PROCEDURE REPORT Patient: Christiano Barr : 1960 MRN: [id number]  Procedure Date: 04/29/25 Referring Physician: Fredy Michaud MD Interventional Cardiologist: Larui Wyatt MD Indication: Inferior ST segment elevation myocardial infarction Clinical Presentation: Mr. Barr presented to the emergency room in the evening because of chest pain since 3 AM today.  The pain eventually got intense and unrelenting, hence EMS was called.  EKGs done by EMS and also the emergency room showed ST segment elevation in inferior leads with reciprocal changes.  He received IV heparin, aspirin in the emergency room.  He was transferred to Cath Lab for emergent Cardiac catheterization and primary angioplasty if indicated. Procedure performed: Diagnostic Left Heart Catheterization Coronary Angiography Left Ventriculography Successful percutaneous coronary intervention to right PLV branch using 2.25 x 18 Xience Skypoint drug-eluting stent, postdilated using 2.5 noncompliant balloon with good angiographic results Access Site(s): Right radial artery Right common femoral artery Findings: 1. Coronary Artery Anatomy: Dominance: Right Left Main: Normal with no stenosis Left Anterior Descending artery: Proximal LAD artery has luminal irregularities.  Mid LAD has a lesion which is angiographically 40% severity.  There is a second lesion in the mid LAD artery which is angiographically 70% in severity.  D1 branch is a large vessel (larger than LAD proper) with multiple subbranches.  Is free of any significant stenosis Left Circumflex Artery: Nondominant vessel giving rise to 4 OM branches.  OM1 and OM 2 are normal.  OM 3 has an 80% lesion in its proximal segment.  This is a small vessel.  Terminal OM is a small vessel and is free of any stenosis.  LCx artery has luminal irregularities but no angiographically significant lesions. Right Coronary Artery: Large dominant vessel giving rise to right PDA and PLV branches.  Proximal to distal RCA has no significant lesions.  Right PDA is free of any stenosis.   Right PLV branch is 100% occluded in its proximal segment.  This appears to be the culprit lesion for patient's presentation with inferior STEMI.  Distal segment of this branch is noted to be filled by left-to-right collaterals on antegrade injections of left main coronary artery. 2. Hemodynamics:    The opening aortic pressure is 180/72 with a mean of 94 mmHg. The left ventricular end-diastolic pressure is 14 mmHg. There was no gradient across aortic valve on pullback The closing aortic pressure is 146/63 with a mean of 97 mmHg  3. Left Ventriculogram: Ejection Fraction: 50% Wall Motion: There is mild basal to mid inferior wall hypokinesis. Mitral Regurgitation: No significant mitral regurgitation noted 4. Percutaneous Intervention: Location: Right PLV branch Treatment: ÓSCAR placement Pre-stenosis: 100% Post-stenosis: 0% Lesion Type C: Yes AYAN Flow Pre: 0 AYAN Flow Post: 3 Bifurcation: No Severe Calcium: No Dissection: No Conclusions: 100% occlusion of the right PLV branch, which is the culprit lesion for patient's presentation with acute inferior STEMI.  LAD artery has a borderline lesion in mid segment, which is 70% severity.  OM 3 branch has an 80% stenosis, which is a small vessel. Overall LV ejection fraction is 50% with mild basal to mid inferior wall hypokinesis. Normal left ventricular end-diastolic pressure Successful percutaneous coronary intervention to right PLV branch with placement of 2.25 x 18 Xience Skypoint drug-eluting stent, postdilated using 2.5 noncompliant balloon with good radiographic results. Recommendations: Continue dual antiplatelet therapy with aspirin and Brilinta High intensity statins and other measures of secondary prevention Further management in the intensive care unit Procedure Status: Emergent Details of the procedure: Informed consent was obtained with an explanation of the risks, benefits and alternatives of the procedure. The patient was brought to the Cardiac Catheterization  Laboratory and was prepped and draped in a standard sterile fashion. Moderate sedation with Fentanyl and Versed was administered by the circulating nurse. Lidocaine 2% was used to anesthetize the right radial artery and a 5/6 Slender sheath was placed.  We advanced a 6 Romanian Ikari 3.75 guide catheter over a wire.  Significant tortuosity of right subclavian vessel was noted.  We were able to advance the catheter into the ascending aorta over a wire.  However coronary arteries could not be engaged due to the tortuosity involving the subclavian vessels.  The guide catheter was taken out of the body over a wire.  The radial artery sheath was pulled and TR band was applied for hemostasis. Next, the right inguinal region was prepared, cleaned and draped in usual sterile fashion.  2% lidocaine was used to anesthetize area.  Using an 18-gauge Cook needle, right common femoral artery was cannulated and a 6 Romanian arterial sheath was advanced by modified Seldinger technique.  A 5 Romanian JL 4 diagnostic catheter was used to engage the ostium of left main coronary artery.  A 6 Romanian 3 DRC guide catheter was used to successfully engage the ostium of right coronary artery.  Diagnostic angiography was performed by injection of nonionic contrast in all appropriate projections.  We found 100% occlusion of the right PLV branch, hence proceeded with angioplasty.  Please see below for details of angioplasty.  A 6 Romanian pigtail catheter was used to cross the aortic valve.  Left ventricular hemodynamics were documented.  Left ventriculogram was performed injection of nonionic contrast in BRINK projection.  Gradient across aortic valve was documented by pullback technique. Details of angioplasty: A 6 Romanian 3 DRC guide catheter was used to successfully engage the ostium of right coronary artery.  BMW interventional wire was advanced without difficulty, through the RCA into the occlusion of the right PLV branch.  The lesion was predilated  using 2.25 x 12 noncompliant balloon to maximum pressure of 12 opal.  Multiple inflations were performed.  Next angiogram showed AYAN-3 flow in the entire PLV branch.  The lesion was stented from normal to normal segment using 2.25 x 18 Xience wily point drug-eluting stent.  Stent was deployed at 12 opal.  Next, the stent was postdilated using 2.5 x 12 noncompliant balloon.  The max inflation pressure was 16 opal at the midportion of the stent.  The post dilating balloon was removed and final angiogram was performed in multiple projections, before and after removing the interventional wire.  There was no dissection, perforation or distal embolization.  The guide catheter was taken out of the body over a wire. Heparin was used for anticoagulation throughout the procedure with frequent checking of ACT.  Patient was already loaded with aspirin in the emergency room.  He was loaded with 180 mg of Brilinta at the beginning of angioplasty.  At the end of the procedure, a limited right femoral angiogram was performed by injection through the arterial sheath.  Appropriate arteriotomy site was noted.  The arteriotomy site was closed using a 6 Polish Angio-Seal closure device.  Patient tolerated procedure well without any complications.  The results of the test were explained in detail to the patient and multiple family members. Cumulative fluoroscopy time: 20.5 min Cumulative air kerma: 1241 mGy Total amount of contrast used: 260 ml of Isovue Complications: None. Estimated Blood Loss: 15 mL Lauri Wyatt MD 04/29/25 17:16 EDT     XR Chest 1 View  Result Date: 4/29/2025  XR CHEST 1 VW Date of Exam: 4/29/2025 3:11 PM EDT Indication: Chest Pain Triage Protocol Comparison: CXR 1/11/2018 Findings: The heart is normal in size. Low lung volumes are evident. No airspace disease or consolidation is evident. Bony structures appear intact.     Impression: Low lung volumes. No active disease is seen. Electronically Signed: Turner  MD Yolanda  4/29/2025 3:27 PM EDT  Workstation ID: [id number]        Differential Diagnosis and Discussion:    Chest Pain:  Based on the patient's signs and symptoms, I considered aortic dissection, myocardial infaction, pulmonary embolism, cardiac tamponade, pericarditis, pneumothorax, musculoskeletal chest pain and other differential diagnosis as an etiology of the patient's chest pain.     PROCEDURES:    Labs were collected in the emergency department and all labs were reviewed and interpreted by me.  X-ray were performed in the emergency department and all X-ray impressions were independently interpreted by me.  An EKG was performed and the EKG was interpreted by me.    ECG 12 Lead Other; STEMI s/p PCI   Preliminary Result   HEART RATE=79  bpm   RR Aegnmwnm=150  ms   NV Ctqctlru=247  ms   P Horizontal Axis=41  deg   P Front Axis=44  deg   QRSD Interval=91  ms   QT Ouwryifj=002  ms   ZMmI=034  ms   QRS Axis=-29  deg   T Wave Axis=64  deg   - ABNORMAL ECG -   Sinus rhythm   Prolonged NV interval   Probable LVH with secondary repol abnrm   Date and Time of Study:2025-04-29 19:21:10      ECG 12 Lead ED Triage Standing Order; Chest Pain   Preliminary Result   HEART RATE=51  bpm   RR Ujnjlmog=0668  ms   NV Eqiexlnc=576  ms   P Horizontal Axis=3  deg   P Front Axis=26  deg   QRSD Ymjjikhj=251  ms   QT Easelsvl=032  ms   MWxM=006  ms   QRS Axis=0  deg   T Wave Axis=74  deg   - ABNORMAL ECG -   Sinus bradycardia   Atrial premature complex   Probable left ventricular hypertrophy   Inferior infarct, acute (RCA)   Date and Time of Study:2025-04-29 14:57:31        My interpretation of the EKG shows ST elevation MI, normal QT, normal rate    Procedures    MDM  Number of Diagnoses or Management Options  ST elevation myocardial infarction (STEMI), unspecified artery  Diagnosis management comments: In summary this is a 64-year-old male patient who presents to the emerged from for evaluation of chest pain and was found to have  STEMI.  He was sent to Cath Lab.  CBC independently reviewed and interpreted by me and shows no critical abnormalities.  CMP independently reviewed and interpreted by me and shows no critical abnormalities.  Patient case has been discussed with the interventional cardiology team who will take patient to Cath Lab.             Patient was placed on the cardiac monitor after being given IV heparin.  They were monitored for ventricular ectopy, arrhythmia, tachycardia, hypoxia, and changes in blood pressure.  Patient was rechecked several times throughout their stay for mental status decline and for reassessment of worsening changes in vital signs.     Total Critical Care time of 35 minutes. Total critical care time documented does not include time spent on separately billed procedures for services of nurses or physician assistants. I personally saw and examined the patient. I have reviewed all diagnostic interpretations and treatment plans as written. I was present for the key portions of any procedures performed and the inclusive time noted in any critical care statement. Critical care time includes patient management by me, time spent at the patients bedside,  time to review lab and imaging results, discussing patient care, documentation in the medical record, and time spent with family or caregiver.          Patient Care Considerations:    CT CHEST: I considered ordering a CT scan of the chest, however EKG clearly shows STEMI      Consultants/Shared Management Plan:    Consultant: I have discussed the case with Dr. Wyatt who states activate Cath Lab    Social Determinants of Health:    Patient is independent, reliable, and has access to care.       Disposition and Care Coordination:    Admit:   Through independent evaluation of the patient's history, physical, and imperical data, the patient meets criteria for inpatient admission to the hospital.        Final diagnoses:   ST elevation myocardial infarction (STEMI),  unspecified artery        ED Disposition       ED Disposition   Decision to Admit    Condition   --    Comment   Level of Care: Critical Care [6]   Diagnosis: STEMI (ST elevation myocardial infarction) [778651]   Certification: I Certify That Inpatient Hospital Services Are Medically Necessary For Greater Than 2 Midnights                 This medical record created using voice recognition software.             Fredy Michaud MD  04/29/25 2243

## 2025-04-29 NOTE — CONSULTS
Pulmonary / Critical Care Consult Note      Patient Name: Christiano Barr  : 1960  MRN: [id number]  Primary Care Physician:  Daniel Johnson MD  Referring Physician: Lauri Wyatt MD  Date of admission: 2025    Subjective   Subjective     Reason for Consult/ Chief Complaint: STEMI    HPI:  Christiano Barr is a 64 y.o. male with history of hypertension, hyperlipidemia, chronic left arm pain presented to the ED with complaints of chest pain.  Patient reported he initially felt nausea yesterday afternoon after eating an  tartar sauce and felt like that contributed to his indigestion however, symptoms persisted and worsened.  In the ED he was found to have blood pressure 172/72, he was on room air temperature 98.2.  ECG showed ST segment changes on the inferior wall concerning for STEMI.  Cath Lab team was activated he was taken to cardiac Cath Lab where he received PCI to PLV.  Of note patient did have some disease of the OM and LAD.  He was transferred to the ICU and our service was contacted for critical care management.        Personal History     Past Medical History:   Diagnosis Date    Acid reflux     Anxiety     Arthritis     OSTEO    Bradycardia     40's-50's NO CARDIOLOGIST NO MEDS    Congenital spondylolysis, lumbosacral region     Hyperlipidemia     Hypertension     Low back pain     Peripheral neuropathy     Pre-diabetes     NO MEDS    Risk factors for obstructive sleep apnea     YOAN 7    Rotator cuff tear     LEFT    Sleep apnea     instructed to bring cpap for surgery     Spinal stenosis of lumbar region with radiculopathy        Past Surgical History:   Procedure Laterality Date    APPENDECTOMY      INGUINAL HERNIA REPAIR Right     LUMBAR FUSION N/A 2022    Procedure: Open lumbar decompression with posterior lumbar interbody fusion with cages and pedicle scew/susan fixation lumbar four/five, lumbar five/sacral one using the [employer] robot;  Surgeon: Osman  MD Luis;  Location: [facility];  Service: Robotics - Neuro;  Laterality: N/A;    TOTAL KNEE ARTHROPLASTY Left 1/18/2018    Procedure: LT TOTAL KNEE ARTHROPLASTY;  Surgeon: Ralph Panchal MD;  Location: [facility];  Service:        Family History: family history is not on file. Otherwise pertinent FHx was reviewed and not pertinent to current issue.    Social History:  reports that he has never smoked. He has never used smokeless tobacco. He reports current alcohol use of about 8.0 standard drinks of alcohol per week. He reports that he does not use drugs.    Home Medications:  amLODIPine, hydroCHLOROthiazide, losartan-hydrochlorothiazide, meloxicam, metoprolol succinate XL, montelukast, pantoprazole, pravastatin, and tamsulosin    Allergies:  Allergies   Allergen Reactions    Doxycycline Photosensitivity       Objective    Objective     Vitals:   Temp:  [97.7 °F (36.5 °C)-98.8 °F (37.1 °C)] 98.6 °F (37 °C)  Heart Rate:  [54-91] 59  Resp:  [12-22] 18  BP: (145-195)/(63-96) 155/92  Flow (L/min) (Oxygen Therapy):  [2] 2    Physical Exam:  Vital Signs Reviewed   General: WDWN, Alert, NAD.    HEENT:  PERRL, EOMI.  OP, nares clear  Neck:  Supple, no JVD, no thyromegaly  Chest:  good aeration, clear to auscultation bilaterally, tympanic to percussion bilaterally, no work of breathing noted  CV: RRR, no MGR, pulses 2+, equal.  Abd:  Soft, NT, ND, + BS, no HSM  EXT:  no clubbing, no cyanosis, no edema  Neuro:  A&Ox3, CN grossly intact, no focal deficits.  Skin: No rashes or lesions noted      Result Review    Result Review:  I have personally reviewed the results from the time of this admission to 4/30/2025 14:55 EDT and agree with these findings:  [x]  Laboratory  []  Microbiology  [x]  Radiology  [x]  EKG/Telemetry   []  Cardiology/Vascular   []  Pathology  []  Old records  []  Other:  Most notable findings include:   \      Lab 04/30/25  0243 04/29/25  1506   WBC 8.25 9.07   HEMOGLOBIN 15.0 15.5   HEMATOCRIT  42.4 43.5   PLATELETS 205 231   SODIUM 137 141   POTASSIUM 3.4* 3.6   CHLORIDE 102 104   CO2 21.6* 21.7*   BUN 12 19   CREATININE 0.87 1.03   GLUCOSE 104* 103*   CALCIUM 9.0 9.1   PHOSPHORUS 3.6  --    TOTAL PROTEIN 6.8 7.3   ALBUMIN 3.9 4.5   GLOBULIN 2.9 2.8     Chest x-ray with clear lung fields  EKG with inferior STEMI      Assessment & Plan   Assessment / Plan     Active Hospital Problems:  Active Hospital Problems    Diagnosis     **ST elevation myocardial infarction involving right coronary artery      Impression:  Inferior STEMI status post PCI  Chest pain  Coronary artery disease  Essential hypertension  Hyperlipidemia  Obstructive sleep apnea  Class I obesity BMI 32.9  Hyperglycemia    Plan:  Wean O2 to keep SPO2 greater than 90%  Continue postcardiac cath care with PCI per cardiology in ICU  Check echocardiogram  Continue Brilinta and aspirin  Check lipid panel and A1c  Will need cardiac rehab after discharge  Continue TR band in place and Angio-Seal to right groin  Trend renal panel and electrolytes  Advance diet as tolerated    VTE Prophylaxis:  No VTE prophylaxis order currently exists.    Code Status and Medical Interventions: CPR (Attempt to Resuscitate); Full Support   Ordered at: 04/29/25 1714     Code Status (Patient has no pulse and is not breathing):    CPR (Attempt to Resuscitate)     Medical Interventions (Patient has pulse or is breathing):    Full Support     Level Of Support Discussed With:    Patient        Patient is critically ill with STEMI status post PCI. We have personally reviewed all pertinent labs, imaging, microbiology and documentation. We have discussed care with the primary service as well as at multidisciplinary critical care rounds with the bedside nurse, respiratory therapist, pharmacist and all other ancillary services. 31 minutes of critical care time was spent managing this patient, excluding procedures. Of this time, I spent 21 minutes in accordance with split shared  ronda.    I, Mamie Shipman, APRN, spent 10 minutes critical care time.

## 2025-04-30 ENCOUNTER — APPOINTMENT (OUTPATIENT)
Dept: CARDIOLOGY | Facility: HOSPITAL | Age: 65
End: 2025-04-30
Payer: COMMERCIAL

## 2025-04-30 LAB
ALBUMIN SERPL-MCNC: 3.9 G/DL (ref 3.5–5.2)
ALBUMIN/GLOB SERPL: 1.3 G/DL
ALP SERPL-CCNC: 70 U/L (ref 39–117)
ALT SERPL W P-5'-P-CCNC: 30 U/L (ref 1–41)
ANION GAP SERPL CALCULATED.3IONS-SCNC: 13.4 MMOL/L (ref 5–15)
AORTIC DIMENSIONLESS INDEX: 0.9 (DI)
ASCENDING AORTA: 3 CM
AST SERPL-CCNC: 62 U/L (ref 1–40)
AV MEAN PRESS GRAD SYS DOP V1V2: 3.6 MMHG
AV VMAX SYS DOP: 129.8 CM/SEC
BASOPHILS # BLD AUTO: 0.03 10*3/MM3 (ref 0–0.2)
BASOPHILS NFR BLD AUTO: 0.4 % (ref 0–1.5)
BH CV ECHO MEAS - AO MAX PG: 6.7 MMHG
BH CV ECHO MEAS - AO ROOT DIAM: 2.9 CM
BH CV ECHO MEAS - AO V2 VTI: 27.2 CM
BH CV ECHO MEAS - AVA(I,D): 3.4 CM2
BH CV ECHO MEAS - EDV(MOD-SP2): 80.6 ML
BH CV ECHO MEAS - EDV(MOD-SP4): 72.1 ML
BH CV ECHO MEAS - EF(MOD-SP2): 65.6 %
BH CV ECHO MEAS - EF(MOD-SP4): 53.8 %
BH CV ECHO MEAS - ESV(MOD-SP2): 27.7 ML
BH CV ECHO MEAS - ESV(MOD-SP4): 33.3 ML
BH CV ECHO MEAS - IVS/LVPW: 1.11 CM
BH CV ECHO MEAS - IVSD: 1 CM
BH CV ECHO MEAS - LA DIMENSION: 4.4 CM
BH CV ECHO MEAS - LAT PEAK E' VEL: 10.3 CM/SEC
BH CV ECHO MEAS - LV DIASTOLIC VOL/BSA (35-75): 35.2 CM2
BH CV ECHO MEAS - LV MAX PG: 5.8 MMHG
BH CV ECHO MEAS - LV MEAN PG: 2.4 MMHG
BH CV ECHO MEAS - LV SYSTOLIC VOL/BSA (12-30): 16.3 CM2
BH CV ECHO MEAS - LV V1 MAX: 120 CM/SEC
BH CV ECHO MEAS - LV V1 VTI: 24.6 CM
BH CV ECHO MEAS - LVIDD: 4.3 CM
BH CV ECHO MEAS - LVIDS: 3.4 CM
BH CV ECHO MEAS - LVOT AREA: 3.8 CM2
BH CV ECHO MEAS - LVOT DIAM: 2.2 CM
BH CV ECHO MEAS - LVPWD: 0.9 CM
BH CV ECHO MEAS - MV A MAX VEL: 63.9 CM/SEC
BH CV ECHO MEAS - MV E MAX VEL: 83.3 CM/SEC
BH CV ECHO MEAS - MV E/A: 1.3
BH CV ECHO MEAS - RAP SYSTOLE: 3 MMHG
BH CV ECHO MEAS - RVSP: 31 MMHG
BH CV ECHO MEAS - SV(LVOT): 93.5 ML
BH CV ECHO MEAS - SV(MOD-SP2): 52.9 ML
BH CV ECHO MEAS - SV(MOD-SP4): 38.8 ML
BH CV ECHO MEAS - SVI(LVOT): 45.7 ML/M2
BH CV ECHO MEAS - SVI(MOD-SP2): 25.9 ML/M2
BH CV ECHO MEAS - SVI(MOD-SP4): 19 ML/M2
BH CV ECHO MEAS - TAPSE (>1.6): 1.99 CM
BH CV ECHO MEAS - TR MAX PG: 28.3 MMHG
BH CV ECHO MEAS - TR MAX VEL: 265.8 CM/SEC
BH CV XLRA - RV BASE: 4.6 CM
BH CV XLRA - RV LENGTH: 8.6 CM
BH CV XLRA - RV MID: 4.4 CM
BH CV XLRA - TDI S': 11.4 CM/SEC
BILIRUB SERPL-MCNC: 0.9 MG/DL (ref 0–1.2)
BUN SERPL-MCNC: 12 MG/DL (ref 8–23)
BUN/CREAT SERPL: 13.8 (ref 7–25)
CALCIUM SPEC-SCNC: 9 MG/DL (ref 8.6–10.5)
CHLORIDE SERPL-SCNC: 102 MMOL/L (ref 98–107)
CHOLEST SERPL-MCNC: 152 MG/DL (ref 0–200)
CK MB SERPL-CCNC: 29.26 NG/ML
CK SERPL-CCNC: 588 U/L (ref 20–200)
CO2 SERPL-SCNC: 21.6 MMOL/L (ref 22–29)
CREAT SERPL-MCNC: 0.87 MG/DL (ref 0.76–1.27)
DEPRECATED RDW RBC AUTO: 42 FL (ref 37–54)
EGFRCR SERPLBLD CKD-EPI 2021: 96.4 ML/MIN/1.73
EOSINOPHIL # BLD AUTO: 0.17 10*3/MM3 (ref 0–0.4)
EOSINOPHIL NFR BLD AUTO: 2.1 % (ref 0.3–6.2)
ERYTHROCYTE [DISTWIDTH] IN BLOOD BY AUTOMATED COUNT: 12.8 % (ref 12.3–15.4)
GLOBULIN UR ELPH-MCNC: 2.9 GM/DL
GLUCOSE SERPL-MCNC: 104 MG/DL (ref 65–99)
HBA1C MFR BLD: 5.6 % (ref 4.8–5.6)
HCT VFR BLD AUTO: 42.4 % (ref 37.5–51)
HDLC SERPL-MCNC: 39 MG/DL (ref 40–60)
HGB BLD-MCNC: 15 G/DL (ref 13–17.7)
IMM GRANULOCYTES # BLD AUTO: 0.03 10*3/MM3 (ref 0–0.05)
IMM GRANULOCYTES NFR BLD AUTO: 0.4 % (ref 0–0.5)
IVRT: 89 MS
LDLC SERPL CALC-MCNC: 92 MG/DL (ref 0–100)
LDLC/HDLC SERPL: 2.29 {RATIO}
LEFT ATRIUM VOLUME INDEX: 39.2 ML/M2
LYMPHOCYTES # BLD AUTO: 1.15 10*3/MM3 (ref 0.7–3.1)
LYMPHOCYTES NFR BLD AUTO: 13.9 % (ref 19.6–45.3)
MAGNESIUM SERPL-MCNC: 1.9 MG/DL (ref 1.6–2.4)
MCH RBC QN AUTO: 31.7 PG (ref 26.6–33)
MCHC RBC AUTO-ENTMCNC: 35.4 G/DL (ref 31.5–35.7)
MCV RBC AUTO: 89.6 FL (ref 79–97)
MONOCYTES # BLD AUTO: 0.8 10*3/MM3 (ref 0.1–0.9)
MONOCYTES NFR BLD AUTO: 9.7 % (ref 5–12)
NEUTROPHILS NFR BLD AUTO: 6.07 10*3/MM3 (ref 1.7–7)
NEUTROPHILS NFR BLD AUTO: 73.5 % (ref 42.7–76)
NRBC BLD AUTO-RTO: 0 /100 WBC (ref 0–0.2)
PHOSPHATE SERPL-MCNC: 3.6 MG/DL (ref 2.5–4.5)
PLATELET # BLD AUTO: 205 10*3/MM3 (ref 140–450)
PMV BLD AUTO: 8.8 FL (ref 6–12)
POTASSIUM SERPL-SCNC: 3.4 MMOL/L (ref 3.5–5.2)
PROT SERPL-MCNC: 6.8 G/DL (ref 6–8.5)
QT INTERVAL: 366 MS
QT INTERVAL: 396 MS
QTC INTERVAL: 410 MS
QTC INTERVAL: 421 MS
RBC # BLD AUTO: 4.73 10*6/MM3 (ref 4.14–5.8)
SODIUM SERPL-SCNC: 137 MMOL/L (ref 136–145)
TRIGL SERPL-MCNC: 118 MG/DL (ref 0–150)
TROPONIN T SERPL HS-MCNC: 945 NG/L
TSH SERPL DL<=0.05 MIU/L-ACNC: 2.43 UIU/ML (ref 0.27–4.2)
VLDLC SERPL-MCNC: 21 MG/DL (ref 5–40)
WBC NRBC COR # BLD AUTO: 8.25 10*3/MM3 (ref 3.4–10.8)

## 2025-04-30 PROCEDURE — 83735 ASSAY OF MAGNESIUM: CPT | Performed by: INTERNAL MEDICINE

## 2025-04-30 PROCEDURE — 84443 ASSAY THYROID STIM HORMONE: CPT | Performed by: INTERNAL MEDICINE

## 2025-04-30 PROCEDURE — 94761 N-INVAS EAR/PLS OXIMETRY MLT: CPT

## 2025-04-30 PROCEDURE — 82550 ASSAY OF CK (CPK): CPT | Performed by: INTERNAL MEDICINE

## 2025-04-30 PROCEDURE — 84484 ASSAY OF TROPONIN QUANT: CPT | Performed by: INTERNAL MEDICINE

## 2025-04-30 PROCEDURE — 94799 UNLISTED PULMONARY SVC/PX: CPT

## 2025-04-30 PROCEDURE — 80053 COMPREHEN METABOLIC PANEL: CPT | Performed by: INTERNAL MEDICINE

## 2025-04-30 PROCEDURE — 80061 LIPID PANEL: CPT | Performed by: INTERNAL MEDICINE

## 2025-04-30 PROCEDURE — 25510000001 PERFLUTREN 6.52 MG/ML SUSPENSION 2 ML VIAL: Performed by: INTERNAL MEDICINE

## 2025-04-30 PROCEDURE — 85025 COMPLETE CBC W/AUTO DIFF WBC: CPT | Performed by: INTERNAL MEDICINE

## 2025-04-30 PROCEDURE — 93005 ELECTROCARDIOGRAM TRACING: CPT | Performed by: INTERNAL MEDICINE

## 2025-04-30 PROCEDURE — 82553 CREATINE MB FRACTION: CPT | Performed by: INTERNAL MEDICINE

## 2025-04-30 PROCEDURE — 93306 TTE W/DOPPLER COMPLETE: CPT | Performed by: INTERNAL MEDICINE

## 2025-04-30 PROCEDURE — 84100 ASSAY OF PHOSPHORUS: CPT | Performed by: PHYSICIAN ASSISTANT

## 2025-04-30 PROCEDURE — 93306 TTE W/DOPPLER COMPLETE: CPT

## 2025-04-30 PROCEDURE — 83036 HEMOGLOBIN GLYCOSYLATED A1C: CPT | Performed by: INTERNAL MEDICINE

## 2025-04-30 PROCEDURE — 99233 SBSQ HOSP IP/OBS HIGH 50: CPT | Performed by: INTERNAL MEDICINE

## 2025-04-30 PROCEDURE — 25010000002 MAGNESIUM SULFATE IN D5W 1G/100ML (PREMIX) 1-5 GM/100ML-% SOLUTION: Performed by: PHYSICIAN ASSISTANT

## 2025-04-30 RX ORDER — LOSARTAN POTASSIUM 50 MG/1
100 TABLET ORAL
Status: DISCONTINUED | OUTPATIENT
Start: 2025-04-30 | End: 2025-05-01 | Stop reason: HOSPADM

## 2025-04-30 RX ORDER — AMLODIPINE BESYLATE 5 MG/1
2.5 TABLET ORAL
Status: DISCONTINUED | OUTPATIENT
Start: 2025-04-30 | End: 2025-05-01 | Stop reason: HOSPADM

## 2025-04-30 RX ORDER — MAGNESIUM SULFATE 1 G/100ML
1 INJECTION INTRAVENOUS ONCE
Status: COMPLETED | OUTPATIENT
Start: 2025-04-30 | End: 2025-04-30

## 2025-04-30 RX ORDER — HYDROCHLOROTHIAZIDE 25 MG/1
25 TABLET ORAL DAILY
Status: DISCONTINUED | OUTPATIENT
Start: 2025-04-30 | End: 2025-05-01 | Stop reason: HOSPADM

## 2025-04-30 RX ORDER — POTASSIUM CHLORIDE 750 MG/1
40 CAPSULE, EXTENDED RELEASE ORAL ONCE
Status: COMPLETED | OUTPATIENT
Start: 2025-04-30 | End: 2025-04-30

## 2025-04-30 RX ORDER — LOSARTAN POTASSIUM 50 MG/1
25 TABLET ORAL NIGHTLY
Status: DISCONTINUED | OUTPATIENT
Start: 2025-04-30 | End: 2025-04-30

## 2025-04-30 RX ADMIN — TICAGRELOR 90 MG: 90 TABLET ORAL at 20:43

## 2025-04-30 RX ADMIN — Medication 10 ML: at 08:07

## 2025-04-30 RX ADMIN — PANTOPRAZOLE SODIUM 40 MG: 40 TABLET, DELAYED RELEASE ORAL at 08:07

## 2025-04-30 RX ADMIN — HYDROCHLOROTHIAZIDE 25 MG: 25 TABLET ORAL at 10:08

## 2025-04-30 RX ADMIN — POTASSIUM CHLORIDE 40 MEQ: 750 CAPSULE, EXTENDED RELEASE ORAL at 06:08

## 2025-04-30 RX ADMIN — METOPROLOL SUCCINATE 100 MG: 100 TABLET, EXTENDED RELEASE ORAL at 08:08

## 2025-04-30 RX ADMIN — MAGNESIUM SULFATE 1 G: 1 INJECTION INTRAVENOUS at 06:01

## 2025-04-30 RX ADMIN — PERFLUTREN 3 ML: 6.52 INJECTION, SUSPENSION INTRAVENOUS at 09:34

## 2025-04-30 RX ADMIN — ATORVASTATIN CALCIUM 40 MG: 40 TABLET, FILM COATED ORAL at 08:07

## 2025-04-30 RX ADMIN — TAMSULOSIN HYDROCHLORIDE 0.4 MG: 0.4 CAPSULE ORAL at 08:07

## 2025-04-30 RX ADMIN — ASPIRIN 81 MG: 81 TABLET, CHEWABLE ORAL at 08:07

## 2025-04-30 RX ADMIN — TICAGRELOR 90 MG: 90 TABLET ORAL at 08:07

## 2025-04-30 RX ADMIN — AMLODIPINE BESYLATE 2.5 MG: 5 TABLET ORAL at 08:07

## 2025-04-30 RX ADMIN — MONTELUKAST 10 MG: 10 TABLET, FILM COATED ORAL at 08:07

## 2025-04-30 RX ADMIN — LOSARTAN POTASSIUM 100 MG: 50 TABLET, FILM COATED ORAL at 10:08

## 2025-04-30 NOTE — PLAN OF CARE
Goal Outcome Evaluation:  Plan of Care Reviewed With: patient        Progress: improving  Outcome Evaluation: Patient transferred to our unit this afternoon from CCU, has had no complaints of CP and has remained in SR. Ambulating independently around the unit, no questions or concerns at this time. Plan of care continued, call light in reach.

## 2025-04-30 NOTE — PROGRESS NOTES
[facility]     Cardiology Progress Note    Patient Name: Christiano Barr  : 1960  MRN: [id number]  Primary Care Physician:  Daniel Johnson MD  Date of admission: 2025    Subjective   Subjective     Chief Complaint: Follow-up visit for chest pain, STEMI, status post primary angioplasty    Interval HPI:    Patient reports feeling fine today.  Chest pain completely subsided.  No shortness of breath, palpitations or dizziness.  Telemetry showing sinus rhythm with no arrhythmias.  Blood pressure remains elevated.    Review of Systems   All systems were reviewed and negative except for: Per HPI    Objective   Objective     Vitals:   Temp:  [97.7 °F (36.5 °C)-98.2 °F (36.8 °C)] 98 °F (36.7 °C)  Heart Rate:  [51-91] 69  Resp:  [12-22] 16  BP: (145-195)/(63-96) 181/81  Flow (L/min) (Oxygen Therapy):  [2] 2  Physical Exam      General : Alert, awake, no acute distress  CVS : Regular rate and rhythm, no murmur, rubs or gallops  Lungs: Clear to auscultation bilaterally, no crackles or rhonchi  Abdomen: Soft, nontender, bowel sounds heard in all 4 quadrants  Extremities: Warm, well-perfused, no pedal edema.  Skin bruising noted at the right inguinal region.  No palpable hematoma.  No audible bruit.  Right wrist free of hematoma.    Scheduled Meds:amLODIPine, 2.5 mg, Oral, Q24H  aspirin, 81 mg, Oral, Daily  atorvastatin, 40 mg, Oral, Daily  hydroCHLOROthiazide, 25 mg, Oral, Daily  losartan, 100 mg, Oral, Q24H  metoprolol succinate XL, 100 mg, Oral, Daily  montelukast, 10 mg, Oral, Daily  mupirocin, 1 Application, Each Nare, BID  pantoprazole, 40 mg, Oral, Daily  tamsulosin, 0.4 mg, Oral, Daily  ticagrelor, 90 mg, Oral, Q12H           Result Review    Result Review:  I have personally reviewed the results from the time of this admission to 2025 08:47 EDT and agree with these findings:  [x]  Laboratory  []  Microbiology  [x]  Radiology  [x]  EKG/Telemetry   [x]  Cardiology/Vascular   []   Pathology  []  Old records  []  Other:  Most notable findings include:     CBC          4/29/2025    15:06 4/30/2025    02:43   CBC   WBC 9.07  8.25    RBC 4.76  4.73    Hemoglobin 15.5  15.0    Hematocrit 43.5  42.4    MCV 91.4  89.6    MCH 32.6  31.7    MCHC 35.6  35.4    RDW 12.7  12.8    Platelets 231  205      CMP          4/29/2025    15:06 4/30/2025    02:43   CMP   Glucose 103  104    BUN 19  12    Creatinine 1.03  0.87    EGFR 81.1  96.4    Sodium 141  137    Potassium 3.6  3.4    Chloride 104  102    Calcium 9.1  9.0    Total Protein 7.3  6.8    Albumin 4.5  3.9    Globulin 2.8  2.9    Total Bilirubin 0.7  0.9    Alkaline Phosphatase 77  70    AST (SGOT) 27  62    ALT (SGPT) 27  30    Albumin/Globulin Ratio 1.6  1.3    BUN/Creatinine Ratio 18.4  13.8    Anion Gap 15.3  13.4       CARDIAC LABS:     Latest Reference Range & Units 04/29/25 15:06 04/29/25 17:28 04/30/25 02:43   Creatine Kinase 20 - 200 U/L 263 (H)  588 (H)   CKMB <=10.40 ng/mL 4.20  29.26 (H)   HS Troponin T <22 ng/L 20 255 (C) 945 (C)   Troponin T Numeric Delta Abnormal if >/=3 ng/L  235 (C)    proBNP 0.0 - 900.0 pg/mL 68.1           EKG done this morning showed sinus rhythm, normal axis, LVH by voltage criteria, ST segment elevation resolved in inferior leads    Assessment & Plan   Assessment / Plan     Brief Patient Summary:  Christiano Barr is a 64 y.o. male with hypertension, hyperlipidemia, chronic back pain, previous surgery, neuropathy of the lower extremities, obstructive sleep apnea and prediabetic state.  He presented because of chest pain of several hours duration and was noted to have acute inferior ST segment elevation myocardial infarction.     Active Hospital Problems:  Active Hospital Problems    Diagnosis    • **ST elevation myocardial infarction involving right coronary artery      Acute inferior ST segment elevation myocardial infarction : Culprit lesion is 100% occlusion of the PLV branch.  He underwent successful  primary angioplasty with stent placement.  LAD artery has borderline lesions and there is an 80% lesion involving small OM branch.  He is currently chest pain-free.  LVEF is 50% with mild basal to mid inferior wall hypokinesis     Essential hypertension : Blood pressure on the higher side  Prediabetic stage  Next hyperlipidemia, on pravastatin at home     Chronic back pain, previous back surgerie      Plan:     Continue aspirin, Brilinta  Home pravastatin changed to high intensity statins  Restarting home antihypertensives including hydrochlorothiazide, losartan and amlodipine  Continue metoprolol succinate 100 mg daily    Echocardiography to be done today to reevaluate LV function and valvular function  Transfer out of intensive care unit today  Repeating labs including cardiac markers a.m.       CODE STATUS:   Code Status (Patient has no pulse and is not breathing): CPR (Attempt to Resuscitate)  Medical Interventions (Patient has pulse or is breathing): Full Support  Level Of Support Discussed With: Patient      Electronically signed by Lauri Wyatt MD, 04/30/25, 8:47 AM EDT.

## 2025-04-30 NOTE — PROGRESS NOTES
Pulmonary / Critical Care Progress Note      Patient Name: Christiano Barr  : 1960  MRN: [id number]  Attending:  Lauri Wyatt MD  Date of admission: 2025    Subjective   Subjective   Follow-up for STEMI    Over past 24 hours: Patient taken to Cath Lab had pLAD PCI with chest pain and abnormal EKG, transferred to ICU for ongoing care    No acute events overnight, is having elevated blood pressure, medications resumed per cardiology    This morning patient is awake, no acute distress on room air, denies chest pain has chronic shoulder and arm pain    Objective   Objective     Vitals:   Temp:  [97.7 °F (36.5 °C)-98.2 °F (36.8 °C)] 98 °F (36.7 °C)  Heart Rate:  [51-91] 69  Resp:  [12-22] 16  BP: (145-195)/(63-96) 181/81  Flow (L/min) (Oxygen Therapy):  [2] 2    Physical Exam   Vital Signs Reviewed   General:  WDWN, Alert, NAD.    HEENT:  PERRL, EOMI.  OP, nares clear  Chest:  good aeration, clear to auscultation bilaterally, tympanic to percussion bilaterally, no work of breathing noted  CV: RRR, no MGR, pulses 2+, equal.  Abd:  Soft, NT, ND, + BS, no HSM  EXT:  no clubbing, no cyanosis, no edema  Neuro:  A&Ox3, CN grossly intact, no focal deficits.  Skin: No rashes or lesions noted      Result Review    Result Review:  I have personally reviewed the results from the time of this admission to 2025 09:46 EDT and agree with these findings:  [x]  Laboratory  []  Microbiology  []  Radiology  [x]  EKG/Telemetry   [x]  Cardiology/Vascular   []  Pathology  []  Old records  []  Other:  Most notable findings include:       Lab 25  0243 25  1506   WBC 8.25 9.07   HEMOGLOBIN 15.0 15.5   HEMATOCRIT 42.4 43.5   PLATELETS 205 231   SODIUM 137 141   POTASSIUM 3.4* 3.6   CHLORIDE 102 104   CO2 21.6* 21.7*   BUN 12 19   CREATININE 0.87 1.03   GLUCOSE 104* 103*   CALCIUM 9.0 9.1   PHOSPHORUS 3.6  --    TOTAL PROTEIN 6.8 7.3   ALBUMIN 3.9 4.5   GLOBULIN 2.9 2.8       Cardiac  Catheterization/Vascular Study       Conclusions:  100% occlusion of the right PLV branch, which is the culprit lesion for patient's presentation with acute inferior STEMI.  LAD artery has a borderline lesion in mid segment, which is 70% severity.  OM 3 branch has an 80% stenosis, which is a small vessel.  Overall LV ejection fraction is 50% with mild basal to mid inferior wall hypokinesis.  Normal left ventricular end-diastolic pressure  Successful percutaneous coronary intervention to right PLV branch with placement of 2.25 x 18 Xience Skypoint drug-eluting stent, postdilated using 2.5 noncompliant balloon with good radiographic results.    Recommendations:  Continue dual antiplatelet therapy with aspirin and Brilinta  High intensity statins and other measures of secondary prevention  Further management in the intensive care unit          Assessment & Plan   Assessment / Plan     Active Hospital Problems:  Active Hospital Problems    Diagnosis    • **ST elevation myocardial infarction involving right coronary artery      Impression:  Inferior STEMI status post PCI  Chest pain  Coronary artery disease  Essential hypertension  Hyperlipidemia  Obstructive sleep apnea  Class I obesity BMI 32.9  Hyperglycemia  Hypokalemia  Hypomagnesemia     Plan:  - On room air  - That is post PCI to PLV branch per cardiology  - Echo cardiogram with EF of 5 to 60% with  with mild basal inferior wall hypokinesis  - New aspirin, statin and Brilinta  - Can you Norvasc, losartan, HCTZ, metoprolol  - A1c 5.6  - Trend renal panel electrolytes.  Replace potassium orally magnesium IV  - Cardiac diet  - Rehab to evaluate    Okay to transfer out of ICU    VTE Prophylaxis:  No VTE prophylaxis order currently exists.    CODE STATUS:   Code Status (Patient has no pulse and is not breathing): CPR (Attempt to Resuscitate)  Medical Interventions (Patient has pulse or is breathing): Full Support  Level Of Support Discussed With: Patient      Labs,  imaging, microbiology, notes and medications personally reviewed  Discussed with primary    I, Dr. Palmer Qiu, have spent more than 50% of the total time managing the patient in this encounter today.  This included personally reviewing all pertinent labs, imaging, microbiology and documentation. Also discussing the case with the patient and any available family, the admitting physician and any available ancillary staff.    Electronically signed by HERO Greenwood, 04/30/25, 10:34 AM EDT.  Electronically signed by Palmer Qiu MD, 04/30/25, 2:59 PM EDT.

## 2025-04-30 NOTE — PAYOR COMM NOTE
"UR DEPARTMENT    Viky Hare RN  Phone [phone]  Fax [phone]    [place] [address]    NPI [id number]  TAX ID [id number]    PHYSICIAN NAME AND NPI  LAURI LAURA  [id number]    BED TYPE  INPATIENT CRITICAL CARE    TYPE OF ADMISSION: ED ADMISSION MEDICAL    ICD 10 CODE  I21.3    DATE OF ADMISSION 04/29/2025      Juliet Mattson (64 y.o. Male)       Date of Birth   1960    Social Security Number       Address   [address] [address]    Home Phone   [phone]    MRN   [id number]       [employer]    Marital Status                               Admission Date   4/29/2025    Admission Type   Emergency    Admitting Provider   Lauri Wyatt MD    Attending Provider   Lauri Wyatt MD    Department, Room/Bed   [facility] PROGRESSIVE CARE UNIT, 204/2       Discharge Date       Discharge Disposition       Discharge Destination                                 Attending Provider: Lauri Wyatt MD    Allergies: Doxycycline    Isolation: None   Infection: None   Code Status: CPR    Ht: 170.2 cm (67\")   Wt: 95.4 kg (210 lb 5.1 oz)    Admission Cmt: None   Principal Problem: ST elevation myocardial infarction involving right coronary artery [I21.11]                   Active Insurance as of 4/29/2025       Primary Coverage       Payor Plan Insurance Group Employer/Plan Group    [facility] [id number]       Payor Plan Address Payor Plan Phone Number Payor Plan Fax Number Effective Dates    [address] [address]   1/1/2021 - None Entered    Psychiatric [phone]         Subscriber Name Subscriber Birth Date Member ID       JULIET MATTSON 1960 [id number]                     Emergency Contacts        (Rel.) Home Phone Work Phone Mobile Phone    Nasima Mattson (Spouse) -- -- [phone]           Myocardial Infarction RRG Inpatient " Care       Indications Met   Last updated by Steph Gandhi on 4/29/2025 1752     Review Status Created By   Primary Completed Steph Gandhi      Criteria Review   Myocardial Infarction RRG Inpatient Care     Overall Determination: Indications Met     Criteria:  [×] Admission is indicated for  1 or more  of the following :      [×] Acute myocardial infarction (MI) [G] [H] (not in context of cardiac procedure within last 48 hours), as indicated by  ALL  of the following :          [×] Elevated cardiac troponin level, [I] [J] [K] [L] as indicated by  1 or more  of the following :              [×] New or presumed new elevation of cardiac troponin level and emergent intervention (eg, invasive coronary angiography, cardiac surgery) indicated (eg, suspected STEMI, treatment refractory myocardial ischemia or hypotension, ventricular rupture) [M]                  4/29/2025  5:51 PM                      -- 4/29/2025  5:51 PM by Steph Gandhi --                          STEMI - to cath lab from ED          [×] Myocardial injury due to acute ischemia, as indicated by  1 or more  of the following :              [×] Symptoms consistent with myocardial ischemia (eg, chest pain, dyspnea)                  4/29/2025  5:51 PM                      -- 4/29/2025  5:51 PM by Steph Gandhi --                          chest pain     Notes:  -- 4/29/2025  5:52 PM by Steph Gandhi --      Subject: Admission            *       Cath Lab            *       Conclusions:      1. 100% occlusion of the right PLV branch, which is the culprit lesion for patient's presentation with acute inferior STEMI. LAD artery has a borderline lesion in mid segment, which is 70% severity. OM 3 branch has an 80% stenosis, which is a small vessel.            *       Admit to ICU               [place]: NPI [id number] Tax ID [id number]     History & Physical        Lauri Wyatt MD at 04/29/25 6434           [facility]   CARDIOLOGY HISTORY  AND PHYSICAL    Patient Name: Christiano Barr  : 1960  MRN: [id number]  Primary Care Physician:  Daniel Johnson MD  Date of admission: 2025    Subjective  Subjective     Chief Complaint: Chest pain    HPI:    Christiano Barr is a 64 y.o. male with hypertension, hyperlipidemia, chronic back pain, previous surgery, neuropathy of the lower extremities, obstructive sleep apnea and prediabetic state.  He present to the emergency room because of chest pain of several hours duration.  He woke up around 3 AM because of severe heartburn which radiated to the back.  The symptoms eventually subsided but came back later with more intensity.  It later changed to a chest heaviness and pressure along with heartburn.  He had no shortness of breath, palpitations or dizziness.  EMS was called.  EKGs done in the ER showed ST segment elevations in the inferior leads.  He received aspirin loading along with IV heparin.  He was emergently transferred to cardiac Cath Lab.    On arrival to Cath Lab, patient is still reporting moderate chest pain, no significant improvement with nitroglycerin or aspirin.  Cardiac catheterization revealed 100% occlusion of the right PLV branch which appeared to be the culprit lesion.  He underwent successful angioplasty with stent placement.  LAD artery had borderline lesions in the midsegment along with tight lesion involving a small OM branch.  LV gram showed ejection fraction 50% with mild basal to mid inferior wall hypokinesis with a normal LVEDP.  He became chest pain-free at the end of the procedure.    He had no documented previous cardiac history.  He was seen by [facility] cardiology earlier this week for preoperative evaluation before gastric sleeve surgery.    Review of Systems   All systems were reviewed and negative except for: Per HPI    Personal History     Past Medical History:   Diagnosis Date   • Acid reflux    • Anxiety    • Arthritis     OSTEO    • Bradycardia     40's-50's NO CARDIOLOGIST NO MEDS   • Congenital spondylolysis, lumbosacral region    • Hyperlipidemia    • Hypertension    • Low back pain    • Peripheral neuropathy    • Pre-diabetes     NO MEDS   • Risk factors for obstructive sleep apnea     YOAN 7   • Rotator cuff tear     LEFT   • Sleep apnea     instructed to bring cpap for surgery    • Spinal stenosis of lumbar region with radiculopathy        Family History: Reviewed, no family history of premature coronary artery disease    Social History:  reports that he has never smoked. He has never used smokeless tobacco. He reports current alcohol use of about 8.0 standard drinks of alcohol per week. He reports that he does not use drugs.    Home Medications:  amLODIPine, hydroCHLOROthiazide, losartan-hydrochlorothiazide, meloxicam, metoprolol succinate XL, montelukast, pantoprazole, pravastatin, and tamsulosin      Allergies:  Allergies   Allergen Reactions   • Doxycycline Photosensitivity       Objective  Objective     Vitals:   Temp:  [98.2 °F (36.8 °C)] 98.2 °F (36.8 °C)  Heart Rate:  [51] 51  Resp:  [17] 17  BP: (172)/(72) 172/72  Flow (L/min) (Oxygen Therapy):  [2] 2  Physical Exam    Constitutional: Awake, alert, in moderate distress   Eyes: PERRLA, sclerae anicteric, no conjunctival injection   HENT: NCAT, mucous membranes moist   Neck: Supple, no thyromegaly, no lymphadenopathy, trachea midline   Respiratory: Clear to auscultation bilaterally, nonlabored respirations    Cardiovascular: RRR, no murmurs, rubs, or gallops, palpable pedal pulses bilaterally   Gastrointestinal: Positive bowel sounds, soft, nontender, nondistended   Musculoskeletal: No bilateral ankle edema, no clubbing or cyanosis to extremities   Psychiatric: Appropriate affect, cooperative   Neurologic: Oriented x 3,  speech clear   Skin: No rashes     Result Review   Result Review:  I have personally reviewed the results from the time of this admission to 4/29/2025 18:06 EDT  and agree with these findings:  [x]  Laboratory  []  Microbiology  [x]  Radiology  [x]  EKG/Telemetry   [x]  Cardiology/Vascular   []  Pathology  [x]  Old records  []  Other:  Most notable findings include:      Latest Reference Range & Units 04/29/25 15:06   Creatine Kinase 20 - 200 U/L 263 (H)   CKMB <=10.40 ng/mL 4.20   HS Troponin T <22 ng/L 20   proBNP 0.0 - 900.0 pg/mL 68.1         EKG on arrival showed sinus bradycardia, atrial premature complexes, 0.5 to 1 mm ST segment elevation inferior leads with reciprocal changes in high lateral leads and anterior leads, consistent with acute inferior ST segment elevation myocardial infarction.    CMP          4/29/2025    15:06   CMP   Glucose 103    BUN 19    Creatinine 1.03    EGFR 81.1    Sodium 141    Potassium 3.6    Chloride 104    Calcium 9.1    Total Protein 7.3    Albumin 4.5    Globulin 2.8    Total Bilirubin 0.7    Alkaline Phosphatase 77    AST (SGOT) 27    ALT (SGPT) 27    Albumin/Globulin Ratio 1.6    BUN/Creatinine Ratio 18.4    Anion Gap 15.3           Pre and post images        Assessment & Plan  Assessment / Plan     Brief Patient Summary:  Christiano Barr is a 64 y.o. male with hypertension, hyperlipidemia, chronic back pain, previous surgery, neuropathy of the lower extremities, obstructive sleep apnea and prediabetic state.  He presented because of chest pain of several hours duration and was noted to have acute inferior ST segment elevation myocardial infarction.    Active Hospital Problems:  Active Hospital Problems    Diagnosis    • **ST elevation myocardial infarction involving right coronary artery      Acute inferior ST segment elevation myocardial infarction : Culprit lesion is 100% occlusion of the PLV branch.  He underwent successful primary angioplasty with stent placement.  LAD artery has borderline lesions and there is an 80% lesion involving small OM branch.  He is currently chest pain-free.  LVEF is 50% with mild basal to mid  inferior wall hypokinesis    Essential hypertension : Blood pressure not well-controlled  Prediabetic stage  Next hyperlipidemia, on pravastatin at home    Chronic back pain, previous back surgeries      Plan:     Will continue dual antiplatelet therapy with aspirin and Brilinta  Initiate high intensity statins  Continue home beta-blockers  Starting losartan 25 mg daily  As needed IV hydralazine for systolic blood pressure above 160 mmHg      Echocardiography to be done to reevaluate LV function and valvular function  Further management in intensive care unit tonight  Repeat labs a.m.    VTE Prophylaxis: Bilateral SCDs      CODE STATUS:    Code Status (Patient has no pulse and is not breathing): CPR (Attempt to Resuscitate)  Medical Interventions (Patient has pulse or is breathing): Full Support  Level Of Support Discussed With: Patient    Admission Status:  I believe this patient meets inpatient status.    Electronically signed by Lauri Wyatt MD, 04/29/25, 5:57 PM EDT.             Electronically signed by Lauri Wyatt MD at 04/29/25 1810          Emergency Department Notes        Fredy Michaud MD at 04/29/25 1506          Time: 3:06 PM EDT  Date of encounter:  4/29/2025  Independent Historian/Clinical History and Information was obtained by:   Patient and EMS    History is limited by: N/A    Chief Complaint: Chest pain      History of Present Illness:  Patient is a 64 y.o. year old male who presents to the emergency department for evaluation of chest pain.  Patient reports onset of chest pain today, EMS administered aspirin and nitro.      Patient Care Team  Primary Care Provider: Daniel Johnson MD    Past Medical History:     Allergies   Allergen Reactions   • Doxycycline Photosensitivity     Past Medical History:   Diagnosis Date   • Acid reflux    • Anxiety    • Arthritis     OSTEO   • Bradycardia     40's-50's NO CARDIOLOGIST NO MEDS   • Congenital spondylolysis, lumbosacral region    •  Hyperlipidemia    • Hypertension    • Low back pain    • Peripheral neuropathy    • Pre-diabetes     NO MEDS   • Risk factors for obstructive sleep apnea     YOAN 7   • Rotator cuff tear     LEFT   • Sleep apnea     instructed to bring cpap for surgery    • Spinal stenosis of lumbar region with radiculopathy      Past Surgical History:   Procedure Laterality Date   • APPENDECTOMY     • INGUINAL HERNIA REPAIR Right    • LUMBAR FUSION N/A 1/6/2022    Procedure: Open lumbar decompression with posterior lumbar interbody fusion with cages and pedicle scew/susan fixation lumbar four/five, lumbar five/sacral one using the On[employer] robot;  Surgeon: Luis Brewer MD;  Location: [facility] MAIN OR;  Service: Robotics - Neuro;  Laterality: N/A;   • TOTAL KNEE ARTHROPLASTY Left 1/18/2018    Procedure: LT TOTAL KNEE ARTHROPLASTY;  Surgeon: Ralph Panchal MD;  Location: [facility] MAIN OR;  Service:      Family History   Problem Relation Age of Onset   • Malig Hyperthermia Neg Hx        Home Medications:  Prior to Admission medications    Medication Sig Start Date End Date Taking? Authorizing Provider   amLODIPine (NORVASC) 2.5 MG tablet Take 1 tablet by mouth Daily. 5/29/24   Sharmaine Feldman MD   hydroCHLOROthiazide (HYDRODIURIL) 12.5 MG tablet Take 1 tablet by mouth Daily. 5/18/22   Sharmaine Feldman MD   losartan-hydrochlorothiazide (HYZAAR) 100-25 MG per tablet Take 1 tablet by mouth Daily. 11/14/21   Sharmaine Feldman MD   meloxicam (MOBIC) 15 MG tablet Take 1 tablet by mouth Daily. 5/13/22   Sharmaine Feldman MD   metoprolol succinate XL (TOPROL-XL) 25 MG 24 hr tablet Take 1 tablet by mouth Daily. 12/28/21   Jose Holloway DO   montelukast (SINGULAIR) 10 MG tablet Take 1 tablet by mouth Daily. 8/21/21   Sharmaine Feldman MD   pantoprazole (PROTONIX) 40 MG EC tablet Take 1 tablet by mouth Daily. 11/14/21   Sharmaine Feldman MD   pravastatin (PRAVACHOL) 20 MG tablet Take 1 tablet by mouth Daily.  "11/14/21   Provider, MD Sharmaine   tamsulosin (FLOMAX) 0.4 MG capsule 24 hr capsule Take 1 capsule by mouth Daily.  Patient taking differently: Take 1 capsule by mouth Every Night. 1/11/22   Alaina Mascorro, HERO        Social History:   Social History     Tobacco Use   • Smoking status: Never   • Smokeless tobacco: Never   Vaping Use   • Vaping status: Never Used   Substance Use Topics   • Alcohol use: Yes     Alcohol/week: 8.0 standard drinks of alcohol     Types: 8 Cans of beer per week     Comment: 3-4 beers a day    • Drug use: No         Review of Systems:  Review of Systems   Constitutional:  Negative for chills and fever.   HENT:  Negative for congestion, rhinorrhea and sore throat.    Eyes:  Negative for pain and visual disturbance.   Respiratory:  Negative for apnea, cough, chest tightness and shortness of breath.    Cardiovascular:  Positive for chest pain. Negative for palpitations.   Gastrointestinal:  Negative for abdominal pain, diarrhea, nausea and vomiting.   Genitourinary:  Negative for difficulty urinating and dysuria.   Musculoskeletal:  Negative for joint swelling and myalgias.   Skin:  Negative for color change.   Neurological:  Negative for seizures and headaches.   Psychiatric/Behavioral: Negative.     All other systems reviewed and are negative.       Physical Exam:  /75 (BP Location: Left leg, Patient Position: Lying)   Pulse 89   Temp 97.7 °F (36.5 °C) (Oral)   Resp 18   Ht 170.2 cm (67\")   Wt 102 kg (225 lb 12 oz)   SpO2 99%   BMI 35.36 kg/m²     Physical Exam  Vitals and nursing note reviewed.   Constitutional:       General: He is not in acute distress.     Appearance: Normal appearance. He is ill-appearing. He is not toxic-appearing.   HENT:      Head: Normocephalic and atraumatic.      Jaw: There is normal jaw occlusion.   Eyes:      General: Lids are normal.      Extraocular Movements: Extraocular movements intact.      Conjunctiva/sclera: Conjunctivae normal.      " Pupils: Pupils are equal, round, and reactive to light.   Cardiovascular:      Rate and Rhythm: Normal rate and regular rhythm.      Pulses: Normal pulses.      Heart sounds: Normal heart sounds.   Pulmonary:      Effort: Pulmonary effort is normal. No respiratory distress.      Breath sounds: Normal breath sounds. No wheezing or rhonchi.   Abdominal:      General: Abdomen is flat.      Palpations: Abdomen is soft.      Tenderness: There is no abdominal tenderness. There is no guarding or rebound.   Musculoskeletal:         General: Normal range of motion.      Cervical back: Normal range of motion and neck supple.      Right lower leg: No edema.      Left lower leg: No edema.   Skin:     General: Skin is warm and dry.   Neurological:      Mental Status: He is alert and oriented to person, place, and time. Mental status is at baseline.   Psychiatric:         Mood and Affect: Mood normal.                    Medical Decision Making:      Comorbidities that affect care:    Hypertension, GERD    External Notes reviewed:    Previous Clinic Note: Family medicine office visit for general medical management      The following orders were placed and all results were independently analyzed by me:  Orders Placed This Encounter   Procedures   • XR Chest 1 View   • [place] Draw   • High Sensitivity Troponin T   • Comprehensive Metabolic Panel   • Lipase   • BNP   • Magnesium   • CBC Auto Differential   • High Sensitivity Troponin T 1Hr   • CK   • CK-MB   • Comprehensive Metabolic Panel   • Hemoglobin A1c   • Lipid Panel   • Magnesium   • High Sensitivity Troponin T   • TSH   • CK   • CK-MB   • Diet: Cardiac; Healthy Heart (2-3 Na+); Fluid Consistency: Thin (IDDSI 0)   • Undress & Gown   • Vital Signs and Check distal extremity for warmth, color, sensation and pulses with each vital sign and site check.   • Maintain IV Access   • Telemetry - Place Orders & Notify Provider of Results When Patient Experiences Acute Chest Pain,  Dysrhythmia or Respiratory Distress   • Change site dressing   • Encourage fluids   • Strict intake and output   • Continuous Pulse Oximetry   • Advance Diet As Tolerated -   • Notify MD if platelet count is less than 100,000, is less than 1/2 baseline, or if Hgb drops by more than 3mg/dl.   • Notify MD of hypotension (SBP less than 95), bleeding, or dysrythmia and follow Sheath Removal Policy if needed.   • Hold metFORMIN (GLUCOPHAGE) for 48 Hours   • Closure Device Used   • Assess Puncture Site, Vital Signs, Distal Pulses & Observe Site for Bleeding or Swelling   • Head of Bed: Flat; 2 Hours; Elevate Head of Bed: 30 Degrees; Keep Affected Extremity/ Extremities Straight; Total Bedrest Time? 4 Hours   • Code Status and Medical Interventions: CPR (Attempt to Resuscitate); Full Support   • Oxygen Therapy- Nasal Cannula; Titrate 1-6 LPM Per SpO2; 90 - 95%   • Oxygen Therapy- Nasal Cannula; 2 LPM   • POC Activated Clotting Time   • POC Activated Clotting Time   • ECG 12 Lead ED Triage Standing Order; Chest Pain   • ECG 12 Lead ED Triage Standing Order; Chest Pain   • ECG 12 Lead Other; STEMI s/p PCI   • ECG 12 Lead Other; STEMI s/p PCI   • Insert Peripheral IV   • Inpatient Admission   • CBC & Differential   • Green Top (Gel)   • Lavender Top   • Gold Top - SST   • Light Blue Top   • Extra Tubes   • Gray Top   • CBC & Differential       Medications Given in the Emergency Department:  Medications   sodium chloride 0.9 % flush 10 mL (has no administration in time range)   metoprolol succinate XL (TOPROL-XL) 24 hr tablet 100 mg (has no administration in time range)   montelukast (SINGULAIR) tablet 10 mg (has no administration in time range)   pantoprazole (PROTONIX) EC tablet 40 mg (has no administration in time range)   nitroglycerin (NITROSTAT) SL tablet 0.4 mg (has no administration in time range)   sodium chloride 0.9 % infusion (0 mL/hr Intravenous Stopped 4/29/25 2052)   acetaminophen (TYLENOL) tablet 650 mg (has no  administration in time range)   morphine injection 1 mg (has no administration in time range)     And   naloxone (NARCAN) injection 0.4 mg (has no administration in time range)   ondansetron ODT (ZOFRAN-ODT) disintegrating tablet 4 mg (has no administration in time range)     Or   ondansetron (ZOFRAN) injection 4 mg (has no administration in time range)   aspirin chewable tablet 81 mg (has no administration in time range)   ticagrelor (BRILINTA) tablet 90 mg (90 mg Oral Given 4/29/25 2048)   hydrALAZINE (APRESOLINE) injection 20 mg (20 mg Intravenous Given 4/29/25 1812)   losartan (COZAAR) tablet 25 mg (25 mg Oral Given 4/29/25 1812)   tamsulosin (FLOMAX) 24 hr capsule 0.4 mg (has no administration in time range)   atorvastatin (LIPITOR) tablet 40 mg (has no administration in time range)   aspirin chewable tablet 324 mg (324 mg Oral Given 4/29/25 1737)   atorvastatin (LIPITOR) tablet (40 mg Oral Given 4/29/25 1506)   heparin (porcine) 5000 UNIT/ML injection (6,120 Units Intravenous Given 4/29/25 1507)        ED Course:         Labs:    Lab Results (last 24 hours)       Procedure Component Value Units Date/Time    High Sensitivity Troponin T [[id number]]  (Normal) Collected: 04/29/25 1506    Specimen: Blood Updated: 04/29/25 1529     HS Troponin T 20 ng/L     Narrative:      High Sensitive Troponin T Reference Range:  <14.0 ng/L- Negative Female for AMI  <22.0 ng/L- Negative Male for AMI  >=14 - Abnormal Female indicating possible myocardial injury.  >=22 - Abnormal Male indicating possible myocardial injury.   Clinicians would have to utilize clinical acumen, EKG, Troponin, and serial changes to determine if it is an Acute Myocardial Infarction or myocardial injury due to an underlying chronic condition.         CBC & Differential [[id number]]  (Abnormal) Collected: 04/29/25 1506    Specimen: Blood Updated: 04/29/25 1509    Narrative:      The following orders were created for panel order CBC &  Differential.  Procedure                               Abnormality         Status                     ---------                               -----------         ------                     CBC Auto Differential[[id number]]        Abnormal            Final result                 Please view results for these tests on the individual orders.    Comprehensive Metabolic Panel [[id number]]  (Abnormal) Collected: 04/29/25 1506    Specimen: Blood Updated: 04/29/25 1529     Glucose 103 mg/dL      BUN 19 mg/dL      Creatinine 1.03 mg/dL      Sodium 141 mmol/L      Potassium 3.6 mmol/L      Comment: Slight hemolysis detected by analyzer. Result may be falsely elevated.        Chloride 104 mmol/L      CO2 21.7 mmol/L      Calcium 9.1 mg/dL      Total Protein 7.3 g/dL      Albumin 4.5 g/dL      ALT (SGPT) 27 U/L      AST (SGOT) 27 U/L      Alkaline Phosphatase 77 U/L      Total Bilirubin 0.7 mg/dL      Globulin 2.8 gm/dL      A/G Ratio 1.6 g/dL      BUN/Creatinine Ratio 18.4     Anion Gap 15.3 mmol/L      eGFR 81.1 mL/min/1.73     Narrative:      GFR Categories in Chronic Kidney Disease (CKD)      GFR Category          GFR (mL/min/1.73)    Interpretation  G1                     90 or greater         Normal or high (1)  G2                      60-89                Mild decrease (1)  G3a                   45-59                Mild to moderate decrease  G3b                   30-44                Moderate to severe decrease  G4                    15-29                Severe decrease  G5                    14 or less           Kidney failure          (1)In the absence of evidence of kidney disease, neither GFR category G1 or G2 fulfill the criteria for CKD.    eGFR calculation 2021 CKD-EPI creatinine equation, which does not include race as a factor    Lipase [[id number]]  (Normal) Collected: 04/29/25 1506    Specimen: Blood Updated: 04/29/25 1529     Lipase 25 U/L     BNP [[id number]]  (Normal) Collected: 04/29/25 1506    Specimen:  Blood Updated: 04/29/25 1526     proBNP 68.1 pg/mL     Narrative:      This assay is used as an aid in the diagnosis of individuals suspected of having heart failure. It can be used as an aid in the diagnosis of acute decompensated heart failure (ADHF) in patients presenting with signs and symptoms of ADHF to the emergency department (ED). In addition, NT-proBNP of <300 pg/mL indicates ADHF is not likely.    Age Range Result Interpretation  NT-proBNP Concentration (pg/mL:      <50             Positive            >450                   Gray                 300-450                    Negative             <300    50-75           Positive            >900                  Gray                300-900                  Negative            <300      >75             Positive            >1800                  Gray                300-1800                  Negative            <300    Magnesium [[id number]]  (Normal) Collected: 04/29/25 1506    Specimen: Blood Updated: 04/29/25 1529     Magnesium 2.3 mg/dL     CBC Auto Differential [[id number]]  (Abnormal) Collected: 04/29/25 1506    Specimen: Blood Updated: 04/29/25 1509     WBC 9.07 10*3/mm3      RBC 4.76 10*6/mm3      Hemoglobin 15.5 g/dL      Hematocrit 43.5 %      MCV 91.4 fL      MCH 32.6 pg      MCHC 35.6 g/dL      RDW 12.7 %      RDW-SD 42.1 fl      MPV 9.1 fL      Platelets 231 10*3/mm3      Neutrophil % 72.9 %      Lymphocyte % 15.7 %      Monocyte % 8.5 %      Eosinophil % 2.1 %      Basophil % 0.4 %      Immature Grans % 0.4 %      Neutrophils, Absolute 6.61 10*3/mm3      Lymphocytes, Absolute 1.42 10*3/mm3      Monocytes, Absolute 0.77 10*3/mm3      Eosinophils, Absolute 0.19 10*3/mm3      Basophils, Absolute 0.04 10*3/mm3      Immature Grans, Absolute 0.04 10*3/mm3      nRBC 0.0 /100 WBC     CK [[id number]]  (Abnormal) Collected: 04/29/25 1506    Specimen: Blood Updated: 04/29/25 1734     Creatine Kinase 263 U/L     CK-MB [[id number]]  (Normal) Collected: 04/29/25 1506     Specimen: Blood Updated: 25 1733     CKMB 4.20 ng/mL     Narrative:      Results may be falsely decreased if patient taking Biotin.      POC Activated Clotting Time [[id number]]  (Abnormal) Collected: 25 161    Specimen: Blood Updated: 25 1618     Activated Clotting Time  273 Seconds      Comment: Serial Number: [id number]:  [id number]       High Sensitivity Troponin T 1Hr [[id number]]  (Abnormal) Collected: 25 172    Specimen: Blood from Hand, Left Updated: 25 1834     HS Troponin T 255 ng/L      Troponin T Numeric Delta 235 ng/L     Narrative:      High Sensitive Troponin T Reference Range:  <14.0 ng/L- Negative Female for AMI  <22.0 ng/L- Negative Male for AMI  >=14 - Abnormal Female indicating possible myocardial injury.  >=22 - Abnormal Male indicating possible myocardial injury.   Clinicians would have to utilize clinical acumen, EKG, Troponin, and serial changes to determine if it is an Acute Myocardial Infarction or myocardial injury due to an underlying chronic condition.                  Imaging:    Cardiac Catheterization/Vascular Study  Result Date: 2025  [facility] CARDIAC CATHETERIZATION PROCEDURE REPORT Patient: Christiano Barr : 1960 MRN: [id number] Procedure Date: 25 Referring Physician: Fredy Michaud MD Interventional Cardiologist: Lauri Wyatt MD Indication: Inferior ST segment elevation myocardial infarction Clinical Presentation: Mr. Barr presented to the emergency room in the evening because of chest pain since 3 AM today.  The pain eventually got intense and unrelenting, hence EMS was called.  EKGs done by EMS and also the emergency room showed ST segment elevation in inferior leads with reciprocal changes.  He received IV heparin, aspirin in the emergency room.  He was transferred to Cath Lab for emergent Cardiac catheterization and primary angioplasty if indicated. Procedure performed: Diagnostic Left Heart  Catheterization Coronary Angiography Left Ventriculography Successful percutaneous coronary intervention to right PLV branch using 2.25 x 18 Xience Skypoint drug-eluting stent, postdilated using 2.5 noncompliant balloon with good angiographic results Access Site(s): Right radial artery Right common femoral artery Findings: 1. Coronary Artery Anatomy: Dominance: Right Left Main: Normal with no stenosis Left Anterior Descending artery: Proximal LAD artery has luminal irregularities.  Mid LAD has a lesion which is angiographically 40% severity.  There is a second lesion in the mid LAD artery which is angiographically 70% in severity.  D1 branch is a large vessel (larger than LAD proper) with multiple subbranches.  Is free of any significant stenosis Left Circumflex Artery: Nondominant vessel giving rise to 4 OM branches.  OM1 and OM 2 are normal.  OM 3 has an 80% lesion in its proximal segment.  This is a small vessel.  Terminal OM is a small vessel and is free of any stenosis.  LCx artery has luminal irregularities but no angiographically significant lesions. Right Coronary Artery: Large dominant vessel giving rise to right PDA and PLV branches.  Proximal to distal RCA has no significant lesions.  Right PDA is free of any stenosis.  Right PLV branch is 100% occluded in its proximal segment.  This appears to be the culprit lesion for patient's presentation with inferior STEMI.  Distal segment of this branch is noted to be filled by left-to-right collaterals on antegrade injections of left main coronary artery. 2. Hemodynamics:    The opening aortic pressure is 180/72 with a mean of 94 mmHg. The left ventricular end-diastolic pressure is 14 mmHg. There was no gradient across aortic valve on pullback The closing aortic pressure is 146/63 with a mean of 97 mmHg  3. Left Ventriculogram: Ejection Fraction: 50% Wall Motion: There is mild basal to mid inferior wall hypokinesis. Mitral Regurgitation: No significant mitral  regurgitation noted 4. Percutaneous Intervention: Location: Right PLV branch Treatment: ÓSCAR placement Pre-stenosis: 100% Post-stenosis: 0% Lesion Type C: Yes AYAN Flow Pre: 0 AYAN Flow Post: 3 Bifurcation: No Severe Calcium: No Dissection: No Conclusions: 100% occlusion of the right PLV branch, which is the culprit lesion for patient's presentation with acute inferior STEMI.  LAD artery has a borderline lesion in mid segment, which is 70% severity.  OM 3 branch has an 80% stenosis, which is a small vessel. Overall LV ejection fraction is 50% with mild basal to mid inferior wall hypokinesis. Normal left ventricular end-diastolic pressure Successful percutaneous coronary intervention to right PLV branch with placement of 2.25 x 18 Xience Skypoint drug-eluting stent, postdilated using 2.5 noncompliant balloon with good radiographic results. Recommendations: Continue dual antiplatelet therapy with aspirin and Brilinta High intensity statins and other measures of secondary prevention Further management in the intensive care unit Procedure Status: Emergent Details of the procedure: Informed consent was obtained with an explanation of the risks, benefits and alternatives of the procedure. The patient was brought to the Cardiac Catheterization Laboratory and was prepped and draped in a standard sterile fashion. Moderate sedation with Fentanyl and Versed was administered by the circulating nurse. Lidocaine 2% was used to anesthetize the right radial artery and a 5/6 Slender sheath was placed.  We advanced a 6 Greek Paws for Lifeari 3.75 guide catheter over a wire.  Significant tortuosity of right subclavian vessel was noted.  We were able to advance the catheter into the ascending aorta over a wire.  However coronary arteries could not be engaged due to the tortuosity involving the subclavian vessels.  The guide catheter was taken out of the body over a wire.  The radial artery sheath was pulled and TR band was applied for hemostasis.  Next, the right inguinal region was prepared, cleaned and draped in usual sterile fashion.  2% lidocaine was used to anesthetize area.  Using an 18-gauge Cook needle, right common femoral artery was cannulated and a 6 Egyptian arterial sheath was advanced by modified Seldinger technique.  A 5 Egyptian JL 4 diagnostic catheter was used to engage the ostium of left main coronary artery.  A 6 Egyptian 3 DRC guide catheter was used to successfully engage the ostium of right coronary artery.  Diagnostic angiography was performed by injection of nonionic contrast in all appropriate projections.  We found 100% occlusion of the right PLV branch, hence proceeded with angioplasty.  Please see below for details of angioplasty.  A 6 Egyptian pigtail catheter was used to cross the aortic valve.  Left ventricular hemodynamics were documented.  Left ventriculogram was performed injection of nonionic contrast in BRINK projection.  Gradient across aortic valve was documented by pullback technique. Details of angioplasty: A 6 Egyptian 3 DRC guide catheter was used to successfully engage the ostium of right coronary artery.  BMW interventional wire was advanced without difficulty, through the RCA into the occlusion of the right PLV branch.  The lesion was predilated using 2.25 x 12 noncompliant balloon to maximum pressure of 12 opal.  Multiple inflations were performed.  Next angiogram showed AYAN-3 flow in the entire PLV branch.  The lesion was stented from normal to normal segment using 2.25 x 18 Xience wily point drug-eluting stent.  Stent was deployed at 12 opal.  Next, the stent was postdilated using 2.5 x 12 noncompliant balloon.  The max inflation pressure was 16 opal at the midportion of the stent.  The post dilating balloon was removed and final angiogram was performed in multiple projections, before and after removing the interventional wire.  There was no dissection, perforation or distal embolization.  The guide catheter was taken out of  the body over a wire. Heparin was used for anticoagulation throughout the procedure with frequent checking of ACT.  Patient was already loaded with aspirin in the emergency room.  He was loaded with 180 mg of Brilinta at the beginning of angioplasty.  At the end of the procedure, a limited right femoral angiogram was performed by injection through the arterial sheath.  Appropriate arteriotomy site was noted.  The arteriotomy site was closed using a 6 Faroese Angio-Seal closure device.  Patient tolerated procedure well without any complications.  The results of the test were explained in detail to the patient and multiple family members. Cumulative fluoroscopy time: 20.5 min Cumulative air kerma: 1241 mGy Total amount of contrast used: 260 ml of Isovue Complications: None. Estimated Blood Loss: 15 mL Lauri Wyatt MD 04/29/25 17:16 EDT     XR Chest 1 View  Result Date: 4/29/2025  XR CHEST 1 VW Date of Exam: 4/29/2025 3:11 PM EDT Indication: Chest Pain Triage Protocol Comparison: CXR 1/11/2018 Findings: The heart is normal in size. Low lung volumes are evident. No airspace disease or consolidation is evident. Bony structures appear intact.     Impression: Low lung volumes. No active disease is seen. Electronically Signed: Turner Guerrero MD  4/29/2025 3:27 PM EDT  Workstation ID: [id number]        Differential Diagnosis and Discussion:    Chest Pain:  Based on the patient's signs and symptoms, I considered aortic dissection, myocardial infaction, pulmonary embolism, cardiac tamponade, pericarditis, pneumothorax, musculoskeletal chest pain and other differential diagnosis as an etiology of the patient's chest pain.     PROCEDURES:    Labs were collected in the emergency department and all labs were reviewed and interpreted by me.  X-ray were performed in the emergency department and all X-ray impressions were independently interpreted by me.  An EKG was performed and the EKG was interpreted by me.    ECG 12 Lead Other;  STEMI s/p PCI   Preliminary Result   HEART RATE=79  bpm   RR Istmigbv=779  ms   RI Siqsyfnl=823  ms   P Horizontal Axis=41  deg   P Front Axis=44  deg   QRSD Interval=91  ms   QT Lmmdzvpg=106  ms   WAdJ=111  ms   QRS Axis=-29  deg   T Wave Axis=64  deg   - ABNORMAL ECG -   Sinus rhythm   Prolonged RI interval   Probable LVH with secondary repol abnrm   Date and Time of Study:2025-04-29 19:21:10      ECG 12 Lead ED Triage Standing Order; Chest Pain   Preliminary Result   HEART RATE=51  bpm   RR Gkshbfsx=3965  ms   RI Jzqjvdhy=466  ms   P Horizontal Axis=3  deg   P Front Axis=26  deg   QRSD Tazdbsvs=440  ms   QT Dwbiopra=976  ms   ZXcD=386  ms   QRS Axis=0  deg   T Wave Axis=74  deg   - ABNORMAL ECG -   Sinus bradycardia   Atrial premature complex   Probable left ventricular hypertrophy   Inferior infarct, acute (RCA)   Date and Time of Study:2025-04-29 14:57:31        My interpretation of the EKG shows ST elevation MI, normal QT, normal rate    Procedures    MDM  Number of Diagnoses or Management Options  ST elevation myocardial infarction (STEMI), unspecified artery  Diagnosis management comments: In summary this is a 64-year-old male patient who presents to the emerged from for evaluation of chest pain and was found to have STEMI.  He was sent to Cath Lab.  CBC independently reviewed and interpreted by me and shows no critical abnormalities.  CMP independently reviewed and interpreted by me and shows no critical abnormalities.  Patient case has been discussed with the interventional cardiology team who will take patient to Cath Lab.             Patient was placed on the cardiac monitor after being given IV heparin.  They were monitored for ventricular ectopy, arrhythmia, tachycardia, hypoxia, and changes in blood pressure.  Patient was rechecked several times throughout their stay for mental status decline and for reassessment of worsening changes in vital signs.     Total Critical Care time of 35 minutes. Total  critical care time documented does not include time spent on separately billed procedures for services of nurses or physician assistants. I personally saw and examined the patient. I have reviewed all diagnostic interpretations and treatment plans as written. I was present for the key portions of any procedures performed and the inclusive time noted in any critical care statement. Critical care time includes patient management by me, time spent at the patients bedside,  time to review lab and imaging results, discussing patient care, documentation in the medical record, and time spent with family or caregiver.          Patient Care Considerations:    CT CHEST: I considered ordering a CT scan of the chest, however EKG clearly shows STEMI      Consultants/Shared Management Plan:    Consultant: I have discussed the case with Dr. Wyatt who states activate Cath Lab    Social Determinants of Health:    Patient is independent, reliable, and has access to care.       Disposition and Care Coordination:    Admit:   Through independent evaluation of the patient's history, physical, and imperical data, the patient meets criteria for inpatient admission to the hospital.        Final diagnoses:   ST elevation myocardial infarction (STEMI), unspecified artery        ED Disposition       ED Disposition   Decision to Admit    Condition   --    Comment   Level of Care: Critical Care [6]   Diagnosis: STEMI (ST elevation myocardial infarction) [[id number]]   Certification: I Certify That Inpatient Hospital Services Are Medically Necessary For Greater Than 2 Midnights                 This medical record created using voice recognition software.             Fredy Michaud MD  04/29/25 2119      Electronically signed by Fredy Michaud MD at 04/29/25 2119       Current Facility-Administered Medications   Medication Dose Route Frequency Provider Last Rate Last Admin   • acetaminophen (TYLENOL) tablet 650 mg  650 mg Oral Q4H PRN Valayam,  Lauri GATES MD       • amLODIPine (NORVASC) tablet 2.5 mg  2.5 mg Oral Q24H Lauri Wyatt MD   2.5 mg at 04/30/25 0807   • aspirin chewable tablet 81 mg  81 mg Oral Daily Lauri Wyatt MD   81 mg at 04/30/25 0807   • atorvastatin (LIPITOR) tablet 40 mg  40 mg Oral Daily Lauri Wyatt MD   40 mg at 04/30/25 0807   • hydrALAZINE (APRESOLINE) injection 20 mg  20 mg Intravenous Q4H PRN Lauri Wyatt MD   20 mg at 04/29/25 1812   • hydroCHLOROthiazide tablet 25 mg  25 mg Oral Daily Lauri Wyatt MD   25 mg at 04/30/25 1008   • losartan (COZAAR) tablet 100 mg  100 mg Oral Q24H Lauri Wyatt MD   100 mg at 04/30/25 1008   • metoprolol succinate XL (TOPROL-XL) 24 hr tablet 100 mg  100 mg Oral Daily Lauri Wyatt MD   100 mg at 04/30/25 0808   • montelukast (SINGULAIR) tablet 10 mg  10 mg Oral Daily Lauri Wyatt MD   10 mg at 04/30/25 0807   • morphine injection 1 mg  1 mg Intravenous Q4H PRN Lauri Wyatt MD        And   • naloxone (NARCAN) injection 0.4 mg  0.4 mg Intravenous Q5 Min PRN Lauri Wyatt MD       • mupirocin (BACTROBAN) 2 % nasal ointment 1 Application  1 Application Each Nare BID Lauri Wyatt MD       • nitroglycerin (NITROSTAT) SL tablet 0.4 mg  0.4 mg Sublingual Q5 Min PRN Lauri Wyatt MD       • ondansetron ODT (ZOFRAN-ODT) disintegrating tablet 4 mg  4 mg Oral Q6H PRN Lauri Wyatt MD        Or   • ondansetron (ZOFRAN) injection 4 mg  4 mg Intravenous Q6H PRN Lauri Wyatt MD       • pantoprazole (PROTONIX) EC tablet 40 mg  40 mg Oral Daily Lauri Wyatt MD   40 mg at 04/30/25 0807   • sodium chloride 0.9 % flush 10 mL  10 mL Intravenous PRN Lauri Wyatt MD   10 mL at 04/30/25 0807   • tamsulosin (FLOMAX) 24 hr capsule 0.4 mg  0.4 mg Oral Daily Lauri Wyatt MD   0.4 mg at 04/30/25 0807   • ticagrelor (BRILINTA) tablet 90 mg  90 mg Oral Q12H Lauri Wyatt MD   90 mg at 04/30/25 0807        Physician Progress Notes  (last 24 hours)        Lauri Wyatt MD at 25 0846           [facility]     Cardiology Progress Note    Patient Name: Christiano Barr  : 1960  MRN: [id number]  Primary Care Physician:  Daniel Johnson MD  Date of admission: 2025    Subjective   Subjective     Chief Complaint: Follow-up visit for chest pain, STEMI, status post primary angioplasty    Interval HPI:    Patient reports feeling fine today.  Chest pain completely subsided.  No shortness of breath, palpitations or dizziness.  Telemetry showing sinus rhythm with no arrhythmias.  Blood pressure remains elevated.    Review of Systems   All systems were reviewed and negative except for: Per HPI    Objective   Objective     Vitals:   Temp:  [97.7 °F (36.5 °C)-98.2 °F (36.8 °C)] 98 °F (36.7 °C)  Heart Rate:  [51-91] 69  Resp:  [12-22] 16  BP: (145-195)/(63-96) 181/81  Flow (L/min) (Oxygen Therapy):  [2] 2  Physical Exam      General : Alert, awake, no acute distress  CVS : Regular rate and rhythm, no murmur, rubs or gallops  Lungs: Clear to auscultation bilaterally, no crackles or rhonchi  Abdomen: Soft, nontender, bowel sounds heard in all 4 quadrants  Extremities: Warm, well-perfused, no pedal edema.  Skin bruising noted at the right inguinal region.  No palpable hematoma.  No audible bruit.  Right wrist free of hematoma.    Scheduled Meds:amLODIPine, 2.5 mg, Oral, Q24H  aspirin, 81 mg, Oral, Daily  atorvastatin, 40 mg, Oral, Daily  hydroCHLOROthiazide, 25 mg, Oral, Daily  losartan, 100 mg, Oral, Q24H  metoprolol succinate XL, 100 mg, Oral, Daily  montelukast, 10 mg, Oral, Daily  mupirocin, 1 Application, Each Nare, BID  pantoprazole, 40 mg, Oral, Daily  tamsulosin, 0.4 mg, Oral, Daily  ticagrelor, 90 mg, Oral, Q12H           Result Review    Result Review:  I have personally reviewed the results from the time of this admission to 2025 08:47 EDT and agree with these findings:  [x]  Laboratory  []  Microbiology  [x]   Radiology  [x]  EKG/Telemetry   [x]  Cardiology/Vascular   []  Pathology  []  Old records  []  Other:  Most notable findings include:     CBC          4/29/2025    15:06 4/30/2025    02:43   CBC   WBC 9.07  8.25    RBC 4.76  4.73    Hemoglobin 15.5  15.0    Hematocrit 43.5  42.4    MCV 91.4  89.6    MCH 32.6  31.7    MCHC 35.6  35.4    RDW 12.7  12.8    Platelets 231  205      CMP          4/29/2025    15:06 4/30/2025    02:43   CMP   Glucose 103  104    BUN 19  12    Creatinine 1.03  0.87    EGFR 81.1  96.4    Sodium 141  137    Potassium 3.6  3.4    Chloride 104  102    Calcium 9.1  9.0    Total Protein 7.3  6.8    Albumin 4.5  3.9    Globulin 2.8  2.9    Total Bilirubin 0.7  0.9    Alkaline Phosphatase 77  70    AST (SGOT) 27  62    ALT (SGPT) 27  30    Albumin/Globulin Ratio 1.6  1.3    BUN/Creatinine Ratio 18.4  13.8    Anion Gap 15.3  13.4       CARDIAC LABS:     Latest Reference Range & Units 04/29/25 15:06 04/29/25 17:28 04/30/25 02:43   Creatine Kinase 20 - 200 U/L 263 (H)  588 (H)   CKMB <=10.40 ng/mL 4.20  29.26 (H)   HS Troponin T <22 ng/L 20 255 (C) 945 (C)   Troponin T Numeric Delta Abnormal if >/=3 ng/L  235 (C)    proBNP 0.0 - 900.0 pg/mL 68.1           EKG done this morning showed sinus rhythm, normal axis, LVH by voltage criteria, ST segment elevation resolved in inferior leads    Assessment & Plan   Assessment / Plan     Brief Patient Summary:  Christiano Barr is a 64 y.o. male with hypertension, hyperlipidemia, chronic back pain, previous surgery, neuropathy of the lower extremities, obstructive sleep apnea and prediabetic state.  He presented because of chest pain of several hours duration and was noted to have acute inferior ST segment elevation myocardial infarction.     Active Hospital Problems:  Active Hospital Problems    Diagnosis    • **ST elevation myocardial infarction involving right coronary artery      Acute inferior ST segment elevation myocardial infarction : Culprit lesion  is 100% occlusion of the PLV branch.  He underwent successful primary angioplasty with stent placement.  LAD artery has borderline lesions and there is an 80% lesion involving small OM branch.  He is currently chest pain-free.  LVEF is 50% with mild basal to mid inferior wall hypokinesis     Essential hypertension : Blood pressure on the higher side  Prediabetic stage  Next hyperlipidemia, on pravastatin at home     Chronic back pain, previous back surgerie      Plan:     Continue aspirin, Brilinta  Home pravastatin changed to high intensity statins  Restarting home antihypertensives including hydrochlorothiazide, losartan and amlodipine  Continue metoprolol succinate 100 mg daily    Echocardiography to be done today to reevaluate LV function and valvular function  Transfer out of intensive care unit today  Repeating labs including cardiac markers a.m.       CODE STATUS:   Code Status (Patient has no pulse and is not breathing): CPR (Attempt to Resuscitate)  Medical Interventions (Patient has pulse or is breathing): Full Support  Level Of Support Discussed With: Patient      Electronically signed by Lauri Wyatt MD, 04/30/25, 8:47 AM EDT.             Electronically signed by Lauri Wyatt MD at 04/30/25 0887       Consult Notes (last 24 hours)  Notes from 04/29/25 1108 through 04/30/25 1108   No notes of this type exist for this encounter.

## 2025-04-30 NOTE — PLAN OF CARE
Goal Outcome Evaluation:remains nsr with heart rate 60's, bp 140-150's, max sbp 161;no c/o chest discomfort, soa, or nausea;right radial cath site mildly bruised-tr band removed, right groin site also has mild bruising with clean and dry drsg in place;strong pedal pulses

## 2025-05-01 VITALS
DIASTOLIC BLOOD PRESSURE: 82 MMHG | SYSTOLIC BLOOD PRESSURE: 149 MMHG | WEIGHT: 210.32 LBS | HEART RATE: 64 BPM | TEMPERATURE: 97.9 F | OXYGEN SATURATION: 96 % | HEIGHT: 67 IN | BODY MASS INDEX: 33.01 KG/M2 | RESPIRATION RATE: 18 BRPM

## 2025-05-01 LAB
ALBUMIN SERPL-MCNC: 4.2 G/DL (ref 3.5–5.2)
ALBUMIN/GLOB SERPL: 1.5 G/DL
ALP SERPL-CCNC: 74 U/L (ref 39–117)
ALT SERPL W P-5'-P-CCNC: 26 U/L (ref 1–41)
ANION GAP SERPL CALCULATED.3IONS-SCNC: 15 MMOL/L (ref 5–15)
AST SERPL-CCNC: 35 U/L (ref 1–40)
BILIRUB SERPL-MCNC: 0.9 MG/DL (ref 0–1.2)
BUN SERPL-MCNC: 15 MG/DL (ref 8–23)
BUN/CREAT SERPL: 16.7 (ref 7–25)
CALCIUM SPEC-SCNC: 9.3 MG/DL (ref 8.6–10.5)
CHLORIDE SERPL-SCNC: 101 MMOL/L (ref 98–107)
CK MB SERPL-CCNC: 5.65 NG/ML
CK SERPL-CCNC: 246 U/L (ref 20–200)
CO2 SERPL-SCNC: 21 MMOL/L (ref 22–29)
CREAT SERPL-MCNC: 0.9 MG/DL (ref 0.76–1.27)
DEPRECATED RDW RBC AUTO: 42.4 FL (ref 37–54)
EGFRCR SERPLBLD CKD-EPI 2021: 95.4 ML/MIN/1.73
ERYTHROCYTE [DISTWIDTH] IN BLOOD BY AUTOMATED COUNT: 13 % (ref 12.3–15.4)
GLOBULIN UR ELPH-MCNC: 2.8 GM/DL
GLUCOSE SERPL-MCNC: 98 MG/DL (ref 65–99)
HCT VFR BLD AUTO: 43.8 % (ref 37.5–51)
HGB BLD-MCNC: 15.5 G/DL (ref 13–17.7)
MAGNESIUM SERPL-MCNC: 2 MG/DL (ref 1.6–2.4)
MCH RBC QN AUTO: 32.2 PG (ref 26.6–33)
MCHC RBC AUTO-ENTMCNC: 35.4 G/DL (ref 31.5–35.7)
MCV RBC AUTO: 90.9 FL (ref 79–97)
PHOSPHATE SERPL-MCNC: 3.6 MG/DL (ref 2.5–4.5)
PLATELET # BLD AUTO: 212 10*3/MM3 (ref 140–450)
PMV BLD AUTO: 9 FL (ref 6–12)
POTASSIUM SERPL-SCNC: 3.8 MMOL/L (ref 3.5–5.2)
PROT SERPL-MCNC: 7 G/DL (ref 6–8.5)
RBC # BLD AUTO: 4.82 10*6/MM3 (ref 4.14–5.8)
SODIUM SERPL-SCNC: 137 MMOL/L (ref 136–145)
TROPONIN T SERPL HS-MCNC: 363 NG/L
WBC NRBC COR # BLD AUTO: 7.84 10*3/MM3 (ref 3.4–10.8)

## 2025-05-01 PROCEDURE — 83735 ASSAY OF MAGNESIUM: CPT | Performed by: NURSE PRACTITIONER

## 2025-05-01 PROCEDURE — 82553 CREATINE MB FRACTION: CPT | Performed by: INTERNAL MEDICINE

## 2025-05-01 PROCEDURE — 85027 COMPLETE CBC AUTOMATED: CPT | Performed by: INTERNAL MEDICINE

## 2025-05-01 PROCEDURE — 82550 ASSAY OF CK (CPK): CPT | Performed by: INTERNAL MEDICINE

## 2025-05-01 PROCEDURE — 80053 COMPREHEN METABOLIC PANEL: CPT | Performed by: INTERNAL MEDICINE

## 2025-05-01 PROCEDURE — 84484 ASSAY OF TROPONIN QUANT: CPT | Performed by: INTERNAL MEDICINE

## 2025-05-01 PROCEDURE — 84100 ASSAY OF PHOSPHORUS: CPT | Performed by: NURSE PRACTITIONER

## 2025-05-01 RX ORDER — ATORVASTATIN CALCIUM 40 MG/1
40 TABLET, FILM COATED ORAL DAILY
Qty: 30 TABLET | Refills: 1 | Status: SHIPPED | OUTPATIENT
Start: 2025-05-01

## 2025-05-01 RX ORDER — ASPIRIN 81 MG/1
81 TABLET, CHEWABLE ORAL DAILY
Qty: 30 TABLET | Refills: 1 | Status: SHIPPED | OUTPATIENT
Start: 2025-05-01

## 2025-05-01 RX ADMIN — METOPROLOL SUCCINATE 100 MG: 100 TABLET, EXTENDED RELEASE ORAL at 09:19

## 2025-05-01 RX ADMIN — TICAGRELOR 90 MG: 90 TABLET ORAL at 09:20

## 2025-05-01 RX ADMIN — PANTOPRAZOLE SODIUM 40 MG: 40 TABLET, DELAYED RELEASE ORAL at 09:19

## 2025-05-01 RX ADMIN — TAMSULOSIN HYDROCHLORIDE 0.4 MG: 0.4 CAPSULE ORAL at 09:20

## 2025-05-01 RX ADMIN — AMLODIPINE BESYLATE 2.5 MG: 5 TABLET ORAL at 09:20

## 2025-05-01 RX ADMIN — ASPIRIN 81 MG: 81 TABLET, CHEWABLE ORAL at 09:19

## 2025-05-01 RX ADMIN — LOSARTAN POTASSIUM 100 MG: 50 TABLET, FILM COATED ORAL at 09:19

## 2025-05-01 RX ADMIN — MONTELUKAST 10 MG: 10 TABLET, FILM COATED ORAL at 09:19

## 2025-05-01 RX ADMIN — HYDROCHLOROTHIAZIDE 25 MG: 25 TABLET ORAL at 09:20

## 2025-05-01 RX ADMIN — ATORVASTATIN CALCIUM 40 MG: 40 TABLET, FILM COATED ORAL at 09:20

## 2025-05-01 NOTE — DISCHARGE SUMMARY
[facility]         CARDIOLOGY DISCHARGE SUMMARY    Patient Name: Christiano Barr  : 1960  MRN: [id number]    Date of Admission: 2025  Date of Discharge:  2025  Primary Care Physician: Daniel Johnson MD      Presenting Problem:   STEMI (ST elevation myocardial infarction) [I21.3]  ST elevation myocardial infarction (STEMI), unspecified artery [I21.3]    Discharge diagnosis    Acute inferior ST segment elevation myocardial infarction, status post primary angioplasty with ÓSCAR placement to right posterolateral ventricular branch    Borderline and small vessel disease involving LAD artery and OM branches  Essential hypertension  Mixed hyperlipidemia  Prediabetic state      Hospital Course     Hospital Course:  Christiano Barr is a 64 y.o. male with hypertension, hyperlipidemia and prediabetic state.  He presents emergency room because of chest pain of several hours duration.  EKGs done in the emergency room showed ST segment elevations in the inferior leads.  He underwent emergent Cardiac catheterization and identified 100% occlusion of right PLV branches culprit lesion.  He had primary angioplasty with drug-eluting stent placement.  LAD artery had a 70% lesion.  He also had 80% lesion OM branch which was small vessel.  LV ejection fraction was noted to be 50% per echocardiogram and LV gram.    He was subsequently admitted to intensive care unit.  Rest of the hospital stay was uneventful.  He remained chest pain-free.  There were no arrhythmias.  Home antihypertensive medications were restarted.  He was continued on dual antiplatelet therapy.  Statins were changed to high intensity statins.    He is being discharged home in hemodynamically stable condition.  There were no arterial access site problems.  Counseled extensively regarding medication compliance, especially compliance to dual antiplatelet therapy.  He will follow-up in the cardiology clinic in 2  weeks.        Day of Discharge     Vital Signs:  Temp:  [97.7 °F (36.5 °C)-98.4 °F (36.9 °C)] 97.9 °F (36.6 °C)  Heart Rate:  [53-64] 64  Resp:  [18] 18  BP: (125-151)/(50-82) 149/82  Physical Exam:    General : Alert, awake, no acute distress  HEENT : No pallor, anicteric  Neck : Supple, no carotid bruit  CVS : Regular rate and rhythm, no murmur, rubs or gallops  Lungs: Clear to auscultation bilaterally, no crackles or rhonchi  Abdomen: Soft, nontender, bowel sounds heard in all 4 quadrants  Right groin : No palpable hematoma, distal pulses palpable  Neuro : Alert, oriented x 3, no focal neurological deficits  Extremities: Warm, well-perfused, no pedal edema    Pertinent  and/or Most Recent Results     LAB RESULTS:      Lab 05/01/25 0628 04/30/25 0243 04/29/25  1506   WBC 7.84 8.25 9.07   HEMOGLOBIN 15.5 15.0 15.5   HEMATOCRIT 43.8 42.4 43.5   PLATELETS 212 205 231   NEUTROS ABS  --  6.07 6.61   IMMATURE GRANS (ABS)  --  0.03 0.04   LYMPHS ABS  --  1.15 1.42   MONOS ABS  --  0.80 0.77   EOS ABS  --  0.17 0.19   MCV 90.9 89.6 91.4         Lab 05/01/25 0628 04/30/25 0243 04/29/25  1506   SODIUM 137 137 141   POTASSIUM 3.8 3.4* 3.6   CHLORIDE 101 102 104   CO2 21.0* 21.6* 21.7*   ANION GAP 15.0 13.4 15.3*   BUN 15 12 19   CREATININE 0.90 0.87 1.03   EGFR 95.4 96.4 81.1   GLUCOSE 98 104* 103*   CALCIUM 9.3 9.0 9.1   MAGNESIUM 2.0 1.9 2.3   PHOSPHORUS 3.6 3.6  --    HEMOGLOBIN A1C  --  5.60  --    TSH  --  2.430  --          Lab 05/01/25 0628 04/30/25 0243 04/29/25  1506   TOTAL PROTEIN 7.0 6.8 7.3   ALBUMIN 4.2 3.9 4.5   GLOBULIN 2.8 2.9 2.8   ALT (SGPT) 26 30 27   AST (SGOT) 35 62* 27   BILIRUBIN 0.9 0.9 0.7   ALK PHOS 74 70 77   LIPASE  --   --  25         Lab 05/01/25  0628 04/30/25  0243 04/29/25  1728 04/29/25  1506   PROBNP  --   --   --  68.1   HSTROP T 363* 945* 255* 20         Lab 04/30/25  0243   CHOLESTEROL 152   LDL CHOL 92   HDL CHOL 39*   TRIGLYCERIDES 118      Latest Reference Range & Units  04/29/25 15:06 04/29/25 17:28 04/30/25 02:43 05/01/25 06:28   Creatine Kinase 20 - 200 U/L 263 (H)  588 (H) 246 (H)   CKMB <=10.40 ng/mL 4.20  29.26 (H) 5.65   HS Troponin T <22 ng/L 20 255 (C) 945 (C) 363 (C)   Troponin T Numeric Delta Abnormal if >/=3 ng/L  235 (C)     proBNP 0.0 - 900.0 pg/mL 68.1            Results for orders placed during the hospital encounter of 04/29/25    Adult Transthoracic Echo Complete w/ Color, Spectral and Contrast if necessary per protocol    Interpretation Summary    Overall LV ejection fraction is 55 to 60%.  There is mild basal inferior wall hypokinesis.    Left ventricular diastolic function is normal.    There is mild tricuspid regurgitation.  Estimated right ventricular systolic pressure from tricuspid regurgitation is normal (<35 mmHg).      Labs Pending at Discharge: None        Discharge Details        Discharge Medications        New Medications        Instructions Start Date   aspirin 81 MG chewable tablet   Chew 1 tablet by mouth once daily.      atorvastatin 40 MG tablet  Commonly known as: LIPITOR   40 mg, Oral, Daily      Brilinta 90 MG tablet tablet  Generic drug: ticagrelor   90 mg, Oral, Every 12 Hours Scheduled             Changes to Medications        Instructions Start Date   tamsulosin 0.4 MG capsule 24 hr capsule  Commonly known as: FLOMAX  What changed: when to take this   0.4 mg, Oral, Daily             Continue These Medications        Instructions Start Date   amLODIPine 2.5 MG tablet  Commonly known as: NORVASC   1 tablet, Daily      losartan-hydrochlorothiazide 100-25 MG per tablet  Commonly known as: HYZAAR   1 tablet, Daily      meloxicam 15 MG tablet  Commonly known as: MOBIC   15 mg, Daily      metoprolol succinate  MG 24 hr tablet  Commonly known as: TOPROL-XL   100 mg, Daily      montelukast 10 MG tablet  Commonly known as: SINGULAIR   10 mg, Daily      pantoprazole 40 MG EC tablet  Commonly known as: PROTONIX   40 mg, Daily             Stop  These Medications      hydroCHLOROthiazide 12.5 MG tablet     pravastatin 40 MG tablet  Commonly known as: PRAVACHOL              Allergies   Allergen Reactions    Doxycycline Photosensitivity         Discharge Disposition:  Home or Self Care    Diet:  Hospital:  No active diet order        Discharge Activity: As tolerated        CODE STATUS:  Code Status and Medical Interventions: CPR (Attempt to Resuscitate); Full Support   Ordered at: 04/29/25 1714     Code Status (Patient has no pulse and is not breathing):    CPR (Attempt to Resuscitate)     Medical Interventions (Patient has pulse or is breathing):    Full Support     Level Of Support Discussed With:    Patient         Future Appointments   Date Time Provider Department Center   5/21/2025  2:00 PM Kimberly Bo APRN [facility] [place]       Additional Instructions for the Follow-ups that You Need to Schedule       Ambulatory Referral to Cardiac Rehab   As directed              Time spent on Discharge including face to face service:  39 minutes    Electronically signed by Lauri Wyatt MD, 05/01/25, 8:36 AM EDT.

## 2025-05-01 NOTE — NURSING NOTE
Pt became frustrated when roommate refused to turn off light and TV. Room mate had also required a lot of assistance throughout shift. Pt requested to change rooms. Pt & belongings moved to room 217-1. Pt appreciated room change.

## 2025-05-01 NOTE — PLAN OF CARE
Goal Outcome Evaluation:  Plan of Care Reviewed With: patient        Progress: improving  Outcome Evaluation: Patient ambulating around unit multiple times a shift, no chest pain noted, wife at bedside. No questions or concerns, ready to go home.

## 2025-05-01 NOTE — PLAN OF CARE
Goal Outcome Evaluation:              Outcome Evaluation: Pt now sleeping. Has had no complaints of CP or other physical discomforts. Up & about on unit & room per self. SR/SB.

## 2025-05-02 ENCOUNTER — TELEPHONE (OUTPATIENT)
Dept: CARDIOLOGY | Facility: CLINIC | Age: 65
End: 2025-05-02
Payer: COMMERCIAL

## 2025-05-02 ENCOUNTER — READMISSION MANAGEMENT (OUTPATIENT)
Dept: CALL CENTER | Facility: HOSPITAL | Age: 65
End: 2025-05-02
Payer: COMMERCIAL

## 2025-05-02 NOTE — TELEPHONE ENCOUNTER
[place] patient. Went over directions to keep site clean and dry, reapply clean band aid. No swimming or hot tub until site is healed. Patient was mowing grass on riding - patient encouraged to take it easy, no rigorous activity or exertion until seen for f/u. Patient verbalized understanding and appreciation.

## 2025-05-02 NOTE — OUTREACH NOTE
Prep Survey      Flowsheet Row Responses   [facility] facility patient discharged from? [facility]   Is LACE score < 7 ? Yes   Eligibility Readm Mgmt   Discharge diagnosis STEMI (ST elevation myocardial infarction   Does the patient have one of the following disease processes/diagnoses(primary or secondary)? Acute MI (STEMI,NSTEMI)   Does the patient have Home health ordered? No   Is there a DME ordered? No   Prep survey completed? Yes            WILNER RAMSEY - Registered Nurse

## 2025-05-02 NOTE — TELEPHONE ENCOUNTER
Caller: Christiano Barr    Relationship: Self    Best call back number: [phone]     What is the best time to reach you: ANY    Who are you requesting to speak with (clinical staff, provider,  specific staff member): CLINICAL    Do you know the name of the person who called:     What was the call regarding:  PATIENT HAD A LEFT HEART CATH ON TUESDAY AND WAS NOTICING BANDAGES. NOBODY SAID ANYTHING ABOUT WHEN THE PATIENT COULD REMOVE THOSE SO PATIENT IS CALLING IN TODAY TO ASK THAT QUESTION. PATIENT ALSO WANTING TO KNOW WHEN THEY CAN SHOWER. PLEASE CALL PATIENT TO ADVISE. THANK YOU!    Is it okay if the provider responds through [facility]: NO

## 2025-05-05 LAB
QT INTERVAL: 421 MS
QTC INTERVAL: 382 MS

## 2025-05-06 ENCOUNTER — READMISSION MANAGEMENT (OUTPATIENT)
Dept: CALL CENTER | Facility: HOSPITAL | Age: 65
End: 2025-05-06
Payer: COMMERCIAL

## 2025-05-06 NOTE — OUTREACH NOTE
AMI Week 1 Survey      Flowsheet Row Responses   [facility] patient discharged from? [facility]   Does the patient have one of the following disease processes/diagnoses(primary or secondary)? Acute MI (STEMI,NSTEMI)   Week 1 attempt successful? Yes   Call start time 1608   Call end time 1617   Discharge diagnosis STEMI (ST elevation myocardial infarction   Meds reviewed with patient/caregiver? Yes   Is the patient having any side effects they believe may be caused by any medication additions or changes? No   Does the patient have all prescriptions related to this admission filled (includes statins,anticoagulants,HTN meds,anti-arrhythmia meds) Yes   Is the patient taking all medications as directed (includes completed medication regime)? Yes   Does the patient have a primary care provider?  Yes   Does the patient have an appointment with their PCP,cardiologist,or clinic within 7 days of discharge? 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 Pt reports that his Right radial and R. groin sites are without issues.   Did the patient receive a copy of their discharge instructions? Yes   Nursing interventions Reviewed instructions with patient   What is the patient's perception of their health status since discharge? Improving   Nursing interventions Nurse provided patient education   Is the patient/caregiver able to teach back signs and symptoms of when to call for help immediately: Sudden chest discomfort, Sudden discomfort in arms, back, neck or jaw, Shortness of breath at any time, Dizziness or lightheadedness   Nursing interventions Nurse provided patient education   Is the pateint /caregiver able to teach back the importance of cardiac rehab? Yes   Is the patient/caregiver able to teach back lifestyle changes to help prevent MIs Regular exercise as approved by provider, Heart healthy diet   Is the patient/caregiver able to teach back  ways to prevent a second heart attack: Take medications, Follow up with MD   If the patient is a current smoker, are they able to teach back resources for cessation? Not a smoker   Is the patient/caregiver able to teach back the hierarchy of who to call/visit for symptoms/problems? PCP, Specialist, Home health nurse, Urgent Care, ED, 911 Yes   Week 1 call completed? Yes   Revoked No further contact(revokes)-requires comment   Call end time 0255            Nadia KNIGHT - Registered Nurse

## 2025-05-07 ENCOUNTER — TELEPHONE (OUTPATIENT)
Dept: CARDIOLOGY | Facility: CLINIC | Age: 65
End: 2025-05-07
Payer: COMMERCIAL

## 2025-05-07 DIAGNOSIS — Z95.5 S/P CORONARY ARTERY STENT PLACEMENT: Primary | ICD-10-CM

## 2025-05-12 ENCOUNTER — OFFICE VISIT (OUTPATIENT)
Dept: CARDIOLOGY | Facility: CLINIC | Age: 65
End: 2025-05-12
Payer: COMMERCIAL

## 2025-05-12 DIAGNOSIS — I25.10 CORONARY ARTERY DISEASE INVOLVING NATIVE CORONARY ARTERY OF NATIVE HEART WITHOUT ANGINA PECTORIS: Primary | ICD-10-CM

## 2025-05-12 DIAGNOSIS — I10 ESSENTIAL HYPERTENSION: ICD-10-CM

## 2025-05-12 DIAGNOSIS — E78.5 HYPERLIPIDEMIA LDL GOAL <70: ICD-10-CM

## 2025-05-12 DIAGNOSIS — Z95.5 S/P CORONARY ARTERY STENT PLACEMENT: ICD-10-CM

## 2025-05-12 RX ORDER — ROSUVASTATIN CALCIUM 5 MG/1
5 TABLET, COATED ORAL NIGHTLY
Qty: 90 TABLET | Refills: 3 | Status: SHIPPED | OUTPATIENT
Start: 2025-05-12

## 2025-05-12 RX ORDER — AMLODIPINE BESYLATE 5 MG/1
5 TABLET ORAL DAILY
Qty: 90 TABLET | Refills: 1 | Status: CANCELLED | OUTPATIENT
Start: 2025-05-12

## 2025-05-12 NOTE — PROGRESS NOTES
Chief Complaint  Follow-up and Shortness of Breath    Subjective        History of Present Illness  Christiano Barr presents to [facility] CARDIOLOGY   Mr. Barr is a 64-year-old male coming in today for cardiac follow-up after recent hospitalization for STEMI with angioplasty and ÓSCAR to the culprit lesion with 100% occlusion to right posterior lateral ventricular branch.  He also had 70% lesion in LAD and 80% lesion in the OM branch.  He was discharged home on dual antiplatelet therapy, and is tolerating this without any bleeding issues.  He has no complaints of chest pains, shortness of breath or palpitations.  He experienced rash taking atorvastatin, he recalls that he tried it a few years prior, and did develop some myalgias at that time.                 Past Medical History:   Diagnosis Date    Acid reflux     Anxiety     Arthritis     Bradycardia     Congenital spondylolysis, lumbosacral region     Hyperlipidemia     Hypertension     Low back pain     Peripheral neuropathy     Pre-diabetes     Risk factors for obstructive sleep apnea     Rotator cuff tear     Sleep apnea     Spinal stenosis of lumbar region with radiculopathy        Allergies   Allergen Reactions    Lipitor [Atorvastatin] Rash     Also myalgia    Doxycycline Photosensitivity        Past Surgical History:   Procedure Laterality Date    APPENDECTOMY      CARDIAC CATHETERIZATION N/A 4/29/2025    Procedure: Left Heart Cath;  Surgeon: Lauri Wyatt MD;  Location: [facility] CATH INVASIVE LOC[facility];  Service: Cardiovascular;  Laterality: N/A;    INGUINAL HERNIA REPAIR Right     LUMBAR FUSION N/A 1/6/2022    Procedure: Open lumbar decompression with posterior lumbar interbody fusion with cages and pedicle scew/susan fixation lumbar four/five, lumbar five/sacral one using the Nuvolaor robot;  Surgeon: Luis Brewer MD;  Location: [facility] OR;  Service: Robotics - Neuro;  Laterality: N/A;    TOTAL KNEE ARTHROPLASTY Left  "1/18/2018    Procedure: LT TOTAL KNEE ARTHROPLASTY;  Surgeon: Ralph Panchal MD;  Location: [facility];  Service:         Social History  He  reports that he has never smoked. He has never used smokeless tobacco. He reports current alcohol use of about 8.0 standard drinks of alcohol per week. He reports that he does not use drugs.    Family History  His family history is not on file.       Current Outpatient Medications on File Prior to Visit   Medication Sig    amLODIPine (NORVASC) 2.5 MG tablet Take 2 tablets by mouth Daily.    aspirin 81 MG chewable tablet Chew 1 tablet by mouth once daily.    losartan-hydrochlorothiazide (HYZAAR) 100-25 MG per tablet Take 1 tablet by mouth Daily.    meloxicam (MOBIC) 15 MG tablet Take 1 tablet by mouth Daily.    metoprolol succinate XL (TOPROL-XL) 100 MG 24 hr tablet Take 1 tablet by mouth Daily.    montelukast (SINGULAIR) 10 MG tablet Take 1 tablet by mouth Daily.    pantoprazole (PROTONIX) 40 MG EC tablet Take 1 tablet by mouth Daily.    tamsulosin (FLOMAX) 0.4 MG capsule 24 hr capsule Take 1 capsule by mouth Daily. (Patient taking differently: Take 1 capsule by mouth Every Night.)    ticagrelor (BRILINTA) 90 MG tablet tablet Take 1 tablet by mouth Every 12 (Twelve) Hours.     No current facility-administered medications on file prior to visit.         Review of Systems   Constitutional:  Negative for fatigue.   Respiratory:  Negative for cough, chest tightness and shortness of breath.    Cardiovascular:  Negative for chest pain, palpitations and leg swelling.   Gastrointestinal:  Negative for nausea and vomiting.   Neurological:  Negative for dizziness and syncope.        Objective   Vitals:    05/12/25 0900 05/12/25 0913   BP: 160/81 142/76   Pulse: 56    Weight: 94.8 kg (209 lb)    Height: 170.2 cm (67\")          Physical Exam  General : Alert, awake, no acute distress  Neck : Supple, no carotid bruit, no jugular venous distention  CVS : Regular rate and rhythm, no " "murmur, no rubs or gallops  Lungs: Clear to auscultation bilaterally, no crackles or rhonchi  Abdomen: Soft, nontender, bowel sounds active  Extremities: Warm, well-perfused, no pedal edema      Result Review     The following data was reviewed by HERO Leblanc  proBNP   Date Value Ref Range Status   04/29/2025 68.1 0.0 - 900.0 pg/mL Final     CMP          4/29/2025    15:06 4/30/2025    02:43 5/1/2025    06:28   CMP   Glucose 103  104  98    BUN 19  12  15    Creatinine 1.03  0.87  0.90    EGFR 81.1  96.4  95.4    Sodium 141  137  137    Potassium 3.6  3.4  3.8    Chloride 104  102  101    Calcium 9.1  9.0  9.3    Total Protein 7.3  6.8  7.0    Albumin 4.5  3.9  4.2    Globulin 2.8  2.9  2.8    Total Bilirubin 0.7  0.9  0.9    Alkaline Phosphatase 77  70  74    AST (SGOT) 27  62  35    ALT (SGPT) 27  30  26    Albumin/Globulin Ratio 1.6  1.3  1.5    BUN/Creatinine Ratio 18.4  13.8  16.7    Anion Gap 15.3  13.4  15.0      CBC w/diff          4/29/2025    15:06 4/30/2025    02:43 5/1/2025    06:28   CBC w/Diff   WBC 9.07  8.25  7.84    RBC 4.76  4.73  4.82    Hemoglobin 15.5  15.0  15.5    Hematocrit 43.5  42.4  43.8    MCV 91.4  89.6  90.9    MCH 32.6  31.7  32.2    MCHC 35.6  35.4  35.4    RDW 12.7  12.8  13.0    Platelets 231  205  212    Neutrophil Rel % 72.9  73.5     Immature Granulocyte Rel % 0.4  0.4     Lymphocyte Rel % 15.7  13.9     Monocyte Rel % 8.5  9.7     Eosinophil Rel % 2.1  2.1     Basophil Rel % 0.4  0.4        Lab Results   Component Value Date    TSH 2.430 04/30/2025      No results found for: \"FREET4\"   No results found for: \"DDIMERQUANT\"  Magnesium   Date Value Ref Range Status   05/01/2025 2.0 1.6 - 2.4 mg/dL Final      No results found for: \"DIGOXIN\"   Lab Results   Component Value Date    TROPONINT 363 (C) 05/01/2025           Lipid Panel          4/30/2025    02:43   Lipid Panel   Total Cholesterol 152    Triglycerides 118    HDL Cholesterol 39    VLDL Cholesterol 21    LDL " Cholesterol  92    LDL/HDL Ratio 2.29          Results for orders placed during the hospital encounter of 04/29/25    Adult Transthoracic Echo Complete w/ Color, Spectral and Contrast if necessary per protocol    Interpretation Summary    Overall LV ejection fraction is 55 to 60%.  There is mild basal inferior wall hypokinesis.    Left ventricular diastolic function is normal.    There is mild tricuspid regurgitation.  Estimated right ventricular systolic pressure from tricuspid regurgitation is normal (<35 mmHg).         Assessment and Plan   Diagnoses and all orders for this visit:    1. Coronary artery disease involving native coronary artery of native heart without angina pectoris (Primary)  Assessment & Plan:  He has been stable with angina since undergoing angioplasty to culprit lesion for a recent STEMI.  We will continue with DAPT therapy with Brilinta and aspirin, beta-blocker and statin.    Orders:  -     CBC (No Diff); Future    2. S/P coronary artery stent placement  Assessment & Plan:  Rehab referral has been placed.      3. Essential hypertension  Assessment & Plan:  Blood pressure initially elevated but improved on recheck, recommend home BP log, and may adjust medications depending on overall BP control.  In the meantime continue current dose of losartan/HCTZ, amlodipine, and metoprolol.    Orders:  -     Comprehensive Metabolic Panel; Future    4. Hyperlipidemia LDL goal <70  Assessment & Plan:   LDL not at goal, pravastatin switched to atorvastatin during recent hospitalization, however he developed a rash, and also reports he had previous history of myalgias with atorvastatin.  Will see if he can tolerate rosuvastatin, and recheck fasting lipid profile before next routine follow-up visit.    Orders:  -     Lipid Panel; Future  -     Comprehensive Metabolic Panel; Future    Other orders  -     rosuvastatin (CRESTOR) 5 MG tablet; Take 1 tablet by mouth Every Night.  Dispense: 90 tablet; Refill:  3            Follow Up   Return in about 3 months (around 8/12/2025) for with Dr. Wyatt.    Patient was given instructions and counseling regarding his condition or for health maintenance advice. Please see specific information pulled into the AVS if appropriate.     Signed,  Kimberly Bo, APRN  05/12/2025     Dictated Utilizing Dragon Dictation: Please note that portions of this note were completed with a voice recognition program.  Part of this note may be an electronic transcription/translation of spoken language to printed text using the Dragon Dictation System.

## 2025-05-16 ENCOUNTER — TELEPHONE (OUTPATIENT)
Dept: CARDIOLOGY | Facility: CLINIC | Age: 65
End: 2025-05-16
Payer: COMMERCIAL

## 2025-05-16 NOTE — TELEPHONE ENCOUNTER
Patient called stating pharmacy will not fill Crestor as he had a reaction to Lipitor with a rash.     Attempted to call pharmacy.

## 2025-05-19 NOTE — TELEPHONE ENCOUNTER
SW patient pharmacy. Went over importance patient being on statin in lite of having recent cardiac stent placement. If patient has a reaction to the Crestor, we would be able to document and try injectables, but insurance will not cover before trying a different statin.     Pharmacist Javi, did advise patient to not take another statin due to risk of anaphylaxis.

## 2025-05-19 NOTE — TELEPHONE ENCOUNTER
Attempted to call patient to get more information on the patient experience with Lipitor. Left VM with call back number.

## 2025-05-20 VITALS
SYSTOLIC BLOOD PRESSURE: 142 MMHG | DIASTOLIC BLOOD PRESSURE: 76 MMHG | HEIGHT: 67 IN | HEART RATE: 56 BPM | BODY MASS INDEX: 32.8 KG/M2 | WEIGHT: 209 LBS

## 2025-05-20 PROBLEM — I25.10 CORONARY ARTERY DISEASE INVOLVING NATIVE CORONARY ARTERY OF NATIVE HEART WITHOUT ANGINA PECTORIS: Status: ACTIVE | Noted: 2025-05-20

## 2025-05-20 PROBLEM — E78.5 HYPERLIPIDEMIA LDL GOAL <70: Status: ACTIVE | Noted: 2025-05-20

## 2025-05-20 PROBLEM — Z95.5 S/P CORONARY ARTERY STENT PLACEMENT: Status: ACTIVE | Noted: 2025-05-20

## 2025-05-20 NOTE — ASSESSMENT & PLAN NOTE
Blood pressure initially elevated but improved on recheck, recommend home BP log, and may adjust medications depending on overall BP control.  In the meantime continue current dose of losartan/HCTZ, amlodipine, and metoprolol.

## 2025-05-20 NOTE — ASSESSMENT & PLAN NOTE
He has been stable with angina since undergoing angioplasty to culprit lesion for a recent STEMI.  We will continue with DAPT therapy with Brilinta and aspirin, beta-blocker and statin.

## 2025-05-20 NOTE — TELEPHONE ENCOUNTER
Open he had some rash and myalgias with atorvastatin, he was able to tolerate pravastatin, think it is reasonable for him to try rosuvastatin.  However if he is willing we can try to send in a prescription for Repatha 140 mg every 2 weeks to avoid the statin classification.  May check with specialty pharmacy to see if they can help get this approved patient is agreeable.

## 2025-05-20 NOTE — ASSESSMENT & PLAN NOTE
LDL not at goal, pravastatin switched to atorvastatin during recent hospitalization, however he developed a rash, and also reports he had previous history of myalgias with atorvastatin.  Will see if he can tolerate rosuvastatin, and recheck fasting lipid profile before next routine follow-up visit.

## 2025-05-21 ENCOUNTER — SPECIALTY PHARMACY (OUTPATIENT)
Dept: CARDIOLOGY | Facility: CLINIC | Age: 65
End: 2025-05-21
Payer: COMMERCIAL

## 2025-05-21 ENCOUNTER — SPECIALTY PHARMACY (OUTPATIENT)
Facility: HOSPITAL | Age: 65
End: 2025-05-21
Payer: COMMERCIAL

## 2025-05-21 RX ORDER — EVOLOCUMAB 140 MG/ML
140 INJECTION, SOLUTION SUBCUTANEOUS
Qty: 6 ML | Refills: 3 | Status: SHIPPED | OUTPATIENT
Start: 2025-05-21

## 2025-05-21 NOTE — PROGRESS NOTES
New Referral Cardiology    Received a referral from HERO Morales      Start Date of Treatment: 5/22/2025  Indication: Hyperlipidemia  Therapeutic Goals: LDL < 70  Can the patient self-administer medications?: Yes      Drug-Drug Interactions: The current medication list was reviewed and there are no relevant drug-drug interactions with the specialty medication.  Medication Allergies: The patient has no relevant allergies as it relates to their oral specialty medication  Review of Labs/Dose Adjustments: The patient's most recent labs were reviewed and all are WNL to start treatment at this dose.     A prescription was released to  [facility]  for   Drug: Repatha  Strength: 140 mg  Directions: Inject 140mg SUBQ every 14 days  Quantity: 6  Refills: 3    Nany Rod Quezada Pharm.D.    5/21/2025  11:12 EDT

## 2025-06-02 RX ORDER — TICAGRELOR 90 MG/1
90 TABLET ORAL EVERY 12 HOURS SCHEDULED
Qty: 60 TABLET | Refills: 1 | OUTPATIENT
Start: 2025-06-02

## 2025-06-18 RX ORDER — TICAGRELOR 90 MG/1
90 TABLET, FILM COATED ORAL EVERY 12 HOURS SCHEDULED
Qty: 60 TABLET | Refills: 3 | Status: SHIPPED | OUTPATIENT
Start: 2025-06-18

## 2025-06-25 ENCOUNTER — TELEPHONE (OUTPATIENT)
Dept: CARDIOLOGY | Facility: CLINIC | Age: 65
End: 2025-06-25
Payer: COMMERCIAL

## 2025-06-25 RX ORDER — TICAGRELOR 90 MG/1
90 TABLET, FILM COATED ORAL EVERY 12 HOURS SCHEDULED
Qty: 180 EACH | Refills: 1 | Status: SHIPPED | OUTPATIENT
Start: 2025-06-25

## 2025-06-25 NOTE — TELEPHONE ENCOUNTER
Caller: Christiano Barr    Relationship: Self    Best call back number: [phone]     Which medication are you concerned about: MEGAN     Who prescribed you this medication: DR. SANCHEZ     When did you start taking this medication:     What are your concerns:  PATIENT WILL BE OUT OF TOWN FROM 7.21.25-8.3.25. DURING THE TIME PATIENT WILL BE OUT OF TOWN, HE WILL BE OUT OF MEDICATION. PATIENT IS ASKING IF DR. SANCHEZ COULD SEND A 60 DAY SCRIPT TO [facility] INSTEAD OF A 30 DAY SUPPLY SO HE DOESN'T RUN OUT OF MEDICATION.     How long have you had these concerns: RECENTLY

## 2025-08-13 ENCOUNTER — OFFICE VISIT (OUTPATIENT)
Dept: CARDIOLOGY | Facility: CLINIC | Age: 65
End: 2025-08-13
Payer: COMMERCIAL

## 2025-08-13 VITALS
SYSTOLIC BLOOD PRESSURE: 132 MMHG | DIASTOLIC BLOOD PRESSURE: 78 MMHG | HEART RATE: 64 BPM | HEIGHT: 67 IN | BODY MASS INDEX: 33.12 KG/M2 | WEIGHT: 211 LBS

## 2025-08-13 DIAGNOSIS — E78.5 HYPERLIPIDEMIA LDL GOAL <70: ICD-10-CM

## 2025-08-13 DIAGNOSIS — I25.10 CORONARY ARTERY DISEASE INVOLVING NATIVE CORONARY ARTERY OF NATIVE HEART WITHOUT ANGINA PECTORIS: Primary | ICD-10-CM

## 2025-08-13 DIAGNOSIS — I10 ESSENTIAL HYPERTENSION: ICD-10-CM

## 2025-08-13 PROCEDURE — 99214 OFFICE O/P EST MOD 30 MIN: CPT | Performed by: FAMILY MEDICINE

## 2025-08-13 PROCEDURE — 3075F SYST BP GE 130 - 139MM HG: CPT | Performed by: FAMILY MEDICINE

## 2025-08-13 PROCEDURE — 3078F DIAST BP <80 MM HG: CPT | Performed by: FAMILY MEDICINE

## 2025-08-13 RX ORDER — PREDNISONE 5 MG/1
5 TABLET ORAL DAILY
COMMUNITY

## 2025-08-13 RX ORDER — AZITHROMYCIN 250 MG/1
250 TABLET, FILM COATED ORAL DAILY
COMMUNITY

## 2025-08-13 RX ORDER — GUAIFENESIN AND DEXTROMETHORPHAN HYDROBROMIDE 10; 100 MG/5ML; MG/5ML
5 SYRUP ORAL EVERY 12 HOURS
COMMUNITY

## 2025-08-15 ENCOUNTER — LAB (OUTPATIENT)
Dept: LAB | Facility: HOSPITAL | Age: 65
End: 2025-08-15
Payer: COMMERCIAL

## 2025-08-15 DIAGNOSIS — I25.10 CORONARY ARTERY DISEASE INVOLVING NATIVE CORONARY ARTERY OF NATIVE HEART WITHOUT ANGINA PECTORIS: ICD-10-CM

## 2025-08-15 DIAGNOSIS — E78.5 HYPERLIPIDEMIA LDL GOAL <70: ICD-10-CM

## 2025-08-15 LAB
ALBUMIN SERPL-MCNC: 3.9 G/DL (ref 3.5–5.2)
ALBUMIN/GLOB SERPL: 1.3 G/DL
ALP SERPL-CCNC: 71 U/L (ref 39–117)
ALT SERPL W P-5'-P-CCNC: 20 U/L (ref 1–41)
ANION GAP SERPL CALCULATED.3IONS-SCNC: 11.6 MMOL/L (ref 5–15)
AST SERPL-CCNC: 18 U/L (ref 1–40)
BILIRUB SERPL-MCNC: 0.6 MG/DL (ref 0–1.2)
BUN SERPL-MCNC: 25 MG/DL (ref 8–23)
BUN/CREAT SERPL: 17.2 (ref 7–25)
CALCIUM SPEC-SCNC: 8.7 MG/DL (ref 8.6–10.5)
CHLORIDE SERPL-SCNC: 106 MMOL/L (ref 98–107)
CHOLEST SERPL-MCNC: 99 MG/DL (ref 0–200)
CO2 SERPL-SCNC: 21.4 MMOL/L (ref 22–29)
CREAT SERPL-MCNC: 1.45 MG/DL (ref 0.76–1.27)
DEPRECATED RDW RBC AUTO: 44.2 FL (ref 37–54)
EGFRCR SERPLBLD CKD-EPI 2021: 53.8 ML/MIN/1.73
ERYTHROCYTE [DISTWIDTH] IN BLOOD BY AUTOMATED COUNT: 13.3 % (ref 12.3–15.4)
GLOBULIN UR ELPH-MCNC: 2.9 GM/DL
GLUCOSE SERPL-MCNC: 85 MG/DL (ref 65–99)
HCT VFR BLD AUTO: 39 % (ref 37.5–51)
HDLC SERPL-MCNC: 51 MG/DL (ref 40–60)
HGB BLD-MCNC: 13.8 G/DL (ref 13–17.7)
LDLC SERPL CALC-MCNC: 34 MG/DL (ref 0–100)
LDLC/HDLC SERPL: 0.7 {RATIO}
MCH RBC QN AUTO: 32.8 PG (ref 26.6–33)
MCHC RBC AUTO-ENTMCNC: 35.4 G/DL (ref 31.5–35.7)
MCV RBC AUTO: 92.6 FL (ref 79–97)
PLATELET # BLD AUTO: 195 10*3/MM3 (ref 140–450)
PMV BLD AUTO: 9.1 FL (ref 6–12)
POTASSIUM SERPL-SCNC: 4 MMOL/L (ref 3.5–5.2)
PROT SERPL-MCNC: 6.8 G/DL (ref 6–8.5)
RBC # BLD AUTO: 4.21 10*6/MM3 (ref 4.14–5.8)
SODIUM SERPL-SCNC: 139 MMOL/L (ref 136–145)
TRIGL SERPL-MCNC: 62 MG/DL (ref 0–150)
VLDLC SERPL-MCNC: 14 MG/DL (ref 5–40)
WBC NRBC COR # BLD AUTO: 9.47 10*3/MM3 (ref 3.4–10.8)

## 2025-08-15 PROCEDURE — 36415 COLL VENOUS BLD VENIPUNCTURE: CPT

## 2025-08-15 PROCEDURE — 80061 LIPID PANEL: CPT

## 2025-08-15 PROCEDURE — 85027 COMPLETE CBC AUTOMATED: CPT

## 2025-08-15 PROCEDURE — 80053 COMPREHEN METABOLIC PANEL: CPT

## 2025-08-18 ENCOUNTER — RESULTS FOLLOW-UP (OUTPATIENT)
Dept: CARDIOLOGY | Facility: CLINIC | Age: 65
End: 2025-08-18
Payer: COMMERCIAL

## 2025-08-18 DIAGNOSIS — I10 ESSENTIAL HYPERTENSION: Primary | ICD-10-CM

## (undated) DEVICE — SPHR MARKR STEALTH STATION

## (undated) DEVICE — DRESSING,GAUZE,XEROFORM,CURAD,1"X8",ST: Brand: CURAD

## (undated) DEVICE — SLV DISTRACTION STAR VELCRO XL DISP

## (undated) DEVICE — HEARTRAIL III GUIDING CATHETER: Brand: HEARTRAIL

## (undated) DEVICE — CATH F6 ST PIG 155 110CM 6SH: Brand: SUPER TORQUE

## (undated) DEVICE — APPL CHLORAPREP HI/LITE 26ML ORNG

## (undated) DEVICE — SYR LUERLOK 20CC

## (undated) DEVICE — DISPOSABLE BIPOLAR FORCEPS 7 3/4" (19.7CM) SCOVILLE BAYONET, 1.5MM TIP AND 12 FT. (3.6M) CABLE: Brand: KIRWAN

## (undated) DEVICE — SYR CONTRL LUERLOK 10CC

## (undated) DEVICE — IMPLANTABLE DEVICE
Type: IMPLANTABLE DEVICE | Site: BACK | Status: NON-FUNCTIONAL
Removed: 2022-01-06

## (undated) DEVICE — PATIENT RETURN ELECTRODE, SINGLE-USE, CONTACT QUALITY MONITORING, ADULT, WITH 9FT CORD, FOR PATIENTS WEIGING OVER 33LBS. (15KG): Brand: MEGADYNE

## (undated) DEVICE — GLOVE,SURG,SENSICARE SLT,LF,PF,6: Brand: MEDLINE

## (undated) DEVICE — SLV SCD KN/LEN ADJ EXPRSS BLENDED MD 1P/U

## (undated) DEVICE — HI-TORQUE BALANCE MIDDLEWEIGHT UNIVERSAL GUIDE WIRE .014 STRAIGHT TIP 3.0 CM X 190 CM: Brand: HI-TORQUE BALANCE MIDDLEWEIGHT UNIVERSAL

## (undated) DEVICE — BURSECTOR 5.5 MM X 125 MM.  DO NOT RESTERILIZE, DO NOT USE IF PACKAGE IS DAMAGED, KEEP DRY, KEEP AWAY FROM DIRECT SUNLIGHT: Brand: CROSSBLADE

## (undated) DEVICE — 3M™ STERI-DRAPE™ INSTRUMENT POUCH 1018: Brand: STERI-DRAPE™

## (undated) DEVICE — NDL SPINE 20G 3 1/2 YEL STRL 1P/U

## (undated) DEVICE — ANTIBACTERIAL UNDYED BRAIDED (POLYGLACTIN 910), SYNTHETIC ABSORBABLE SUTURE: Brand: COATED VICRYL

## (undated) DEVICE — SUT VIC 4/0 P3 18IN J494G

## (undated) DEVICE — MAT FLR ABS W/BLU/LINER 56X72IN WHT

## (undated) DEVICE — SUT PROLN 0 CT1 30IN 8424H

## (undated) DEVICE — SUT ETHLN 3-0 FS118IN 663H

## (undated) DEVICE — TOOL MR8-14BA50C MR8 14CM BALL CARB 5MM: Brand: MIDAS REX MR8

## (undated) DEVICE — GLV SURG SENSICARE PI ORTHO SZ8 LF STRL

## (undated) DEVICE — PK NEURO SPINE 40

## (undated) DEVICE — ENCORE® LATEX ORTHO SIZE 8.5, STERILE LATEX POWDER-FREE SURGICAL GLOVE: Brand: ENCORE

## (undated) DEVICE — NC TREK NEO™ CORONARY DILATATION CATHETER 2.25 MM X 12 MM / RAPID-EXCHANGE: Brand: NC TREK NEO™

## (undated) DEVICE — TOOL MR8-31TD3030 MR8 TWST DRIL 3MMX30MM: Brand: MIDAS REX

## (undated) DEVICE — TR BAND RADIAL ARTERY COMPRESSION DEVICE: Brand: TR BAND

## (undated) DEVICE — TOOL MR8-14MH30 MR8 14CM MATCH 3MM: Brand: MIDAS REX MR8

## (undated) DEVICE — KT SPINE MAZORX DISP

## (undated) DEVICE — GW INQWIRE FC PTFE STD J/1.5 .035 260

## (undated) DEVICE — DUAL CUT SAGITTAL BLADE

## (undated) DEVICE — GOWN,NON-REINFORCED,SIRUS,SET IN SLV,XXL: Brand: MEDLINE

## (undated) DEVICE — SHOULDER ARTHROSCOPY-LF: Brand: MEDLINE INDUSTRIES, INC.

## (undated) DEVICE — INTENDED FOR TISSUE SEPARATION, AND OTHER PROCEDURES THAT REQUIRE A SHARP SURGICAL BLADE TO PUNCTURE OR CUT.: Brand: BARD-PARKER ® CARBON RIB-BACK BLADES

## (undated) DEVICE — TBG INFLOW/OUTFLOW DUALWAVE 1P/U

## (undated) DEVICE — SOL NACL 0.9PCT 1000ML

## (undated) DEVICE — BNDG ADHS CURAD FLX/FABRC 2X4IN STRL LF

## (undated) DEVICE — GAUZE,SPONGE,FLUFF,6"X6.75",STRL,10/TRAY: Brand: MEDLINE

## (undated) DEVICE — 90-S CRUISE, SUCTION PROBE, NON-BENDABLE, MAX CUT LEVEL 1: Brand: SERFAS ENERGY

## (undated) DEVICE — BANDAGE,GAUZE,BULKEE II,4.5"X4.1YD,STRL: Brand: MEDLINE

## (undated) DEVICE — NC TREK NEO™ CORONARY DILATATION CATHETER 2.50 X 12 MM / RAPID-EXCHANGE: Brand: NC TREK NEO™

## (undated) DEVICE — APPL DURAPREP IODOPHOR APL 26ML

## (undated) DEVICE — GLV SURG TRIUMPH CLASSIC PF LTX 8.5 STRL

## (undated) DEVICE — DRAPE,REIN 53X77,STERILE: Brand: MEDLINE

## (undated) DEVICE — TOWEL,OR,DSP,ST,BLUE,STD,4/PK,20PK/CS: Brand: MEDLINE

## (undated) DEVICE — SHOULDER P.A.D. III: Brand: DEROYAL

## (undated) DEVICE — STPLR SKIN VISISTAT WD 35CT

## (undated) DEVICE — CATH F5 INF JL 4 100CM: Brand: INFINITI

## (undated) DEVICE — SCREW 54840046540 4.75 ATS MAS 6.5X40
Type: IMPLANTABLE DEVICE | Site: BACK | Status: NON-FUNCTIONAL
Brand: CD HORIZON® SPINAL SYSTEM
Removed: 2022-01-06

## (undated) DEVICE — GLV SURG PREMIERPRO ORTHO LTX PF SZ8 BRN

## (undated) DEVICE — STERILE POLYISOPRENE POWDER-FREE SURGICAL GLOVES WITH EMOLLIENT COATING: Brand: PROTEXIS

## (undated) DEVICE — NEEDLE, QUINCKE 22GX3.5": Brand: MEDLINE INDUSTRIES, INC.

## (undated) DEVICE — PINNACLE INTRODUCER SHEATH: Brand: PINNACLE

## (undated) DEVICE — BLD CUT FORMLA AGGR PLS 4.0MM

## (undated) DEVICE — DRP MICROSCOPE 4 BINOCULAR CV 54X150IN

## (undated) DEVICE — MARKR SKIN W/RULR AND LBL

## (undated) DEVICE — TOTAL TRAY, 16FR 10ML SIL FOLEY, URN: Brand: MEDLINE

## (undated) DEVICE — DRN JP FLT NO TROC SIL FUL/PERF 7MM

## (undated) DEVICE — DRSNG SLVR/ANTIBAC PRIMASEAL POST/OP ADHS 3.5X10IN

## (undated) DEVICE — 3M™ IOBAN™ 2 ANTIMICROBIAL INCISE DRAPE 6650EZ: Brand: IOBAN™ 2

## (undated) DEVICE — KT DRN EVAC WND PVC PCH WTROC RND 10F400

## (undated) DEVICE — SOL IRR NACL 0.9PCT 3000ML

## (undated) DEVICE — JACKSON-PRATT 100CC BULB RESERVOIR: Brand: CARDINAL HEALTH

## (undated) DEVICE — PK KN TOTL 40

## (undated) DEVICE — 3M™ STERI-STRIP™ ANTIMICROBIAL SKIN CLOSURES 1/2 INCH X 4 INCHES 50/CARTON 4 CARTONS/CASE A1847: Brand: 3M™ STERI-STRIP™

## (undated) DEVICE — DRP C/ARM 41X74IN

## (undated) DEVICE — DRSNG WND GZ CURAD OIL EMULSION 3X8IN LF STRL 1PK

## (undated) DEVICE — CANN TRPL DAM 7X7MM

## (undated) DEVICE — SET SCREW 5440030 4.75 TI NS BRK OFF
Type: IMPLANTABLE DEVICE | Site: BACK | Status: NON-FUNCTIONAL
Brand: CD HORIZON® SPINAL SYSTEM
Removed: 2022-01-06

## (undated) DEVICE — GLV SURG BIOGEL LTX PF 6 1/2

## (undated) DEVICE — PENCL ES MEGADINE EZ/CLEAN BUTN W/HOLSTR 10FT

## (undated) DEVICE — ELECTRD BLD EZ CLN STD 4IN

## (undated) DEVICE — 2108 SERIES SAGITTAL BLADE, NO OFFSET  (12.4 X 1.19 X 82.1MM)

## (undated) DEVICE — STERILE POLYISOPRENE POWDER-FREE SURGICAL GLOVES: Brand: PROTEXIS

## (undated) DEVICE — GLIDESHEATH SLENDER STAINLESS STEEL KIT: Brand: GLIDESHEATH SLENDER

## (undated) DEVICE — 6F .070 JR4 ECO PK: Brand: VISTA BRITE TIP

## (undated) DEVICE — ANGIO-SEAL VIP VASCULAR CLOSURE DEVICE: Brand: ANGIO-SEAL

## (undated) DEVICE — PK ATS CUST W CARDIOTOMY RESEVOIR

## (undated) DEVICE — 6F .070 3 DRC 100CM: Brand: VISTA BRITE TIP